# Patient Record
Sex: MALE | Race: OTHER | URBAN - METROPOLITAN AREA
[De-identification: names, ages, dates, MRNs, and addresses within clinical notes are randomized per-mention and may not be internally consistent; named-entity substitution may affect disease eponyms.]

---

## 2019-08-10 ENCOUNTER — INPATIENT (INPATIENT)
Facility: HOSPITAL | Age: 59
LOS: 12 days | Discharge: LEFT BEFORE DISCHARGE | End: 2019-08-23
Attending: INTERNAL MEDICINE | Admitting: INTERNAL MEDICINE
Payer: MEDICAID

## 2019-08-10 VITALS — HEART RATE: 100 BPM | OXYGEN SATURATION: 99 %

## 2019-08-10 LAB
ALBUMIN SERPL ELPH-MCNC: 4.2 G/DL — SIGNIFICANT CHANGE UP (ref 3.5–5.2)
ALP SERPL-CCNC: 91 U/L — SIGNIFICANT CHANGE UP (ref 30–115)
ALT FLD-CCNC: 33 U/L — SIGNIFICANT CHANGE UP (ref 0–41)
ANION GAP SERPL CALC-SCNC: 18 MMOL/L — HIGH (ref 7–14)
AST SERPL-CCNC: 44 U/L — HIGH (ref 0–41)
BILIRUB SERPL-MCNC: 0.8 MG/DL — SIGNIFICANT CHANGE UP (ref 0.2–1.2)
BUN SERPL-MCNC: 21 MG/DL — HIGH (ref 10–20)
CALCIUM SERPL-MCNC: 9.1 MG/DL — SIGNIFICANT CHANGE UP (ref 8.5–10.1)
CHLORIDE SERPL-SCNC: 105 MMOL/L — SIGNIFICANT CHANGE UP (ref 98–110)
CO2 SERPL-SCNC: 21 MMOL/L — SIGNIFICANT CHANGE UP (ref 17–32)
CREAT SERPL-MCNC: 1.2 MG/DL — SIGNIFICANT CHANGE UP (ref 0.7–1.5)
GLUCOSE SERPL-MCNC: 229 MG/DL — HIGH (ref 70–99)
HCT VFR BLD CALC: 47.8 % — SIGNIFICANT CHANGE UP (ref 42–52)
HGB BLD-MCNC: 15 G/DL — SIGNIFICANT CHANGE UP (ref 14–18)
MCHC RBC-ENTMCNC: 28.8 PG — SIGNIFICANT CHANGE UP (ref 27–31)
MCHC RBC-ENTMCNC: 31.4 G/DL — LOW (ref 32–37)
MCV RBC AUTO: 91.9 FL — SIGNIFICANT CHANGE UP (ref 80–94)
NRBC # BLD: 0 /100 WBCS — SIGNIFICANT CHANGE UP (ref 0–0)
NT-PROBNP SERPL-SCNC: 1050 PG/ML — HIGH (ref 0–300)
PLATELET # BLD AUTO: 213 K/UL — SIGNIFICANT CHANGE UP (ref 130–400)
POTASSIUM SERPL-MCNC: 4.7 MMOL/L — SIGNIFICANT CHANGE UP (ref 3.5–5)
POTASSIUM SERPL-SCNC: 4.7 MMOL/L — SIGNIFICANT CHANGE UP (ref 3.5–5)
PROT SERPL-MCNC: 7.5 G/DL — SIGNIFICANT CHANGE UP (ref 6–8)
RBC # BLD: 5.2 M/UL — SIGNIFICANT CHANGE UP (ref 4.7–6.1)
RBC # FLD: 13.2 % — SIGNIFICANT CHANGE UP (ref 11.5–14.5)
SODIUM SERPL-SCNC: 144 MMOL/L — SIGNIFICANT CHANGE UP (ref 135–146)
TROPONIN T SERPL-MCNC: <0.01 NG/ML — SIGNIFICANT CHANGE UP
WBC # BLD: 10.7 K/UL — SIGNIFICANT CHANGE UP (ref 4.8–10.8)
WBC # FLD AUTO: 10.7 K/UL — SIGNIFICANT CHANGE UP (ref 4.8–10.8)

## 2019-08-10 PROCEDURE — 93010 ELECTROCARDIOGRAM REPORT: CPT

## 2019-08-10 PROCEDURE — 99291 CRITICAL CARE FIRST HOUR: CPT

## 2019-08-10 PROCEDURE — 70450 CT HEAD/BRAIN W/O DYE: CPT | Mod: 26

## 2019-08-10 PROCEDURE — 71045 X-RAY EXAM CHEST 1 VIEW: CPT | Mod: 26

## 2019-08-10 RX ORDER — FENTANYL CITRATE 50 UG/ML
0.5 INJECTION INTRAVENOUS
Qty: 2500 | Refills: 0 | Status: DISCONTINUED | OUTPATIENT
Start: 2019-08-10 | End: 2019-08-11

## 2019-08-10 RX ORDER — IPRATROPIUM/ALBUTEROL SULFATE 18-103MCG
3 AEROSOL WITH ADAPTER (GRAM) INHALATION ONCE
Refills: 0 | Status: DISCONTINUED | OUTPATIENT
Start: 2019-08-10 | End: 2019-08-10

## 2019-08-10 RX ORDER — CEFTRIAXONE 500 MG/1
1000 INJECTION, POWDER, FOR SOLUTION INTRAMUSCULAR; INTRAVENOUS ONCE
Refills: 0 | Status: COMPLETED | OUTPATIENT
Start: 2019-08-10 | End: 2019-08-10

## 2019-08-10 RX ORDER — AZITHROMYCIN 500 MG/1
500 TABLET, FILM COATED ORAL ONCE
Refills: 0 | Status: COMPLETED | OUTPATIENT
Start: 2019-08-10 | End: 2019-08-10

## 2019-08-10 RX ORDER — MIDAZOLAM HYDROCHLORIDE 1 MG/ML
0.02 INJECTION, SOLUTION INTRAMUSCULAR; INTRAVENOUS
Qty: 100 | Refills: 0 | Status: DISCONTINUED | OUTPATIENT
Start: 2019-08-10 | End: 2019-08-11

## 2019-08-10 RX ADMIN — Medication 125 MILLIGRAM(S): at 21:12

## 2019-08-10 RX ADMIN — CEFTRIAXONE 100 MILLIGRAM(S): 500 INJECTION, POWDER, FOR SOLUTION INTRAMUSCULAR; INTRAVENOUS at 21:57

## 2019-08-10 RX ADMIN — AZITHROMYCIN 255 MILLIGRAM(S): 500 TABLET, FILM COATED ORAL at 21:57

## 2019-08-10 RX ADMIN — FENTANYL CITRATE 5 MICROGRAM(S)/KG/HR: 50 INJECTION INTRAVENOUS at 20:25

## 2019-08-10 RX ADMIN — MIDAZOLAM HYDROCHLORIDE 2 MG/KG/HR: 1 INJECTION, SOLUTION INTRAMUSCULAR; INTRAVENOUS at 20:25

## 2019-08-10 NOTE — ED PROVIDER NOTE - OBJECTIVE STATEMENT
56y M w/ PMH of COPD and CAD s/p bypass presents after losing consciousness. Per EMS, pt was complaining of SOB before he went unconscious. Was intubated on field for airway protection.

## 2019-08-10 NOTE — ED PROVIDER NOTE - PHYSICAL EXAMINATION
CONSTITUTIONAL: Intubated. Unconscious  SKIN: warm, dry  HEAD: Normocephalic; atraumatic.  EYES: PERRL, EOMI, no conjunctival erythema  ENT: No nasal discharge; airway clear.  NECK: Supple; non tender.  CARD: S1, S2 normal; no murmurs, gallops, or rubs. Regular rate and rhythm. 2+ radial pulses B/L.   RESP: Coarse breath sounds B/l. No wheezes, rales or rhonchi.  ABD: midly distended. ntnd  EXT: Normal ROM.  No clubbing, cyanosis or edema.   LYMPH: No acute cervical adenopathy.  NEURO: Intubated. Unconscious  PSYCH: Cooperative, appropriate.

## 2019-08-10 NOTE — ED PROVIDER NOTE - PROGRESS NOTE DETAILS
ICU paged. Awaiting call back. Discussed with Dr. Felix, ICU fellow. Requests CT Head and CT Angio. Approved for ICU. Pt's daughter (Angelica). [phone]

## 2019-08-10 NOTE — ED PROVIDER NOTE - CRITICAL CARE PROVIDED
direct patient care (not related to procedure)/consultation with other physicians/consult w/ pt's family directly relating to pts condition/interpretation of diagnostic studies/documentation/additional history taking

## 2019-08-10 NOTE — ED PROVIDER NOTE - CLINICAL SUMMARY MEDICAL DECISION MAKING FREE TEXT BOX
57yo M history of COPD otherwise unknown past medical history BIBEMS with respiratory arrest- per EMS, pt was at restaurant, then c/o chest pain, shortness of breath, felt weak, went down. Upon EMS arrival, pt in respiratory arrest, intubated with etomidate and valium. Hx otherwise limited 2/2 severe illness and intubation. labs imaging reviewed.  icu, approved, will admit

## 2019-08-10 NOTE — ED PROVIDER NOTE - ATTENDING CONTRIBUTION TO CARE
55yo M history of COPD otherwise unknown past medical history BIBEMS with respiratory arrest- per EMS, pt was at restaurant, then c/o chest pain, shortness of breath, felt weak, went down. Upon EMS arrival, pt in respiratory arrest, intubated with etomidate and valium. Hx otherwise limited 2/2 severe illness and intubation  Constitutional: sedated  Eyes: PERRLA. Extraocular movements intact, no entrapment. Conjunctiva normal.   ENT: No nasal discharge. Moist mucus membranes. ETT in place, confirmed on VL  Neck: Supple  CV: RRR no murmurs, rubs, or gallops. +S1S2.   Pulm: Clear to auscultation bilaterally.    Abd: soft NT ND +BS.   Ext: Warm and well perfused x4, moving all extremities, no edema.   Psy: Cooperative, appropriate.   Skin: Warm, dry, no rash  resp arrest- labs imaging reassess

## 2019-08-11 DIAGNOSIS — Z95.1 PRESENCE OF AORTOCORONARY BYPASS GRAFT: Chronic | ICD-10-CM

## 2019-08-11 LAB
ABO RH CONFIRMATION: SIGNIFICANT CHANGE UP
ALBUMIN SERPL ELPH-MCNC: 3.9 G/DL — SIGNIFICANT CHANGE UP (ref 3.5–5.2)
ALBUMIN SERPL ELPH-MCNC: 3.9 G/DL — SIGNIFICANT CHANGE UP (ref 3.5–5.2)
ALP SERPL-CCNC: 72 U/L — SIGNIFICANT CHANGE UP (ref 30–115)
ALP SERPL-CCNC: 73 U/L — SIGNIFICANT CHANGE UP (ref 30–115)
ALT FLD-CCNC: 30 U/L — SIGNIFICANT CHANGE UP (ref 0–41)
ALT FLD-CCNC: 30 U/L — SIGNIFICANT CHANGE UP (ref 0–41)
ANION GAP SERPL CALC-SCNC: 12 MMOL/L — SIGNIFICANT CHANGE UP (ref 7–14)
ANION GAP SERPL CALC-SCNC: 12 MMOL/L — SIGNIFICANT CHANGE UP (ref 7–14)
APPEARANCE UR: CLEAR — SIGNIFICANT CHANGE UP
APTT BLD: 29.5 SEC — SIGNIFICANT CHANGE UP (ref 27–39.2)
AST SERPL-CCNC: 32 U/L — SIGNIFICANT CHANGE UP (ref 0–41)
AST SERPL-CCNC: 32 U/L — SIGNIFICANT CHANGE UP (ref 0–41)
BACTERIA # UR AUTO: NEGATIVE — SIGNIFICANT CHANGE UP
BASE EXCESS BLDA CALC-SCNC: 0.3 MMOL/L — SIGNIFICANT CHANGE UP (ref -2–2)
BASOPHILS # BLD AUTO: 0 K/UL — SIGNIFICANT CHANGE UP (ref 0–0.2)
BASOPHILS NFR BLD AUTO: 0 % — SIGNIFICANT CHANGE UP (ref 0–1)
BILIRUB SERPL-MCNC: 0.6 MG/DL — SIGNIFICANT CHANGE UP (ref 0.2–1.2)
BILIRUB SERPL-MCNC: 0.7 MG/DL — SIGNIFICANT CHANGE UP (ref 0.2–1.2)
BILIRUB UR-MCNC: NEGATIVE — SIGNIFICANT CHANGE UP
BLD GP AB SCN SERPL QL: SIGNIFICANT CHANGE UP
BUN SERPL-MCNC: 23 MG/DL — HIGH (ref 10–20)
BUN SERPL-MCNC: 23 MG/DL — HIGH (ref 10–20)
CALCIUM SERPL-MCNC: 8.7 MG/DL — SIGNIFICANT CHANGE UP (ref 8.5–10.1)
CALCIUM SERPL-MCNC: 9.1 MG/DL — SIGNIFICANT CHANGE UP (ref 8.5–10.1)
CHLORIDE SERPL-SCNC: 106 MMOL/L — SIGNIFICANT CHANGE UP (ref 98–110)
CHLORIDE SERPL-SCNC: 106 MMOL/L — SIGNIFICANT CHANGE UP (ref 98–110)
CO2 SERPL-SCNC: 23 MMOL/L — SIGNIFICANT CHANGE UP (ref 17–32)
CO2 SERPL-SCNC: 23 MMOL/L — SIGNIFICANT CHANGE UP (ref 17–32)
COLOR SPEC: YELLOW — SIGNIFICANT CHANGE UP
CREAT SERPL-MCNC: 1 MG/DL — SIGNIFICANT CHANGE UP (ref 0.7–1.5)
CREAT SERPL-MCNC: 1 MG/DL — SIGNIFICANT CHANGE UP (ref 0.7–1.5)
DIFF PNL FLD: ABNORMAL
EOSINOPHIL # BLD AUTO: 0 K/UL — SIGNIFICANT CHANGE UP (ref 0–0.7)
EOSINOPHIL NFR BLD AUTO: 0 % — SIGNIFICANT CHANGE UP (ref 0–8)
EPI CELLS # UR: 4 /HPF — SIGNIFICANT CHANGE UP (ref 0–5)
GAS PNL BLDA: SIGNIFICANT CHANGE UP
GLUCOSE BLDC GLUCOMTR-MCNC: 155 MG/DL — HIGH (ref 70–99)
GLUCOSE BLDC GLUCOMTR-MCNC: 159 MG/DL — HIGH (ref 70–99)
GLUCOSE BLDC GLUCOMTR-MCNC: 186 MG/DL — HIGH (ref 70–99)
GLUCOSE BLDC GLUCOMTR-MCNC: 197 MG/DL — HIGH (ref 70–99)
GLUCOSE SERPL-MCNC: 165 MG/DL — HIGH (ref 70–99)
GLUCOSE SERPL-MCNC: 201 MG/DL — HIGH (ref 70–99)
GLUCOSE UR QL: SIGNIFICANT CHANGE UP
HCO3 BLDA-SCNC: 27 MMOL/L — SIGNIFICANT CHANGE UP (ref 23–27)
HCT VFR BLD CALC: 40.6 % — LOW (ref 42–52)
HCT VFR BLD CALC: 40.8 % — LOW (ref 42–52)
HGB BLD-MCNC: 13.4 G/DL — LOW (ref 14–18)
HGB BLD-MCNC: 13.4 G/DL — LOW (ref 14–18)
HYALINE CASTS # UR AUTO: 8 /LPF — HIGH (ref 0–7)
IMM GRANULOCYTES NFR BLD AUTO: 0.3 % — SIGNIFICANT CHANGE UP (ref 0.1–0.3)
INR BLD: 1.17 RATIO — SIGNIFICANT CHANGE UP (ref 0.65–1.3)
KETONES UR-MCNC: NEGATIVE — SIGNIFICANT CHANGE UP
LACTATE SERPL-SCNC: 1.2 MMOL/L — SIGNIFICANT CHANGE UP (ref 0.5–2.2)
LEUKOCYTE ESTERASE UR-ACNC: NEGATIVE — SIGNIFICANT CHANGE UP
LYMPHOCYTES # BLD AUTO: 0.35 K/UL — LOW (ref 1.2–3.4)
LYMPHOCYTES # BLD AUTO: 5.2 % — LOW (ref 20.5–51.1)
MAGNESIUM SERPL-MCNC: 2 MG/DL — SIGNIFICANT CHANGE UP (ref 1.8–2.4)
MCHC RBC-ENTMCNC: 29.1 PG — SIGNIFICANT CHANGE UP (ref 27–31)
MCHC RBC-ENTMCNC: 29.3 PG — SIGNIFICANT CHANGE UP (ref 27–31)
MCHC RBC-ENTMCNC: 32.8 G/DL — SIGNIFICANT CHANGE UP (ref 32–37)
MCHC RBC-ENTMCNC: 33 G/DL — SIGNIFICANT CHANGE UP (ref 32–37)
MCV RBC AUTO: 88.7 FL — SIGNIFICANT CHANGE UP (ref 80–94)
MCV RBC AUTO: 88.8 FL — SIGNIFICANT CHANGE UP (ref 80–94)
MONOCYTES # BLD AUTO: 0.14 K/UL — SIGNIFICANT CHANGE UP (ref 0.1–0.6)
MONOCYTES NFR BLD AUTO: 2.1 % — SIGNIFICANT CHANGE UP (ref 1.7–9.3)
NEUTROPHILS # BLD AUTO: 6.26 K/UL — SIGNIFICANT CHANGE UP (ref 1.4–6.5)
NEUTROPHILS NFR BLD AUTO: 92.4 % — HIGH (ref 42.2–75.2)
NITRITE UR-MCNC: NEGATIVE — SIGNIFICANT CHANGE UP
NRBC # BLD: 0 /100 WBCS — SIGNIFICANT CHANGE UP (ref 0–0)
NRBC # BLD: 0 /100 WBCS — SIGNIFICANT CHANGE UP (ref 0–0)
PCO2 BLDA: 48 MMHG — HIGH (ref 38–42)
PH BLDA: 7.35 — LOW (ref 7.38–7.42)
PH UR: 6.5 — SIGNIFICANT CHANGE UP (ref 5–8)
PHOSPHATE SERPL-MCNC: 1.2 MG/DL — LOW (ref 2.1–4.9)
PLATELET # BLD AUTO: 157 K/UL — SIGNIFICANT CHANGE UP (ref 130–400)
PLATELET # BLD AUTO: 161 K/UL — SIGNIFICANT CHANGE UP (ref 130–400)
PO2 BLDA: 90 MMHG — SIGNIFICANT CHANGE UP (ref 78–95)
POTASSIUM SERPL-MCNC: 4 MMOL/L — SIGNIFICANT CHANGE UP (ref 3.5–5)
POTASSIUM SERPL-MCNC: 4 MMOL/L — SIGNIFICANT CHANGE UP (ref 3.5–5)
POTASSIUM SERPL-SCNC: 4 MMOL/L — SIGNIFICANT CHANGE UP (ref 3.5–5)
POTASSIUM SERPL-SCNC: 4 MMOL/L — SIGNIFICANT CHANGE UP (ref 3.5–5)
PROT SERPL-MCNC: 6.4 G/DL — SIGNIFICANT CHANGE UP (ref 6–8)
PROT SERPL-MCNC: 6.5 G/DL — SIGNIFICANT CHANGE UP (ref 6–8)
PROT UR-MCNC: ABNORMAL
PROTHROM AB SERPL-ACNC: 13.4 SEC — HIGH (ref 9.95–12.87)
RBC # BLD: 4.57 M/UL — LOW (ref 4.7–6.1)
RBC # BLD: 4.6 M/UL — LOW (ref 4.7–6.1)
RBC # FLD: 13.2 % — SIGNIFICANT CHANGE UP (ref 11.5–14.5)
RBC # FLD: 13.3 % — SIGNIFICANT CHANGE UP (ref 11.5–14.5)
RBC CASTS # UR COMP ASSIST: 21 /HPF — HIGH (ref 0–4)
SAO2 % BLDA: 97 % — SIGNIFICANT CHANGE UP (ref 94–98)
SODIUM SERPL-SCNC: 141 MMOL/L — SIGNIFICANT CHANGE UP (ref 135–146)
SODIUM SERPL-SCNC: 141 MMOL/L — SIGNIFICANT CHANGE UP (ref 135–146)
SP GR SPEC: >1.05 (ref 1.01–1.02)
TROPONIN T SERPL-MCNC: 0.13 NG/ML — CRITICAL HIGH
TROPONIN T SERPL-MCNC: 0.16 NG/ML — CRITICAL HIGH
UROBILINOGEN FLD QL: ABNORMAL
WBC # BLD: 6.77 K/UL — SIGNIFICANT CHANGE UP (ref 4.8–10.8)
WBC # BLD: 8.07 K/UL — SIGNIFICANT CHANGE UP (ref 4.8–10.8)
WBC # FLD AUTO: 6.77 K/UL — SIGNIFICANT CHANGE UP (ref 4.8–10.8)
WBC # FLD AUTO: 8.07 K/UL — SIGNIFICANT CHANGE UP (ref 4.8–10.8)
WBC UR QL: 4 /HPF — SIGNIFICANT CHANGE UP (ref 0–5)

## 2019-08-11 PROCEDURE — 95819 EEG AWAKE AND ASLEEP: CPT | Mod: 26

## 2019-08-11 PROCEDURE — 71045 X-RAY EXAM CHEST 1 VIEW: CPT | Mod: 26

## 2019-08-11 PROCEDURE — 71275 CT ANGIOGRAPHY CHEST: CPT | Mod: 26

## 2019-08-11 PROCEDURE — 99222 1ST HOSP IP/OBS MODERATE 55: CPT

## 2019-08-11 PROCEDURE — 93306 TTE W/DOPPLER COMPLETE: CPT | Mod: 26

## 2019-08-11 RX ORDER — ASPIRIN/CALCIUM CARB/MAGNESIUM 324 MG
325 TABLET ORAL ONCE
Refills: 0 | Status: COMPLETED | OUTPATIENT
Start: 2019-08-11 | End: 2019-08-11

## 2019-08-11 RX ORDER — CHLORHEXIDINE GLUCONATE 213 G/1000ML
1 SOLUTION TOPICAL DAILY
Refills: 0 | Status: DISCONTINUED | OUTPATIENT
Start: 2019-08-11 | End: 2019-08-23

## 2019-08-11 RX ORDER — ATORVASTATIN CALCIUM 80 MG/1
80 TABLET, FILM COATED ORAL AT BEDTIME
Refills: 0 | Status: DISCONTINUED | OUTPATIENT
Start: 2019-08-11 | End: 2019-08-23

## 2019-08-11 RX ORDER — ASPIRIN/CALCIUM CARB/MAGNESIUM 324 MG
1 TABLET ORAL
Qty: 0 | Refills: 0 | DISCHARGE

## 2019-08-11 RX ORDER — ATORVASTATIN CALCIUM 80 MG/1
1 TABLET, FILM COATED ORAL
Qty: 0 | Refills: 0 | DISCHARGE

## 2019-08-11 RX ORDER — IPRATROPIUM/ALBUTEROL SULFATE 18-103MCG
1 AEROSOL WITH ADAPTER (GRAM) INHALATION EVERY 4 HOURS
Refills: 0 | Status: DISCONTINUED | OUTPATIENT
Start: 2019-08-11 | End: 2019-08-23

## 2019-08-11 RX ORDER — PANTOPRAZOLE SODIUM 20 MG/1
40 TABLET, DELAYED RELEASE ORAL DAILY
Refills: 0 | Status: DISCONTINUED | OUTPATIENT
Start: 2019-08-11 | End: 2019-08-23

## 2019-08-11 RX ORDER — FENTANYL CITRATE 50 UG/ML
0.5 INJECTION INTRAVENOUS
Qty: 2500 | Refills: 0 | Status: DISCONTINUED | OUTPATIENT
Start: 2019-08-11 | End: 2019-08-17

## 2019-08-11 RX ORDER — IPRATROPIUM/ALBUTEROL SULFATE 18-103MCG
3 AEROSOL WITH ADAPTER (GRAM) INHALATION ONCE
Refills: 0 | Status: COMPLETED | OUTPATIENT
Start: 2019-08-11 | End: 2019-08-11

## 2019-08-11 RX ORDER — FUROSEMIDE 40 MG
20 TABLET ORAL ONCE
Refills: 0 | Status: COMPLETED | OUTPATIENT
Start: 2019-08-11 | End: 2019-08-11

## 2019-08-11 RX ORDER — HEPARIN SODIUM 5000 [USP'U]/ML
5000 INJECTION INTRAVENOUS; SUBCUTANEOUS EVERY 8 HOURS
Refills: 0 | Status: DISCONTINUED | OUTPATIENT
Start: 2019-08-11 | End: 2019-08-23

## 2019-08-11 RX ORDER — AMPICILLIN SODIUM AND SULBACTAM SODIUM 250; 125 MG/ML; MG/ML
1.5 INJECTION, POWDER, FOR SUSPENSION INTRAMUSCULAR; INTRAVENOUS EVERY 6 HOURS
Refills: 0 | Status: DISCONTINUED | OUTPATIENT
Start: 2019-08-11 | End: 2019-08-17

## 2019-08-11 RX ORDER — CHLORHEXIDINE GLUCONATE 213 G/1000ML
15 SOLUTION TOPICAL
Refills: 0 | Status: DISCONTINUED | OUTPATIENT
Start: 2019-08-11 | End: 2019-08-17

## 2019-08-11 RX ORDER — ASPIRIN/CALCIUM CARB/MAGNESIUM 324 MG
81 TABLET ORAL DAILY
Refills: 0 | Status: DISCONTINUED | OUTPATIENT
Start: 2019-08-12 | End: 2019-08-23

## 2019-08-11 RX ORDER — LISINOPRIL 2.5 MG/1
1 TABLET ORAL
Qty: 0 | Refills: 0 | DISCHARGE

## 2019-08-11 RX ORDER — PROPOFOL 10 MG/ML
10 INJECTION, EMULSION INTRAVENOUS
Qty: 1000 | Refills: 0 | Status: DISCONTINUED | OUTPATIENT
Start: 2019-08-11 | End: 2019-08-17

## 2019-08-11 RX ADMIN — Medication 101.92 MILLIGRAM(S): at 13:14

## 2019-08-11 RX ADMIN — FENTANYL CITRATE 4.67 MICROGRAM(S)/KG/HR: 50 INJECTION INTRAVENOUS at 13:49

## 2019-08-11 RX ADMIN — AMPICILLIN SODIUM AND SULBACTAM SODIUM 100 GRAM(S): 250; 125 INJECTION, POWDER, FOR SUSPENSION INTRAMUSCULAR; INTRAVENOUS at 03:45

## 2019-08-11 RX ADMIN — PANTOPRAZOLE SODIUM 40 MILLIGRAM(S): 20 TABLET, DELAYED RELEASE ORAL at 12:38

## 2019-08-11 RX ADMIN — Medication 85 MILLIMOLE(S): at 15:15

## 2019-08-11 RX ADMIN — HEPARIN SODIUM 5000 UNIT(S): 5000 INJECTION INTRAVENOUS; SUBCUTANEOUS at 13:13

## 2019-08-11 RX ADMIN — HEPARIN SODIUM 5000 UNIT(S): 5000 INJECTION INTRAVENOUS; SUBCUTANEOUS at 05:54

## 2019-08-11 RX ADMIN — CHLORHEXIDINE GLUCONATE 15 MILLILITER(S): 213 SOLUTION TOPICAL at 17:31

## 2019-08-11 RX ADMIN — HEPARIN SODIUM 5000 UNIT(S): 5000 INJECTION INTRAVENOUS; SUBCUTANEOUS at 21:41

## 2019-08-11 RX ADMIN — CHLORHEXIDINE GLUCONATE 15 MILLILITER(S): 213 SOLUTION TOPICAL at 07:13

## 2019-08-11 RX ADMIN — Medication 101.92 MILLIGRAM(S): at 03:44

## 2019-08-11 RX ADMIN — PROPOFOL 6.3 MICROGRAM(S)/KG/MIN: 10 INJECTION, EMULSION INTRAVENOUS at 20:01

## 2019-08-11 RX ADMIN — CHLORHEXIDINE GLUCONATE 1 APPLICATION(S): 213 SOLUTION TOPICAL at 12:36

## 2019-08-11 RX ADMIN — Medication 325 MILLIGRAM(S): at 12:38

## 2019-08-11 RX ADMIN — ATORVASTATIN CALCIUM 80 MILLIGRAM(S): 80 TABLET, FILM COATED ORAL at 21:41

## 2019-08-11 RX ADMIN — AMPICILLIN SODIUM AND SULBACTAM SODIUM 100 GRAM(S): 250; 125 INJECTION, POWDER, FOR SUSPENSION INTRAMUSCULAR; INTRAVENOUS at 12:41

## 2019-08-11 RX ADMIN — PROPOFOL 6.3 MICROGRAM(S)/KG/MIN: 10 INJECTION, EMULSION INTRAVENOUS at 02:27

## 2019-08-11 RX ADMIN — AMPICILLIN SODIUM AND SULBACTAM SODIUM 100 GRAM(S): 250; 125 INJECTION, POWDER, FOR SUSPENSION INTRAMUSCULAR; INTRAVENOUS at 17:59

## 2019-08-11 RX ADMIN — Medication 20 MILLIGRAM(S): at 03:43

## 2019-08-11 NOTE — H&P ADULT - NSHPLABSRESULTS_GEN_ALL_CORE
LABS:                        15.0   10.70 )-----------( 213      ( 10 Aug 2019 20:26 )             47.8     08-10    144  |  105  |  21<H>  ----------------------------<  229<H>  4.7   |  21  |  1.2    Ca    9.1      10 Aug 2019 20:26    TPro  7.5  /  Alb  4.2  /  TBili  0.8  /  DBili  x   /  AST  44<H>  /  ALT  33  /  AlkPhos  91  08-10        ABG - ( 10 Aug 2019 21:23 )  pH, Arterial: 7.24  pH, Blood: x     /  pCO2: 65    /  pO2: 102   / HCO3: 28    / Base Excess: -1.4  /  SaO2: 97        Lactate Trend    CARDIAC MARKERS ( 10 Aug 2019 20:26 )  x     / <0.01 ng/mL / x     / x     / x        CAPILLARY BLOOD GLUCOSE    Serum Pro-Brain Natriuretic Peptide (08.10.19 @ 20:26)    Serum Pro-Brain Natriuretic Peptide: 1050 pg/mL        RADIOLOGY:    < from: CT Angio Chest w/ IV Cont (08.11.19 @ 00:00) >  IMPRESSION:   No CTA evidence of acute pulmonary embolus.  Reflux of intravenous contrast into the hepatic veins. This can be seen   with right heart dysfunction.  Atelectasis of the right lower lobe. Debris within right mainstem   bronchus.  < end of copied text >    < from: CT Head No Cont (08.10.19 @ 23:59) >  IMPRESSION:  No CT evidence of acute intracranial pathology.  < end of copied text >      EKGS:

## 2019-08-11 NOTE — DIETITIAN INITIAL EVALUATION ADULT. - PERTINENT LABORATORY DATA
(8/11) Na-141, K-4, CL-106, BUN-23, Cr-1, Glucose-165mg/dL, POC-155, 159, 197mg/dL, Ca-9.1, H/H-13.4/40.6

## 2019-08-11 NOTE — ED ADULT NURSE NOTE - OBJECTIVE STATEMENT
pt BIBA. was at a restaurant, became diaphoretic, had difficulty breathing, became unresponsive. EMS intubated pt in field.

## 2019-08-11 NOTE — DIETITIAN INITIAL EVALUATION ADULT. - OTHER INFO
55y/o male with pmhx noted above presented to the ED with chest pain and found unresponsive. Found to have acute respiratory failure. The patient was intubated for airway protection and brought to the CCU. Currently sedated with fentanyl and propofol. CXR appears to show mild pulmonary edema. CTH non remarkable and CT neck and chest show no acute pathology. Hospital course is complicated by mild troponin elevation/ ECG changes,  d/t demand ischemia and RLL pneumonia. MAP-63. Skin is intact (Rosalio Score-15).       Subjective Data: I tried to contact the patient's daughter name Batsheva [phone] but could not get through.

## 2019-08-11 NOTE — DIETITIAN INITIAL EVALUATION ADULT. - ENTERAL
1.Once medically feasible, recommend restart TF with Vital HP at 20mL, increase 15mL Q4hrs to goal rate of 35mL/hr (840kcal, 73gm pro, 706mL free H2O) + 591kcal from Propofol

## 2019-08-11 NOTE — CONSULT NOTE ADULT - ASSESSMENT
58 yo M w/ PMH of COPD (smoke 4 packs/day for over 30 years), CAD s/p CABG/PCI , DM p/w ARF s/p intubation.     Impression:  Mild troponin elevation/ ECG changes: probably due to demand ischemia   ARF s/p intubation  ? PNA  possible CHF  h/o CAD s/p CABG/ PCI, COPD    Plan:  obtain ECHO  repeat ECG  cont medical therapy with DAPT, statin  restart BB and ACEi when BP permits  Monitor Is & Os and use Lasix as needed   medical management of PNA/ COPD and vent management per pulm/CC team  will follow 58 yo M w/ PMH of COPD (smoke 4 packs/day for over 30 years), CAD s/p CABG/PCI , DM p/w ARF s/p intubation.     Impression:  Mild troponin elevation/ ECG changes: probably due to demand ischemia   ARF s/p intubation  ? PNA  possible CHF  h/o CAD s/p CABG/ PCI, COPD    Plan:  obtain ECHO  repeat ECG, serial troponins  cont medical therapy with DAPT, statin  restart BB and ACEi when BP permits  Monitor Is & Os and use Lasix for diuresis  medical management of PNA/ COPD and vent management per pulm/CC team  will follow

## 2019-08-11 NOTE — DIETITIAN INITIAL EVALUATION ADULT. - PERTINENT MEDS FT
Heparin, Unasyn, Peridex, Combivent Respimat, Fentanyl, Solumedrol, Protonix, Sodium Phosphate, Propofol at 22.4mL/hr (591kcal/day)

## 2019-08-11 NOTE — H&P ADULT - ASSESSMENT
A 56? yo M (named Mr. Dimitry Frazier) w/ PMH of COPD (smoke 4 packs/day for over 30 years), CAD s/p CABG, DM and hx of illicit drug use presents to the ED unresponsive.     IMPRESSION:  Hypercapnic/hypoxic respiratory failure   Mild pulmonary edema   COPD   CAD s/p CABG     PLAN:    CNS:  - sedation with propofol   - order EEG     HEENT:  - keep HOB 30 to 45 degrees     PULMONARY:  - solumedrol 60 mg q8h  - albuterol/ipratropium q4h   - increase PEEP to 7.5     CARDIOVASCULAR:  - keep I = O   - lasix 20 mg IV x 1   - trend CE     GI:   - GI prophylaxis  - start Feeding as tolerated     RENAL:  - f/u lytes     INFECTIOUS DISEASE:  - f/u pancultures   - DTA culture  - start unasyn     HEMATOLOGICAL:    - DVT prophylaxis.    ENDOCRINE:    - Follow up FS.  Insulin protocol if needed.    MUSCULOSKELETAL:  - strict bedrest     ( X ) Discussion with patient and/or family regarding goals of care

## 2019-08-11 NOTE — CONSULT NOTE ADULT - ASSESSMENT
IMPRESSION:    Acute respiratory failure  RLL PNA  ?pHTN       PLAN:    CNS: Spontaneous awakening trial    HEENT: Oral care    PULMONARY:  HOB @ 45 degrees.  Vent changes as follows: Change PEEP to 10 lower FiO2 as tolerated maintain sat > 92% Keep I < O Solumedrol 60 daily.    CARDIOVASCULAR: Cardiology. Check 2D echo. Give aspirin.     GI: GI prophylaxis.  Feeding     RENAL:  Follow up lytes.  Correct as needed.    INFECTIOUS DISEASE: Follow up cultures. Continue Unasyn.    HEMATOLOGICAL:  DVT prophylaxis.    ENDOCRINE:  Follow up FS.    MUSCULOSKELETAL: Bedrest. IMPRESSION:    Acute respiratory failure  RLL PNA  RO aspiration       PLAN:    CNS: Keep sedated today     HEENT: Oral care    PULMONARY:  HOB @ 45 degrees.  Vent changes as follows: Change PEEP to 10 lower FiO2 as tolerated maintain sat > 92% Keep I < O Solumedrol 60 daily.    CARDIOVASCULAR: Cardiology. Check 2D echo. Give aspirin.     GI: GI prophylaxis.  OG Feeding     RENAL:  Follow up lytes.  Correct as needed.    INFECTIOUS DISEASE: Follow up cultures. Continue Unasyn.    HEMATOLOGICAL:  DVT prophylaxis.    ENDOCRINE:  Follow up FS.    MUSCULOSKELETAL: Bedrest.

## 2019-08-11 NOTE — DIETITIAN INITIAL EVALUATION ADULT. - RD TO REMAIN AVAILABLE
Intervention:1.Enteral Nutrition  Monitor/Evaluate:Diet order, energy intake, nutrition focused physical findings, glucose profile/yes

## 2019-08-11 NOTE — DIETITIAN INITIAL EVALUATION ADULT. - ENERGY NEEDS
Estimated Calorie Needs: 1400kcal/day obtained by comparing PSE 2010 (MSJ-1700, MV-9, TMax-36.7)=1823 x 60%-70%=1094-1276kcal/day vs 1027-1308kcal/day (11-14kcal/kg of admit weight) vs 1430-1625kcal/day (22-25kcal/kg of IBW-65kg)  Estimated Protein Needs: 78-130grams/day (1.2-2grams/kg of IBW-65kg) -Due to intubation  Estimated Fluid Needs: Fluids per CCU team

## 2019-08-11 NOTE — CONSULT NOTE ADULT - SUBJECTIVE AND OBJECTIVE BOX
Patient is a 59y old  Male who presents with a chief complaint of Chest pain (11 Aug 2019 08:58)    HPI:  Pt was intubated on admission, hx was taken from Mr. Carl Phipps (brother) over the phone.     A 56? yo M (named Mr. Dimitry Frazier) w/ PMH of COPD (smoke 4 packs/day for over 30 years), CAD s/p CABG, DM and hx of illicit drug use presents to the ED unresponsive. 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 Shortly after, pt asked for ambulance and was intubated on site. Pt was endorsing lethargy through out the day. As per brother, pt denies fever, chill, sick contacts, diarrhea, constipation. Denies alcohol use.     In the ED, pt was started on low dose of versed. Given azithromycin and ceftriaxone, multiple doses of duoneb and solumedrol 125 mg x 1. CXR appears to show mild pulmonary edema. CTH NC non remarkable and CT neck and chest show no acute pathology. ICU consulted for intubated patient. (11 Aug 2019 01:20)      ROS:  unable to obtain. pt is intubated.     PAST MEDICAL & SURGICAL HISTORY  COPD (chronic obstructive pulmonary disease)  DM (diabetes mellitus)  CAD (coronary artery disease) of bypass graft  S/P CABG (coronary artery bypass graft)      FAMILY HISTORY:  FAMILY HISTORY:  unable to obtain.    SOCIAL HISTORY:  unable to obtain   []smoker  []Alcohol  []Drug    ALLERGIES:  No Known Allergies      MEDICATIONS:  MEDICATIONS  (STANDING):  ALBUTerol/ipratropium (CFC free) Inhaler. 1 Puff(s) Inhalation every 4 hours  ampicillin/sulbactam  IVPB 1.5 Gram(s) IV Intermittent every 6 hours  atorvastatin 80 milliGRAM(s) Oral at bedtime  chlorhexidine 0.12% Liquid 15 milliLiter(s) Oral Mucosa two times a day  chlorhexidine 4% Liquid 1 Application(s) Topical daily  fentaNYL   Infusion. 0.5 MICROgram(s)/kG/Hr (4.67 mL/Hr) IV Continuous <Continuous>  heparin  Injectable 5000 Unit(s) SubCutaneous every 8 hours  methylPREDNISolone sodium succinate IVPB 60 milliGRAM(s) IV Intermittent daily  pantoprazole   Suspension 40 milliGRAM(s) Enteral Tube daily  propofol Infusion 10 MICROgram(s)/kG/Min (6.3 mL/Hr) IV Continuous <Continuous>    MEDICATIONS  (PRN):  ALBUTerol/ipratropium (CFC free) Inhaler. 1 Puff(s) Inhalation every 4 hours PRN Wheezing      HOME MEDICATIONS:  Home Medications:  atorvastatin 80 mg oral tablet: 1 tab(s) orally once a day (11 Aug 2019 15:55)  carvedilol 25 mg oral tablet: 1 tab(s) orally 2 times a day (11 Aug 2019 15:54)  furosemide 40 mg oral tablet: 1 tab(s) orally once a day (11 Aug 2019 15:53)  isosorbide mononitrate 120 mg oral tablet, extended release: 1 tab(s) orally once a day (in the morning) (11 Aug 2019 15:54)  lisinopril 40 mg oral tablet: 1 tab(s) orally once a day (11 Aug 2019 15:54)  Low Dose ASA 81 mg oral tablet: 1 tab(s) orally once a day (11 Aug 2019 15:54)      VITALS:   T(F): 98.2 (08-11 @ 12:00), Max: 98.7 (08-11 @ 08:00)  HR: 62 (08-11 @ 16:00) (54 - 124)  BP: 82/50 (08-11 @ 16:00) (65/55 - 157/89)  BP(mean): 63 (08-11 @ 16:00) (59 - 130)  RR: 22 (08-11 @ 16:00) (16 - 23)  SpO2: 100% (08-11 @ 16:00) (97% - 100%)    I&O's Summary    10 Aug 2019 07:01  -  11 Aug 2019 07:00  --------------------------------------------------------  IN: 110.9 mL / OUT: 1225 mL / NET: -1114.1 mL    11 Aug 2019 07:01  -  11 Aug 2019 16:35  --------------------------------------------------------  IN: 813.6 mL / OUT: 410 mL / NET: 403.6 mL        PHYSICAL EXAM:  GEN: intubated   HEENT: no pallor  NECK: Supple, no JVD  LUNGS: +b/l air entry   CARDIOVASCULAR: S1/S2 regular, no murmurs or rubs  ABD: Soft, BS+  EXT: mild LE edema, no cyanosis   NEURO: intubated, sedated     LABS:                        13.4   6.77  )-----------( 161      ( 11 Aug 2019 04:29 )             40.6     08-11    141  |  106  |  23<H>  ----------------------------<  165<H>  4.0   |  23  |  1.0    Ca    9.1      11 Aug 2019 04:29  Phos  1.2     08-11  Mg     2.0     08-11    TPro  6.5  /  Alb  3.9  /  TBili  0.7  /  DBili  x   /  AST  32  /  ALT  30  /  AlkPhos  73  08-11    PT/INR - ( 11 Aug 2019 01:59 )   PT: 13.40 sec;   INR: 1.17 ratio         PTT - ( 11 Aug 2019 01:59 )  PTT:29.5 sec  Troponin T, Serum: 0.13 ng/mL <HH> (08-11-19 @ 04:29)  Lactate, Blood: 1.2 mmol/L (08-11-19 @ 04:29)  Troponin T, Serum: 0.16 ng/mL <HH> (08-11-19 @ 01:52)  Troponin T, Serum: <0.01 ng/mL (08-10-19 @ 20:26)    CARDIAC MARKERS ( 11 Aug 2019 04:29 )  x     / 0.13 ng/mL / x     / x     / x      CARDIAC MARKERS ( 11 Aug 2019 01:52 )  x     / 0.16 ng/mL / x     / x     / x      CARDIAC MARKERS ( 10 Aug 2019 20:26 )  x     / <0.01 ng/mL / x     / x     / x            Troponin trend:    Serum Pro-Brain Natriuretic Peptide: 1050 pg/mL (08-10-19 @ 20:26)      Hemoglobin A1C   Thyroid      RADIOLOGY:  -CXR:  < from: Xray Chest 1 View- PORTABLE-Urgent (08.11.19 @ 05:13) >    Impression:      Pulmonary vascular congestion, decreasing on the most recent radiograph   8/11/2019 at 4:41 AM. Right basilar opacity. Trace left effusion. No   pneumothorax.    Support lines and tubes as described.    < end of copied text >    -CT:  < from: CT Angio Chest w/ IV Cont (08.11.19 @ 00:00) >  IMPRESSION:     No CTA evidence of acute pulmonary embolus.    Reflux of intravenous contrast into the hepatic veins. This can be seen   with right heart dysfunction.    Atelectasis of the right lower lobe. Debris within right mainstem   bronchus.    < end of copied text >  < from: CT Head No Cont (08.10.19 @ 23:59) >  IMPRESSION:    No CT evidence of acute intracranial pathology.    < end of copied text >        ECG:  < from: 12 Lead ECG (08.10.19 @ 20:54) >  Diagnosis Line Normal sinus rhythmwith sinus arrhythmia  Possible Inferior infarct , age undetermined  Cannot rule out Anterior infarct , age undetermined  ST & T wave abnormality, consider lateral ischemia  Abnormal ECG    < end of copied text >    TELEMETRY EVENTS:  few PVCs Patient is a 59y old  Male who presents with a chief complaint of Chest pain (11 Aug 2019 08:58)    HPI:  Initial HPI: Pt was intubated on admission, hx was taken from . Carl Phipps (brother) over the phone.     A 56? yo M (named Mr. Dimitry Frazier) w/ PMH of COPD (smoke 4 packs/day for over 30 years), CAD s/p CABG, DM and hx of illicit drug use presents to the ED unresponsive. 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 Shortly after, pt asked for ambulance and was intubated on site. Pt was endorsing lethargy through out the day. As per brother, pt denies fever, chill, sick contacts, diarrhea, constipation. Denies alcohol use.     In the ED, pt was started on low dose of versed. Given azithromycin and ceftriaxone, multiple doses of duoneb and solumedrol 125 mg x 1. CXR appears to show mild pulmonary edema. CTH NC non remarkable and CT neck and chest show no acute pathology. ICU consulted for intubated patient. (11 Aug 2019 01:20)    cardiology fellow addendum: pt is still intubated and currently in CCU. Pt is on ABX for PNA. cardiology was consulted foe evaluation of elevated troponin and ECG changes. As per pt's brother at bedside, pt follows up with a cardiologist in [place].   ROS:  unable to obtain. pt is intubated.     PAST MEDICAL & SURGICAL HISTORY  COPD (chronic obstructive pulmonary disease)  DM (diabetes mellitus)  CAD (coronary artery disease) of bypass graft  S/P CABG (coronary artery bypass graft)      FAMILY HISTORY:  FAMILY HISTORY:  unable to obtain.    SOCIAL HISTORY:  unable to obtain   []smoker  []Alcohol  []Drug    ALLERGIES:  No Known Allergies      MEDICATIONS:  MEDICATIONS  (STANDING):  ALBUTerol/ipratropium (CFC free) Inhaler. 1 Puff(s) Inhalation every 4 hours  ampicillin/sulbactam  IVPB 1.5 Gram(s) IV Intermittent every 6 hours  atorvastatin 80 milliGRAM(s) Oral at bedtime  chlorhexidine 0.12% Liquid 15 milliLiter(s) Oral Mucosa two times a day  chlorhexidine 4% Liquid 1 Application(s) Topical daily  fentaNYL   Infusion. 0.5 MICROgram(s)/kG/Hr (4.67 mL/Hr) IV Continuous <Continuous>  heparin  Injectable 5000 Unit(s) SubCutaneous every 8 hours  methylPREDNISolone sodium succinate IVPB 60 milliGRAM(s) IV Intermittent daily  pantoprazole   Suspension 40 milliGRAM(s) Enteral Tube daily  propofol Infusion 10 MICROgram(s)/kG/Min (6.3 mL/Hr) IV Continuous <Continuous>    MEDICATIONS  (PRN):  ALBUTerol/ipratropium (CFC free) Inhaler. 1 Puff(s) Inhalation every 4 hours PRN Wheezing      HOME MEDICATIONS:  Home Medications:  atorvastatin 80 mg oral tablet: 1 tab(s) orally once a day (11 Aug 2019 15:55)  carvedilol 25 mg oral tablet: 1 tab(s) orally 2 times a day (11 Aug 2019 15:54)  furosemide 40 mg oral tablet: 1 tab(s) orally once a day (11 Aug 2019 15:53)  isosorbide mononitrate 120 mg oral tablet, extended release: 1 tab(s) orally once a day (in the morning) (11 Aug 2019 15:54)  lisinopril 40 mg oral tablet: 1 tab(s) orally once a day (11 Aug 2019 15:54)  Low Dose ASA 81 mg oral tablet: 1 tab(s) orally once a day (11 Aug 2019 15:54)      VITALS:   T(F): 98.2 (08-11 @ 12:00), Max: 98.7 (08-11 @ 08:00)  HR: 62 (08-11 @ 16:00) (54 - 124)  BP: 82/50 (08-11 @ 16:00) (65/55 - 157/89)  BP(mean): 63 (08-11 @ 16:00) (59 - 130)  RR: 22 (08-11 @ 16:00) (16 - 23)  SpO2: 100% (08-11 @ 16:00) (97% - 100%)    I&O's Summary    10 Aug 2019 07:01  -  11 Aug 2019 07:00  --------------------------------------------------------  IN: 110.9 mL / OUT: 1225 mL / NET: -1114.1 mL    11 Aug 2019 07:01  -  11 Aug 2019 16:35  --------------------------------------------------------  IN: 813.6 mL / OUT: 410 mL / NET: 403.6 mL        PHYSICAL EXAM:  GEN: intubated   HEENT: no pallor  NECK: Supple, no JVD  LUNGS: +b/l air entry   CARDIOVASCULAR: S1/S2 regular, no murmurs or rubs  ABD: Soft, BS+  EXT: mild LE edema, no cyanosis   NEURO: intubated, sedated     LABS:                        13.4   6.77  )-----------( 161      ( 11 Aug 2019 04:29 )             40.6     08-11    141  |  106  |  23<H>  ----------------------------<  165<H>  4.0   |  23  |  1.0    Ca    9.1      11 Aug 2019 04:29  Phos  1.2     08-11  Mg     2.0     08-11    TPro  6.5  /  Alb  3.9  /  TBili  0.7  /  DBili  x   /  AST  32  /  ALT  30  /  AlkPhos  73  08-11    PT/INR - ( 11 Aug 2019 01:59 )   PT: 13.40 sec;   INR: 1.17 ratio         PTT - ( 11 Aug 2019 01:59 )  PTT:29.5 sec  Troponin T, Serum: 0.13 ng/mL <HH> (08-11-19 @ 04:29)  Lactate, Blood: 1.2 mmol/L (08-11-19 @ 04:29)  Troponin T, Serum: 0.16 ng/mL <HH> (08-11-19 @ 01:52)  Troponin T, Serum: <0.01 ng/mL (08-10-19 @ 20:26)    CARDIAC MARKERS ( 11 Aug 2019 04:29 )  x     / 0.13 ng/mL / x     / x     / x      CARDIAC MARKERS ( 11 Aug 2019 01:52 )  x     / 0.16 ng/mL / x     / x     / x      CARDIAC MARKERS ( 10 Aug 2019 20:26 )  x     / <0.01 ng/mL / x     / x     / x            Troponin trend:    Serum Pro-Brain Natriuretic Peptide: 1050 pg/mL (08-10-19 @ 20:26)      Hemoglobin A1C   Thyroid      RADIOLOGY:  -CXR:  < from: Xray Chest 1 View- PORTABLE-Urgent (08.11.19 @ 05:13) >    Impression:      Pulmonary vascular congestion, decreasing on the most recent radiograph   8/11/2019 at 4:41 AM. Right basilar opacity. Trace left effusion. No   pneumothorax.    Support lines and tubes as described.    < end of copied text >    -CT:  < from: CT Angio Chest w/ IV Cont (08.11.19 @ 00:00) >  IMPRESSION:     No CTA evidence of acute pulmonary embolus.    Reflux of intravenous contrast into the hepatic veins. This can be seen   with right heart dysfunction.    Atelectasis of the right lower lobe. Debris within right mainstem   bronchus.    < end of copied text >  < from: CT Head No Cont (08.10.19 @ 23:59) >  IMPRESSION:    No CT evidence of acute intracranial pathology.    < end of copied text >        ECG:  < from: 12 Lead ECG (08.10.19 @ 20:54) >  Diagnosis Line Normal sinus rhythmwith sinus arrhythmia  Possible Inferior infarct , age undetermined  Cannot rule out Anterior infarct , age undetermined  ST & T wave abnormality, consider lateral ischemia  Abnormal ECG    < end of copied text >    TELEMETRY EVENTS:  few PVCs

## 2019-08-11 NOTE — CONSULT NOTE ADULT - SUBJECTIVE AND OBJECTIVE BOX
Patient is a 56y old  Male who presents with a chief complaint of Chest pain (11 Aug 2019 01:20)      HPI:  Pt was intubated on admission, hx was taken from . Carl Phipps (brother) over the phone.     A 56? yo M (named Mr. Dimitry Frazier) w/ PMH of COPD (smoke 4 packs/day for over 30 years), CAD s/p CABG, DM and hx of illicit drug use presents to the ED unresponsive. 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 Shortly after, pt asked for ambulance and was intubated on site. Pt was endorsing lethargy through out the day. As per brother, pt denies fever, chill, sick contacts, diarrhea, constipation. Denies alcohol use.     In the ED, pt was started on low dose of versed. Given azithromycin and ceftriaxone, multiple doses of duoneb and solumedrol 125 mg x 1. CXR appears to show mild pulmonary edema. CTH NC non remarkable and CT neck and chest show no acute pathology. ICU consulted for intubated patient.     Vital Signs Last 24 Hrs  T(C): 36.7 (10 Aug 2019 23:12), Max: 36.9 (10 Aug 2019 21:20)  T(F): 98.1 (10 Aug 2019 23:12), Max: 98.4 (10 Aug 2019 21:20)  HR: 66 (11 Aug 2019 01:00) (66 - 105)  BP: 134/81 (11 Aug 2019 01:00) (65/55 - 134/81)  BP(mean): 98 (11 Aug 2019 01:00) (85 - 103)  RR: 22 (11 Aug 2019 01:00) (16 - 22)  SpO2: 100% (11 Aug 2019 01:00) (97% - 100%) (11 Aug 2019 01:20)      PAST MEDICAL & SURGICAL HISTORY:  COPD (chronic obstructive pulmonary disease)  DM (diabetes mellitus)  CAD (coronary artery disease) of bypass graft  S/P CABG (coronary artery bypass graft)      SOCIAL HX:   Smoking active                        ETOH                            Other Drug use    FAMILY HISTORY:  :  No known cardiovascular family history     ROS:  See HPI     Allergies    No Known Allergies    Intolerances          PHYSICAL EXAM    ICU Vital Signs Last 24 Hrs  T(C): 36.6 (11 Aug 2019 04:00), Max: 36.9 (10 Aug 2019 21:20)  T(F): 97.9 (11 Aug 2019 04:00), Max: 98.4 (10 Aug 2019 21:20)  HR: 69 (11 Aug 2019 08:43) (54 - 105)  BP: 135/77 (11 Aug 2019 08:00) (65/55 - 155/101)  BP(mean): 103 (11 Aug 2019 08:00) (85 - 130)  RR: 22 (11 Aug 2019 08:00) (16 - 22)  SpO2: 100% (11 Aug 2019 08:43) (97% - 100%)      General: In NAD   HEENT: + ETT, +OGT        Lymphatic system: No cervical LN   Lungs: Bilateral BS  Cardiovascular: Regular  Gastrointestinal: Soft, Positive BS  Musculoskeletal: No clubbing.  Moves all extremities.  Full range of motion   Skin: Warm.  Intact  Neurological: No motor or sensory deficit       08-10-19 @ 07:01  -  08-11-19 @ 07:00  --------------------------------------------------------  IN:    IV PiggyBack: 50 mL    midazolam Infusion: 10.5 mL    propofol Infusion: 50.4 mL  Total IN: 110.9 mL    OUT:    Indwelling Catheter - Urethral: 1225 mL  Total OUT: 1225 mL    Total NET: -1114.1 mL      08-11-19 @ 07:01  -  08-11-19 @ 08:59  --------------------------------------------------------  IN:    propofol Infusion: 16.8 mL  Total IN: 16.8 mL    OUT:    Indwelling Catheter - Urethral: 85 mL  Total OUT: 85 mL    Total NET: -68.2 mL          LABS:                          13.4   6.77  )-----------( 161      ( 11 Aug 2019 04:29 )             40.6                                               08-11    141  |  106  |  23<H>  ----------------------------<  165<H>  4.0   |  23  |  1.0    Ca    9.1      11 Aug 2019 04:29  Phos  1.2     08-11  Mg     2.0     08-11    TPro  6.5  /  Alb  3.9  /  TBili  0.7  /  DBili  x   /  AST  32  /  ALT  30  /  AlkPhos  73  08-11      PT/INR - ( 11 Aug 2019 01:59 )   PT: 13.40 sec;   INR: 1.17 ratio         PTT - ( 11 Aug 2019 01:59 )  PTT:29.5 sec                                       Urinalysis Basic - ( 11 Aug 2019 02:06 )    Color: Yellow / Appearance: Clear / SG: >1.050 / pH: x  Gluc: x / Ketone: Negative  / Bili: Negative / Urobili: 12 mg/dL   Blood: x / Protein: 30 mg/dL / Nitrite: Negative   Leuk Esterase: Negative / RBC: 21 /HPF / WBC 4 /HPF   Sq Epi: x / Non Sq Epi: 4 /HPF / Bacteria: Negative        CARDIAC MARKERS ( 11 Aug 2019 04:29 )  x     / 0.13 ng/mL / x     / x     / x      CARDIAC MARKERS ( 11 Aug 2019 01:52 )  x     / 0.16 ng/mL / x     / x     / x      CARDIAC MARKERS ( 10 Aug 2019 20:26 )  x     / <0.01 ng/mL / x     / x     / x        LIVER FUNCTIONS - ( 11 Aug 2019 04:29 )  Alb: 3.9 g/dL / Pro: 6.5 g/dL / ALK PHOS: 73 U/L / ALT: 30 U/L / AST: 32 U/L / GGT: x                                                                                               Mode: AC/ CMV (Assist Control/ Continuous Mandatory Ventilation)  RR (machine): 22  TV (machine): 450  FiO2: 100  PEEP: 8  ITime: 1  MAP: 16  PIP: 30   ABG - ( 11 Aug 2019 05:16 )  pH, Arterial: 7.38  pH, Blood: x     /  pCO2: 47    /  pO2: 62    / HCO3: 28    / Base Excess: 2.2   /  SaO2: 92                  X-Rays   RLL opacity                                                                                  ECHO    MEDICATIONS  (STANDING):  ALBUTerol/ipratropium (CFC free) Inhaler. 1 Puff(s) Inhalation every 4 hours  ampicillin/sulbactam  IVPB 1.5 Gram(s) IV Intermittent every 6 hours  chlorhexidine 0.12% Liquid 15 milliLiter(s) Oral Mucosa two times a day  chlorhexidine 4% Liquid 1 Application(s) Topical daily  heparin  Injectable 5000 Unit(s) SubCutaneous every 8 hours  methylPREDNISolone sodium succinate IVPB 60 milliGRAM(s) IV Intermittent every 8 hours  propofol Infusion 10 MICROgram(s)/kG/Min (6.3 mL/Hr) IV Continuous <Continuous>    MEDICATIONS  (PRN):  ALBUTerol/ipratropium (CFC free) Inhaler. 1 Puff(s) Inhalation every 4 hours PRN Wheezing

## 2019-08-11 NOTE — H&P ADULT - HISTORY OF PRESENT ILLNESS
Pt was intubated on admission, hx was taken from Mr. Carl Phipps (brother) over the phone.     A 56? yo M (named MrWild Frazier) w/ PMH of COPD (smoke 4 packs/day for over 30 years), CAD s/p CABG, DM and hx of illicit drug use presents to the ED unresponsive. 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 Shortly after, pt asked for ambulance and was intubated on site. Pt was endorsing lethargy through out the day. As per brother, pt denies fever, chill, sick contacts, diarrhea, constipation. Denies alcohol use.     In the ED, pt was started on low dose of versed. Given azithromycin and ceftriaxone, multiple doses of duoneb and solumedrol 125 mg x 1. CXR appears to show mild pulmonary edema. CTH NC non remarkable and CT neck and chest show no acute pathology. ICU consulted for intubated patient.     Vital Signs Last 24 Hrs  T(C): 36.7 (10 Aug 2019 23:12), Max: 36.9 (10 Aug 2019 21:20)  T(F): 98.1 (10 Aug 2019 23:12), Max: 98.4 (10 Aug 2019 21:20)  HR: 66 (11 Aug 2019 01:00) (66 - 105)  BP: 134/81 (11 Aug 2019 01:00) (65/55 - 134/81)  BP(mean): 98 (11 Aug 2019 01:00) (85 - 103)  RR: 22 (11 Aug 2019 01:00) (16 - 22)  SpO2: 100% (11 Aug 2019 01:00) (97% - 100%)

## 2019-08-11 NOTE — H&P ADULT - NSICDXPASTMEDICALHX_GEN_ALL_CORE_FT
PAST MEDICAL HISTORY:  CAD (coronary artery disease) of bypass graft     COPD (chronic obstructive pulmonary disease)     DM (diabetes mellitus)

## 2019-08-11 NOTE — H&P ADULT - NSHPPHYSICALEXAM_GEN_ALL_CORE
GENERAL: Intubated, unresponsive, 56y M  HEAD:  Atraumatic, Normocephalic  EYES: Pupil constricted, conjunctiva clear and sclera white  NECK: Supple, No JVD  CHEST/LUNG: ET tube noted, Crackles bilaterally; Mild wheezing; No accessory muscles used  HEART: Regular rate and rhythm; No murmurs;   ABDOMEN: Soft, distended; Bowel sounds present; No guarding  EXTREMITIES:  2+ Peripheral Pulses, No cyanosis or edema  NEUROLOGY: intubated, on sedation

## 2019-08-11 NOTE — H&P ADULT - NSHPSOCIALHISTORY_GEN_ALL_CORE
Works as an    Smokes 4 packs/day for more than 30 years  Denies use of alcohol  Hx of illicit drug use (including meth)

## 2019-08-12 LAB
ALBUMIN SERPL ELPH-MCNC: 3.7 G/DL — SIGNIFICANT CHANGE UP (ref 3.5–5.2)
ALP SERPL-CCNC: 62 U/L — SIGNIFICANT CHANGE UP (ref 30–115)
ALT FLD-CCNC: 28 U/L — SIGNIFICANT CHANGE UP (ref 0–41)
AMPHET UR-MCNC: NEGATIVE — SIGNIFICANT CHANGE UP
ANION GAP SERPL CALC-SCNC: 15 MMOL/L — HIGH (ref 7–14)
AST SERPL-CCNC: 70 U/L — HIGH (ref 0–41)
BARBITURATES UR SCN-MCNC: NEGATIVE — SIGNIFICANT CHANGE UP
BENZODIAZ UR-MCNC: POSITIVE
BILIRUB SERPL-MCNC: 0.4 MG/DL — SIGNIFICANT CHANGE UP (ref 0.2–1.2)
BUN SERPL-MCNC: 29 MG/DL — HIGH (ref 10–20)
CALCIUM SERPL-MCNC: 8.9 MG/DL — SIGNIFICANT CHANGE UP (ref 8.5–10.1)
CHLORIDE SERPL-SCNC: 102 MMOL/L — SIGNIFICANT CHANGE UP (ref 98–110)
CO2 SERPL-SCNC: 25 MMOL/L — SIGNIFICANT CHANGE UP (ref 17–32)
COCAINE METAB.OTHER UR-MCNC: NEGATIVE — SIGNIFICANT CHANGE UP
CREAT SERPL-MCNC: 1 MG/DL — SIGNIFICANT CHANGE UP (ref 0.7–1.5)
CULTURE RESULTS: NO GROWTH — SIGNIFICANT CHANGE UP
GAS PNL BLDA: SIGNIFICANT CHANGE UP
GLUCOSE BLDC GLUCOMTR-MCNC: 158 MG/DL — HIGH (ref 70–99)
GLUCOSE BLDC GLUCOMTR-MCNC: 205 MG/DL — HIGH (ref 70–99)
GLUCOSE SERPL-MCNC: 146 MG/DL — HIGH (ref 70–99)
GRAM STN FLD: SIGNIFICANT CHANGE UP
HCT VFR BLD CALC: 42.5 % — SIGNIFICANT CHANGE UP (ref 42–52)
HCV AB S/CO SERPL IA: 0.07 S/CO — SIGNIFICANT CHANGE UP (ref 0–0.99)
HCV AB SERPL-IMP: SIGNIFICANT CHANGE UP
HGB BLD-MCNC: 13.6 G/DL — LOW (ref 14–18)
HIV 1+2 AB+HIV1 P24 AG SERPL QL IA: SIGNIFICANT CHANGE UP
MAGNESIUM SERPL-MCNC: 2.1 MG/DL — SIGNIFICANT CHANGE UP (ref 1.8–2.4)
MCHC RBC-ENTMCNC: 29 PG — SIGNIFICANT CHANGE UP (ref 27–31)
MCHC RBC-ENTMCNC: 32 G/DL — SIGNIFICANT CHANGE UP (ref 32–37)
MCV RBC AUTO: 90.6 FL — SIGNIFICANT CHANGE UP (ref 80–94)
METHADONE UR-MCNC: NEGATIVE — SIGNIFICANT CHANGE UP
NRBC # BLD: 0 /100 WBCS — SIGNIFICANT CHANGE UP (ref 0–0)
OPIATES UR-MCNC: NEGATIVE — SIGNIFICANT CHANGE UP
PCP SPEC-MCNC: SIGNIFICANT CHANGE UP
PHOSPHATE SERPL-MCNC: 4.3 MG/DL — SIGNIFICANT CHANGE UP (ref 2.1–4.9)
PLATELET # BLD AUTO: 149 K/UL — SIGNIFICANT CHANGE UP (ref 130–400)
POTASSIUM SERPL-MCNC: 4.4 MMOL/L — SIGNIFICANT CHANGE UP (ref 3.5–5)
POTASSIUM SERPL-SCNC: 4.4 MMOL/L — SIGNIFICANT CHANGE UP (ref 3.5–5)
PROPOXYPHENE QUALITATIVE URINE RESULT: NEGATIVE — SIGNIFICANT CHANGE UP
PROT SERPL-MCNC: 6.3 G/DL — SIGNIFICANT CHANGE UP (ref 6–8)
RBC # BLD: 4.69 M/UL — LOW (ref 4.7–6.1)
RBC # FLD: 13.8 % — SIGNIFICANT CHANGE UP (ref 11.5–14.5)
SODIUM SERPL-SCNC: 142 MMOL/L — SIGNIFICANT CHANGE UP (ref 135–146)
SPECIMEN SOURCE: SIGNIFICANT CHANGE UP
SPECIMEN SOURCE: SIGNIFICANT CHANGE UP
WBC # BLD: 11.08 K/UL — HIGH (ref 4.8–10.8)
WBC # FLD AUTO: 11.08 K/UL — HIGH (ref 4.8–10.8)

## 2019-08-12 PROCEDURE — 74018 RADEX ABDOMEN 1 VIEW: CPT | Mod: 26

## 2019-08-12 PROCEDURE — 99222 1ST HOSP IP/OBS MODERATE 55: CPT

## 2019-08-12 PROCEDURE — 71045 X-RAY EXAM CHEST 1 VIEW: CPT | Mod: 26

## 2019-08-12 RX ORDER — ACETAMINOPHEN 500 MG
650 TABLET ORAL EVERY 6 HOURS
Refills: 0 | Status: DISCONTINUED | OUTPATIENT
Start: 2019-08-12 | End: 2019-08-23

## 2019-08-12 RX ORDER — SODIUM CHLORIDE 9 MG/ML
500 INJECTION, SOLUTION INTRAVENOUS ONCE
Refills: 0 | Status: COMPLETED | OUTPATIENT
Start: 2019-08-12 | End: 2019-08-12

## 2019-08-12 RX ORDER — ACETAMINOPHEN 500 MG
650 TABLET ORAL EVERY 6 HOURS
Refills: 0 | Status: DISCONTINUED | OUTPATIENT
Start: 2019-08-12 | End: 2019-08-12

## 2019-08-12 RX ORDER — SENNA PLUS 8.6 MG/1
1 TABLET ORAL AT BEDTIME
Refills: 0 | Status: DISCONTINUED | OUTPATIENT
Start: 2019-08-12 | End: 2019-08-23

## 2019-08-12 RX ORDER — FENTANYL CITRATE 50 UG/ML
2.5 INJECTION INTRAVENOUS ONCE
Refills: 0 | Status: DISCONTINUED | OUTPATIENT
Start: 2019-08-12 | End: 2019-08-12

## 2019-08-12 RX ORDER — NOREPINEPHRINE BITARTRATE/D5W 8 MG/250ML
0.05 PLASTIC BAG, INJECTION (ML) INTRAVENOUS
Qty: 8 | Refills: 0 | Status: DISCONTINUED | OUTPATIENT
Start: 2019-08-12 | End: 2019-08-15

## 2019-08-12 RX ORDER — PANTOPRAZOLE SODIUM 20 MG/1
40 TABLET, DELAYED RELEASE ORAL DAILY
Refills: 0 | Status: DISCONTINUED | OUTPATIENT
Start: 2019-08-12 | End: 2019-08-12

## 2019-08-12 RX ORDER — FUROSEMIDE 40 MG
40 TABLET ORAL
Refills: 0 | Status: DISCONTINUED | OUTPATIENT
Start: 2019-08-12 | End: 2019-08-14

## 2019-08-12 RX ORDER — DOCUSATE SODIUM 100 MG
100 CAPSULE ORAL DAILY
Refills: 0 | Status: DISCONTINUED | OUTPATIENT
Start: 2019-08-12 | End: 2019-08-23

## 2019-08-12 RX ADMIN — Medication 40 MILLIGRAM(S): at 18:29

## 2019-08-12 RX ADMIN — HEPARIN SODIUM 5000 UNIT(S): 5000 INJECTION INTRAVENOUS; SUBCUTANEOUS at 14:07

## 2019-08-12 RX ADMIN — CHLORHEXIDINE GLUCONATE 15 MILLILITER(S): 213 SOLUTION TOPICAL at 05:02

## 2019-08-12 RX ADMIN — Medication 100 MILLIGRAM(S): at 11:04

## 2019-08-12 RX ADMIN — CHLORHEXIDINE GLUCONATE 1 APPLICATION(S): 213 SOLUTION TOPICAL at 05:02

## 2019-08-12 RX ADMIN — CHLORHEXIDINE GLUCONATE 15 MILLILITER(S): 213 SOLUTION TOPICAL at 18:29

## 2019-08-12 RX ADMIN — Medication 101.92 MILLIGRAM(S): at 05:02

## 2019-08-12 RX ADMIN — AMPICILLIN SODIUM AND SULBACTAM SODIUM 100 GRAM(S): 250; 125 INJECTION, POWDER, FOR SUSPENSION INTRAMUSCULAR; INTRAVENOUS at 11:01

## 2019-08-12 RX ADMIN — ATORVASTATIN CALCIUM 80 MILLIGRAM(S): 80 TABLET, FILM COATED ORAL at 21:07

## 2019-08-12 RX ADMIN — SODIUM CHLORIDE 500 MILLILITER(S): 9 INJECTION, SOLUTION INTRAVENOUS at 02:39

## 2019-08-12 RX ADMIN — Medication 8.76 MICROGRAM(S)/KG/MIN: at 20:04

## 2019-08-12 RX ADMIN — Medication 650 MILLIGRAM(S): at 21:30

## 2019-08-12 RX ADMIN — HEPARIN SODIUM 5000 UNIT(S): 5000 INJECTION INTRAVENOUS; SUBCUTANEOUS at 05:01

## 2019-08-12 RX ADMIN — Medication 650 MILLIGRAM(S): at 21:00

## 2019-08-12 RX ADMIN — PANTOPRAZOLE SODIUM 40 MILLIGRAM(S): 20 TABLET, DELAYED RELEASE ORAL at 11:03

## 2019-08-12 RX ADMIN — AMPICILLIN SODIUM AND SULBACTAM SODIUM 100 GRAM(S): 250; 125 INJECTION, POWDER, FOR SUSPENSION INTRAMUSCULAR; INTRAVENOUS at 05:00

## 2019-08-12 RX ADMIN — AMPICILLIN SODIUM AND SULBACTAM SODIUM 100 GRAM(S): 250; 125 INJECTION, POWDER, FOR SUSPENSION INTRAMUSCULAR; INTRAVENOUS at 18:29

## 2019-08-12 RX ADMIN — Medication 1 PUFF(S): at 08:32

## 2019-08-12 RX ADMIN — SENNA PLUS 1 TABLET(S): 8.6 TABLET ORAL at 21:07

## 2019-08-12 RX ADMIN — Medication 8.76 MICROGRAM(S)/KG/MIN: at 04:47

## 2019-08-12 RX ADMIN — HEPARIN SODIUM 5000 UNIT(S): 5000 INJECTION INTRAVENOUS; SUBCUTANEOUS at 21:07

## 2019-08-12 RX ADMIN — PROPOFOL 6.3 MICROGRAM(S)/KG/MIN: 10 INJECTION, EMULSION INTRAVENOUS at 23:00

## 2019-08-12 RX ADMIN — Medication 81 MILLIGRAM(S): at 11:04

## 2019-08-12 RX ADMIN — AMPICILLIN SODIUM AND SULBACTAM SODIUM 100 GRAM(S): 250; 125 INJECTION, POWDER, FOR SUSPENSION INTRAMUSCULAR; INTRAVENOUS at 00:47

## 2019-08-12 RX ADMIN — Medication 1 PUFF(S): at 12:25

## 2019-08-12 NOTE — PROGRESS NOTE ADULT - ASSESSMENT
IMPRESSION:    Acute respiratory failure/ CHF/ 20 TO 25%  RLL PNA  RO aspiration   ? COPD      PLAN:    CNS: SAT    HEENT: Oral care    PULMONARY:  HOB @ 45 degrees.  repeat ABG adjust setting accordingly    CARDIOVASCULAR: Cardiology. Check 2D echo. Give aspirin. EKG, LASIX 40 IV Q 12H    GI: GI prophylaxis.  OG Feeding , FLAT PLATE    RENAL:  Follow up lytes.  Correct as needed.    INFECTIOUS DISEASE: Follow up cultures. Continue Unasyn.    HEMATOLOGICAL:  DVT prophylaxis.    ENDOCRINE:  Follow up FS.    MUSCULOSKELETAL: Bedrest.  POOR PROGNOSIS

## 2019-08-12 NOTE — PROGRESS NOTE ADULT - SUBJECTIVE AND OBJECTIVE BOX
OVERNIGHT EVENTS: still  intubated, ventilated, on propofol, fentanyl, off levophed, events overnight noted, echo 20 to 25%    Vital Signs Last 24 Hrs  T(C): 36.6 (12 Aug 2019 08:00), Max: 36.9 (12 Aug 2019 00:00)  T(F): 97.8 (12 Aug 2019 08:00), Max: 98.4 (12 Aug 2019 00:00)  HR: 52 (12 Aug 2019 08:00) (49 - 124)  BP: 100/47 (12 Aug 2019 08:00) (78/45 - 157/89)  BP(mean): 67 (12 Aug 2019 08:00) (57 - 124)  RR: 21 (12 Aug 2019 08:00) (21 - 23)  SpO2: 100% (12 Aug 2019 08:00) (99% - 100%)    PHYSICAL EXAMINATION:    GENERAL: awake, do not follow commands    HEENT: Head is normocephalic and atraumatic. Extraocular muscles are intact. Mucous membranes are moist.    NECK: Supple.    LUNGS: dec bs r side    HEART: Regular rate and rhythm without murmur.    ABDOMEN: Soft, distended    EXTREMITIES: Without any cyanosis, clubbing, rash, lesions or edema.    NEUROLOGIC: Grossly intact.    SKIN: No ulceration or induration present.      LABS:                        13.6   11.08 )-----------( 149      ( 12 Aug 2019 05:20 )             42.5     08-12    142  |  102  |  29<H>  ----------------------------<  146<H>  4.4   |  25  |  1.0    Ca    8.9      12 Aug 2019 05:20  Phos  4.3     08-12  Mg     2.1     08-12    TPro  6.3  /  Alb  3.7  /  TBili  0.4  /  DBili  x   /  AST  70<H>  /  ALT  28  /  AlkPhos  62  08-12    PT/INR - ( 11 Aug 2019 01:59 )   PT: 13.40 sec;   INR: 1.17 ratio         PTT - ( 11 Aug 2019 01:59 )  PTT:29.5 sec  Urinalysis Basic - ( 11 Aug 2019 02:06 )    Color: Yellow / Appearance: Clear / SG: >1.050 / pH: x  Gluc: x / Ketone: Negative  / Bili: Negative / Urobili: 12 mg/dL   Blood: x / Protein: 30 mg/dL / Nitrite: Negative   Leuk Esterase: Negative / RBC: 21 /HPF / WBC 4 /HPF   Sq Epi: x / Non Sq Epi: 4 /HPF / Bacteria: Negative      ABG - ( 11 Aug 2019 15:24 )  pH, Arterial: 7.35  pH, Blood: x     /  pCO2: 48    /  pO2: 90    / HCO3: 27    / Base Excess: 0.3   /  SaO2: 97        450/22/8/10  29/25      CARDIAC MARKERS ( 11 Aug 2019 04:29 )  x     / 0.13 ng/mL / x     / x     / x      CARDIAC MARKERS ( 11 Aug 2019 01:52 )  x     / 0.16 ng/mL / x     / x     / x      CARDIAC MARKERS ( 10 Aug 2019 20:26 )  x     / <0.01 ng/mL / x     / x     / x            Serum Pro-Brain Natriuretic Peptide: 1050 pg/mL (08-10-19 @ 20:26)            08-11-19 @ 07:01  -  08-12-19 @ 07:00  --------------------------------------------------------  IN: 1866.1 mL / OUT: 925 mL / NET: 941.1 mL    08-12-19 @ 07:01  -  08-12-19 @ 08:41  --------------------------------------------------------  IN: 176 mL / OUT: 60 mL / NET: 116 mL        MICROBIOLOGY:  Culture Results:   No growth (08-11 @ 02:06)      MEDICATIONS  (STANDING):  ALBUTerol/ipratropium (CFC free) Inhaler. 1 Puff(s) Inhalation every 4 hours  ampicillin/sulbactam  IVPB 1.5 Gram(s) IV Intermittent every 6 hours  aspirin  chewable 81 milliGRAM(s) Enteral Tube daily  atorvastatin 80 milliGRAM(s) Oral at bedtime  chlorhexidine 0.12% Liquid 15 milliLiter(s) Oral Mucosa two times a day  chlorhexidine 4% Liquid 1 Application(s) Topical daily  fentaNYL   Infusion. 0.5 MICROgram(s)/kG/Hr (4.67 mL/Hr) IV Continuous <Continuous>  heparin  Injectable 5000 Unit(s) SubCutaneous every 8 hours  methylPREDNISolone sodium succinate IVPB 60 milliGRAM(s) IV Intermittent daily  norepinephrine Infusion 0.05 MICROgram(s)/kG/Min (8.756 mL/Hr) IV Continuous <Continuous>  pantoprazole   Suspension 40 milliGRAM(s) Enteral Tube daily  propofol Infusion 10 MICROgram(s)/kG/Min (6.3 mL/Hr) IV Continuous <Continuous>    MEDICATIONS  (PRN):  ALBUTerol/ipratropium (CFC free) Inhaler. 1 Puff(s) Inhalation every 4 hours PRN Wheezing      RADIOLOGY & ADDITIONAL STUDIES:

## 2019-08-12 NOTE — PROGRESS NOTE ADULT - ASSESSMENT
A 60 yo M (named Mr. Dimitry Frazier) w/ PMH of COPD (smoke 4 packs/day for over 30 years), CAD s/p CABG, DM and hx of illicit drug use presents to the ED unresponsive.      on vent, sedated with midazolam and propofol     #RS failure: Acute decomp HF VS COPD VS pneumonia   -LASIX 40 IV Q 12H  -solumedrol 60 mg q8h  -albuterol/ipratropium q4h  -c/w unasyn, f/u cultures  -Monitor Is & Os      #CHF AND ACS, EF 20-25%  -c/w ASA and statin  --restart BB and ACEi when BP permits  -repeat troponin and BNP    #DVT ppx  -UFH    #DM: monitor FS    #GI prophylaxis. protonix,  OG Feeding A 60 yo M (named Mr. Dimitry Frazier) w/ PMH of COPD (smoke 4 packs/day for over 30 years), CAD s/p CABG, DM and hx of illicit drug use presents to the ED unresponsive.      on vent, sedated with midazolam and propofol, no pressors     #RS failure: Acute decomp HF VS COPD VS pneumonia   -LASIX 40 IV Q 12H  -solumedrol 60 mg q8h  -albuterol/ipratropium q4h  -c/w unasyn, f/u cultures  -Monitor Is & Os      #CHF AND ACS, EF 20-25%  -c/w ASA and statin  --restart BB and ACEi when BP permits, currently off pressors   -repeat troponin and BNP    #DVT ppx  -UFH    #DM: monitor FS    #GI prophylaxis. protonix,  OG Feeding

## 2019-08-12 NOTE — CHART NOTE - NSCHARTNOTEFT_GEN_A_CORE
I received a call from the nurse around 11:00pm that patient was alert and restless. Propofol was running at 20 gc/hr, Fentanyl 1. However, BP 91/47 and drops when propofol increased. Asked nurse to increase Fentanyl to 2.5. Patient calmed down and went to sleep. However, at around 2:30am that patient was awake once again and restless. Asked to give nurse 2.5 IV push fentanyl and 500 cc bolus of LR. Patient initially responded to the fentanyl push but an hour later woke up and was restless. BP at that time was 81/45. At that point we requested nurse increase fentanyl to __ and that levophed be started at .5mcg. BP responded to 99/73. I received a call from the nurse around 11:00pm that patient was alert and restless. Propofol was running at 20 ml/hr, Fentanyl 1. However, BP 91/47 and drops when propofol increased. Asked nurse to increase Fentanyl to 2.5. Patient calmed down and went to sleep. However, at around 2:30am that patient was awake once again and restless. Asked to give nurse 2.5 IV push fentanyl and 500 cc bolus of LR. Patient initially responded to the fentanyl push but an hour later woke up. BP at that time was 81/45. At that point we requested nurse to start levophed at .05mcg/kg/hr and titrate up with goal MAP of 65, propofol of 18mcg/hr, fentanyl of 30mcg/hr. BP increased to 99/73.    Case was discussed with senior resident. I received a call from the nurse around 11:00pm that patient was alert and restless. Propofol was running at 20 ml/hr, Fentanyl 1. However, BP 91/47 and drops when propofol increased. Asked nurse to increase Fentanyl to 2.5. Patient calmed down and went to sleep. However, at around 2:30am that patient was awake once again and restless. Asked to give nurse 2.5 IV push fentanyl and 500 cc bolus of LR. Patient initially responded to the fentanyl push but an hour later woke up. BP at that time was 81/45. At that point we requested nurse to start levophed at .05mcg/kg/hr and titrate up with goal MAP of 65, propofol of 32.12mcg/hr, fentanyl of 3.2mcg/hr. BP increased to 99/73 and patient remained calm for the rest of the night.    Case was discussed with senior resident.

## 2019-08-13 LAB
ANION GAP SERPL CALC-SCNC: 12 MMOL/L — SIGNIFICANT CHANGE UP (ref 7–14)
BASE EXCESS BLDA CALC-SCNC: 10 MMOL/L — HIGH (ref -2–2)
BASE EXCESS BLDA CALC-SCNC: 5.5 MMOL/L — HIGH (ref -2–2)
BASE EXCESS BLDA CALC-SCNC: 6.7 MMOL/L — HIGH (ref -2–2)
BASOPHILS # BLD AUTO: 0 K/UL — SIGNIFICANT CHANGE UP (ref 0–0.2)
BASOPHILS # BLD AUTO: 0.01 K/UL — SIGNIFICANT CHANGE UP (ref 0–0.2)
BASOPHILS NFR BLD AUTO: 0 % — SIGNIFICANT CHANGE UP (ref 0–1)
BASOPHILS NFR BLD AUTO: 0.1 % — SIGNIFICANT CHANGE UP (ref 0–1)
BUN SERPL-MCNC: 32 MG/DL — HIGH (ref 10–20)
CALCIUM SERPL-MCNC: 8.6 MG/DL — SIGNIFICANT CHANGE UP (ref 8.5–10.1)
CHLORIDE SERPL-SCNC: 108 MMOL/L — SIGNIFICANT CHANGE UP (ref 98–110)
CO2 SERPL-SCNC: 27 MMOL/L — SIGNIFICANT CHANGE UP (ref 17–32)
CREAT SERPL-MCNC: 1 MG/DL — SIGNIFICANT CHANGE UP (ref 0.7–1.5)
EOSINOPHIL # BLD AUTO: 0 K/UL — SIGNIFICANT CHANGE UP (ref 0–0.7)
EOSINOPHIL # BLD AUTO: 0 K/UL — SIGNIFICANT CHANGE UP (ref 0–0.7)
EOSINOPHIL NFR BLD AUTO: 0 % — SIGNIFICANT CHANGE UP (ref 0–8)
EOSINOPHIL NFR BLD AUTO: 0 % — SIGNIFICANT CHANGE UP (ref 0–8)
GLUCOSE BLDC GLUCOMTR-MCNC: 137 MG/DL — HIGH (ref 70–99)
GLUCOSE BLDC GLUCOMTR-MCNC: 167 MG/DL — HIGH (ref 70–99)
GLUCOSE BLDC GLUCOMTR-MCNC: 195 MG/DL — HIGH (ref 70–99)
GLUCOSE SERPL-MCNC: 183 MG/DL — HIGH (ref 70–99)
HCO3 BLDA-SCNC: 31 MMOL/L — HIGH (ref 23–27)
HCO3 BLDA-SCNC: 32 MMOL/L — HIGH (ref 23–27)
HCO3 BLDA-SCNC: 35 MMOL/L — HIGH (ref 23–27)
HCT VFR BLD CALC: 40.4 % — LOW (ref 42–52)
HCT VFR BLD CALC: 41.3 % — LOW (ref 42–52)
HGB BLD-MCNC: 12.9 G/DL — LOW (ref 14–18)
HGB BLD-MCNC: 13.2 G/DL — LOW (ref 14–18)
HOROWITZ INDEX BLDA+IHG-RTO: 70 — SIGNIFICANT CHANGE UP
IMM GRANULOCYTES NFR BLD AUTO: 0.3 % — SIGNIFICANT CHANGE UP (ref 0.1–0.3)
IMM GRANULOCYTES NFR BLD AUTO: 0.5 % — HIGH (ref 0.1–0.3)
LYMPHOCYTES # BLD AUTO: 0.43 K/UL — LOW (ref 1.2–3.4)
LYMPHOCYTES # BLD AUTO: 1.27 K/UL — SIGNIFICANT CHANGE UP (ref 1.2–3.4)
LYMPHOCYTES # BLD AUTO: 10.5 % — LOW (ref 20.5–51.1)
LYMPHOCYTES # BLD AUTO: 5.2 % — LOW (ref 20.5–51.1)
MCHC RBC-ENTMCNC: 29.3 PG — SIGNIFICANT CHANGE UP (ref 27–31)
MCHC RBC-ENTMCNC: 29.7 PG — SIGNIFICANT CHANGE UP (ref 27–31)
MCHC RBC-ENTMCNC: 31.9 G/DL — LOW (ref 32–37)
MCHC RBC-ENTMCNC: 32 G/DL — SIGNIFICANT CHANGE UP (ref 32–37)
MCV RBC AUTO: 91.8 FL — SIGNIFICANT CHANGE UP (ref 80–94)
MCV RBC AUTO: 92.8 FL — SIGNIFICANT CHANGE UP (ref 80–94)
MONOCYTES # BLD AUTO: 0.33 K/UL — SIGNIFICANT CHANGE UP (ref 0.1–0.6)
MONOCYTES # BLD AUTO: 1.48 K/UL — HIGH (ref 0.1–0.6)
MONOCYTES NFR BLD AUTO: 12.2 % — HIGH (ref 1.7–9.3)
MONOCYTES NFR BLD AUTO: 4 % — SIGNIFICANT CHANGE UP (ref 1.7–9.3)
NEUTROPHILS # BLD AUTO: 7.45 K/UL — HIGH (ref 1.4–6.5)
NEUTROPHILS # BLD AUTO: 9.3 K/UL — HIGH (ref 1.4–6.5)
NEUTROPHILS NFR BLD AUTO: 77 % — HIGH (ref 42.2–75.2)
NEUTROPHILS NFR BLD AUTO: 90.2 % — HIGH (ref 42.2–75.2)
NRBC # BLD: 0 /100 WBCS — SIGNIFICANT CHANGE UP (ref 0–0)
NRBC # BLD: 0 /100 WBCS — SIGNIFICANT CHANGE UP (ref 0–0)
NT-PROBNP SERPL-SCNC: 640 PG/ML — HIGH (ref 0–300)
PCO2 BLDA: 48 MMHG — HIGH (ref 38–42)
PCO2 BLDA: 48 MMHG — HIGH (ref 38–42)
PCO2 BLDA: 49 MMHG — HIGH (ref 38–42)
PH BLDA: 7.42 — SIGNIFICANT CHANGE UP (ref 7.38–7.42)
PH BLDA: 7.44 — HIGH (ref 7.38–7.42)
PH BLDA: 7.47 — HIGH (ref 7.38–7.42)
PLATELET # BLD AUTO: 127 K/UL — LOW (ref 130–400)
PLATELET # BLD AUTO: 144 K/UL — SIGNIFICANT CHANGE UP (ref 130–400)
PO2 BLDA: 101 MMHG — HIGH (ref 78–95)
PO2 BLDA: 82 MMHG — SIGNIFICANT CHANGE UP (ref 78–95)
PO2 BLDA: 89 MMHG — SIGNIFICANT CHANGE UP (ref 78–95)
POTASSIUM SERPL-MCNC: 3.8 MMOL/L — SIGNIFICANT CHANGE UP (ref 3.5–5)
POTASSIUM SERPL-SCNC: 3.8 MMOL/L — SIGNIFICANT CHANGE UP (ref 3.5–5)
RBC # BLD: 4.4 M/UL — LOW (ref 4.7–6.1)
RBC # BLD: 4.45 M/UL — LOW (ref 4.7–6.1)
RBC # FLD: 14.3 % — SIGNIFICANT CHANGE UP (ref 11.5–14.5)
RBC # FLD: 14.4 % — SIGNIFICANT CHANGE UP (ref 11.5–14.5)
SAO2 % BLDA: 97 % — SIGNIFICANT CHANGE UP (ref 94–98)
SAO2 % BLDA: 97 % — SIGNIFICANT CHANGE UP (ref 94–98)
SAO2 % BLDA: 98 % — SIGNIFICANT CHANGE UP (ref 94–98)
SODIUM SERPL-SCNC: 147 MMOL/L — HIGH (ref 135–146)
WBC # BLD: 12.09 K/UL — HIGH (ref 4.8–10.8)
WBC # BLD: 8.26 K/UL — SIGNIFICANT CHANGE UP (ref 4.8–10.8)
WBC # FLD AUTO: 12.09 K/UL — HIGH (ref 4.8–10.8)
WBC # FLD AUTO: 8.26 K/UL — SIGNIFICANT CHANGE UP (ref 4.8–10.8)

## 2019-08-13 PROCEDURE — 99232 SBSQ HOSP IP/OBS MODERATE 35: CPT

## 2019-08-13 PROCEDURE — 71045 X-RAY EXAM CHEST 1 VIEW: CPT | Mod: 26

## 2019-08-13 RX ADMIN — CHLORHEXIDINE GLUCONATE 1 APPLICATION(S): 213 SOLUTION TOPICAL at 05:16

## 2019-08-13 RX ADMIN — AMPICILLIN SODIUM AND SULBACTAM SODIUM 100 GRAM(S): 250; 125 INJECTION, POWDER, FOR SUSPENSION INTRAMUSCULAR; INTRAVENOUS at 00:24

## 2019-08-13 RX ADMIN — FENTANYL CITRATE 4.67 MICROGRAM(S)/KG/HR: 50 INJECTION INTRAVENOUS at 00:23

## 2019-08-13 RX ADMIN — Medication 650 MILLIGRAM(S): at 05:13

## 2019-08-13 RX ADMIN — Medication 650 MILLIGRAM(S): at 09:19

## 2019-08-13 RX ADMIN — AMPICILLIN SODIUM AND SULBACTAM SODIUM 100 GRAM(S): 250; 125 INJECTION, POWDER, FOR SUSPENSION INTRAMUSCULAR; INTRAVENOUS at 18:14

## 2019-08-13 RX ADMIN — Medication 81 MILLIGRAM(S): at 11:25

## 2019-08-13 RX ADMIN — Medication 650 MILLIGRAM(S): at 11:33

## 2019-08-13 RX ADMIN — SENNA PLUS 1 TABLET(S): 8.6 TABLET ORAL at 21:24

## 2019-08-13 RX ADMIN — HEPARIN SODIUM 5000 UNIT(S): 5000 INJECTION INTRAVENOUS; SUBCUTANEOUS at 14:02

## 2019-08-13 RX ADMIN — CHLORHEXIDINE GLUCONATE 15 MILLILITER(S): 213 SOLUTION TOPICAL at 18:14

## 2019-08-13 RX ADMIN — Medication 40 MILLIGRAM(S): at 05:13

## 2019-08-13 RX ADMIN — Medication 40 MILLIGRAM(S): at 18:14

## 2019-08-13 RX ADMIN — Medication 60 MILLIGRAM(S): at 05:14

## 2019-08-13 RX ADMIN — Medication 1 PUFF(S): at 17:24

## 2019-08-13 RX ADMIN — Medication 650 MILLIGRAM(S): at 14:01

## 2019-08-13 RX ADMIN — AMPICILLIN SODIUM AND SULBACTAM SODIUM 100 GRAM(S): 250; 125 INJECTION, POWDER, FOR SUSPENSION INTRAMUSCULAR; INTRAVENOUS at 12:06

## 2019-08-13 RX ADMIN — AMPICILLIN SODIUM AND SULBACTAM SODIUM 100 GRAM(S): 250; 125 INJECTION, POWDER, FOR SUSPENSION INTRAMUSCULAR; INTRAVENOUS at 05:15

## 2019-08-13 RX ADMIN — PANTOPRAZOLE SODIUM 40 MILLIGRAM(S): 20 TABLET, DELAYED RELEASE ORAL at 11:25

## 2019-08-13 RX ADMIN — HEPARIN SODIUM 5000 UNIT(S): 5000 INJECTION INTRAVENOUS; SUBCUTANEOUS at 05:13

## 2019-08-13 RX ADMIN — CHLORHEXIDINE GLUCONATE 15 MILLILITER(S): 213 SOLUTION TOPICAL at 05:13

## 2019-08-13 RX ADMIN — ATORVASTATIN CALCIUM 80 MILLIGRAM(S): 80 TABLET, FILM COATED ORAL at 21:24

## 2019-08-13 RX ADMIN — Medication 1 PUFF(S): at 13:12

## 2019-08-13 RX ADMIN — HEPARIN SODIUM 5000 UNIT(S): 5000 INJECTION INTRAVENOUS; SUBCUTANEOUS at 21:24

## 2019-08-13 RX ADMIN — Medication 100 MILLIGRAM(S): at 11:25

## 2019-08-13 NOTE — PROGRESS NOTE ADULT - SUBJECTIVE AND OBJECTIVE BOX
Patient is a 59y old  Male who presents with a chief complaint of Chest pain (12 Aug 2019 17:50)        SUBJ:  Patient seen and examined. Remains intubated. FiO2 70% with high PEEP. Off pressor support.      MEDICATIONS  (STANDING):  ALBUTerol/ipratropium (CFC free) Inhaler. 1 Puff(s) Inhalation every 4 hours  ampicillin/sulbactam  IVPB 1.5 Gram(s) IV Intermittent every 6 hours  aspirin  chewable 81 milliGRAM(s) Enteral Tube daily  atorvastatin 80 milliGRAM(s) Oral at bedtime  chlorhexidine 0.12% Liquid 15 milliLiter(s) Oral Mucosa two times a day  chlorhexidine 4% Liquid 1 Application(s) Topical daily  docusate sodium 100 milliGRAM(s) Oral daily  fentaNYL   Infusion. 0.5 MICROgram(s)/kG/Hr (4.67 mL/Hr) IV Continuous <Continuous>  furosemide   Injectable 40 milliGRAM(s) IV Push two times a day  heparin  Injectable 5000 Unit(s) SubCutaneous every 8 hours  methylPREDNISolone sodium succinate Injectable 60 milliGRAM(s) IV Push daily  norepinephrine Infusion 0.05 MICROgram(s)/kG/Min (8.756 mL/Hr) IV Continuous <Continuous>  pantoprazole   Suspension 40 milliGRAM(s) Enteral Tube daily  propofol Infusion 10 MICROgram(s)/kG/Min (6.3 mL/Hr) IV Continuous <Continuous>  senna 1 Tablet(s) Oral at bedtime    MEDICATIONS  (PRN):  acetaminophen   Tablet .. 650 milliGRAM(s) Oral every 6 hours PRN Temp greater or equal to 38C (100.4F)  ALBUTerol/ipratropium (CFC free) Inhaler. 1 Puff(s) Inhalation every 4 hours PRN Wheezing            Vital Signs Last 24 Hrs  T(C): 37.8 (13 Aug 2019 07:00), Max: 38.3 (12 Aug 2019 20:00)  T(F): 100.1 (13 Aug 2019 07:00), Max: 100.9 (12 Aug 2019 20:00)  HR: 50 (13 Aug 2019 07:33) (46 - 68)  BP: 93/51 (13 Aug 2019 07:00) (81/44 - 119/67)  BP(mean): 69 (13 Aug 2019 07:00) (54 - 100)  RR: 21 (13 Aug 2019 07:00) (21 - 22)  SpO2: 98% (13 Aug 2019 07:33) (98% - 100%)      PHYSICAL EXAM:    GEN: intubated, sedated, responsive, follows verbal commands  HEENT: NC/AT, PERRL  Neck: No JVD, no bruits  CV: Reg, S1-S2, no murmur  Lungs: b/l vent sounds  Abd: Soft, non-tender  Ext: No edema      I&O's Summary    12 Aug 2019 07:01  -  13 Aug 2019 07:00  --------------------------------------------------------  IN: 2664 mL / OUT: 2075 mL / NET: 589 mL    13 Aug 2019 07:01  -  13 Aug 2019 08:21  --------------------------------------------------------  IN: 91 mL / OUT: 0 mL / NET: 91 mL    	        ECG:  < from: 12 Lead ECG (08.10.19 @ 20:54) >   Normal sinus rhythmwith sinus arrhythmia  Possible Inferior infarct , age undetermined  Cannot rule out Anterior infarct , age undetermined  ST & T wave abnormality, consider lateral ischemia  Abnormal ECG    < end of copied text >    TTE:  < from: Transthoracic Echocardiogram (08.11.19 @ 07:00) >  Summary:   1. Left ventricular ejection fraction, by visual estimation, is 20 to   25%.   2. Spectral Doppler shows pseudonormal pattern of left ventricular   myocardial filling (Grade II diastolic dysfunction).   3. Mitral annular calcification.   4. Sclerotic aortic valve with normal opening.   5. Estimated pulmonary artery systolic pressure is 41.7 mmHg assuming a   right atrial pressureof 8 mmHg, which is consistent with mild pulmonary   hypertension.    PHYSICIAN INTERPRETATION:  Left Ventricle: The left ventricular internal cavity size is mildly   increased. Left ventricular wall thickness is normal. Left ventricular   ejection fraction, by visual estimation, is 20 to 25%. Spectral Doppler   shows pseudonormal pattern of left ventricular myocardial filling (Grade   II diastolic dysfunction).  Right Ventricle: Normal right ventricular size and function.  Left Atrium: Mildly enlarged left atrium.  Right Atrium: Normal right atrial size.  Mitral Valve: Structurally normal mitral valve, with normal leaflet   excursion. There is mitral annular calcification.  Tricuspid Valve: Structurally normal tricuspid valve, with normal leaflet   excursion. Estimated pulmonary artery systolic pressure is 41.7 mmHg   assuming a right atrial pressure of 8 mmHg, which is consistent with mild   pulmonary hypertension.  Aortic Valve: The aortic valve was not well visualized. The aortic valve   is trileaflet. No evidence of aortic stenosis. Sclerotic aortic valve     < end of copied text >      LABS:                        12.9   12.09 )-----------( 144      ( 13 Aug 2019 04:55 )             40.4     08-13    147<H>  |  108  |  32<H>  ----------------------------<  183<H>  3.8   |  27  |  1.0    Ca    8.6      13 Aug 2019 04:55  Phos  4.3     08-12  Mg     2.1     08-12    TPro  6.3  /  Alb  3.7  /  TBili  0.4  /  DBili  x   /  AST  70<H>  /  ALT  28  /  AlkPhos  62  08-12              BNPSerum Pro-Brain Natriuretic Peptide: 640 pg/mL (08-13 @ 04:55)    RADIOLOGY & ADDITIONAL STUDIES:      IMPRESSION AND PLAN:

## 2019-08-13 NOTE — PROGRESS NOTE ADULT - SUBJECTIVE AND OBJECTIVE BOX
Patient is a 59y old  Male who presents with a chief complaint of Chest pain (13 Aug 2019 09:29)      OVERNIGHT EVENTS:  spiked fever 100.9F at midnight, still intubated on Vent, on fentanyl, no pressors     SUBJECTIVE / INTERVAL HPI: Patient seen and examined at bedside.     VITAL SIGNS:  Vital Signs Last 24 Hrs  T(C): 38.1 (13 Aug 2019 11:00), Max: 38.3 (12 Aug 2019 20:00)  T(F): 100.5 (13 Aug 2019 11:00), Max: 100.9 (12 Aug 2019 20:00)  HR: 62 (13 Aug 2019 15:00) (44 - 68)  BP: 103/58 (13 Aug 2019 14:00) (81/44 - 119/67)  BP(mean): 80 (13 Aug 2019 14:00) (54 - 100)  RR: 21 (13 Aug 2019 15:00) (21 - 22)  SpO2: 98% (13 Aug 2019 15:00) (97% - 100%)    PHYSICAL EXAM:    General: WDWN  HEENT: NC/AT; PERRL, clear conjunctiva  Neck: supple  Cardiovascular: +S1/S2; RRR  Respiratory: CTA b/l; no W/R/R  Gastrointestinal: soft, NT/ND; +BSx4  Extremities: WWP; 2+ peripheral pulses; no edema   Neurological: no focal deficits    MEDICATIONS:  MEDICATIONS  (STANDING):  ALBUTerol/ipratropium (CFC free) Inhaler. 1 Puff(s) Inhalation every 4 hours  ampicillin/sulbactam  IVPB 1.5 Gram(s) IV Intermittent every 6 hours  aspirin  chewable 81 milliGRAM(s) Enteral Tube daily  atorvastatin 80 milliGRAM(s) Oral at bedtime  chlorhexidine 0.12% Liquid 15 milliLiter(s) Oral Mucosa two times a day  chlorhexidine 4% Liquid 1 Application(s) Topical daily  docusate sodium 100 milliGRAM(s) Oral daily  fentaNYL   Infusion. 0.5 MICROgram(s)/kG/Hr (4.67 mL/Hr) IV Continuous <Continuous>  furosemide   Injectable 40 milliGRAM(s) IV Push two times a day  heparin  Injectable 5000 Unit(s) SubCutaneous every 8 hours  methylPREDNISolone sodium succinate Injectable 60 milliGRAM(s) IV Push daily  norepinephrine Infusion 0.05 MICROgram(s)/kG/Min (8.756 mL/Hr) IV Continuous <Continuous>  pantoprazole   Suspension 40 milliGRAM(s) Enteral Tube daily  propofol Infusion 10 MICROgram(s)/kG/Min (6.3 mL/Hr) IV Continuous <Continuous>  senna 1 Tablet(s) Oral at bedtime    MEDICATIONS  (PRN):  acetaminophen   Tablet .. 650 milliGRAM(s) Oral every 6 hours PRN Temp greater or equal to 38C (100.4F)  ALBUTerol/ipratropium (CFC free) Inhaler. 1 Puff(s) Inhalation every 4 hours PRN Wheezing      ALLERGIES:  Allergies    No Known Allergies    Intolerances        LABS:                        13.2   8.26  )-----------( 127      ( 13 Aug 2019 11:58 )             41.3     08-13    147<H>  |  108  |  32<H>  ----------------------------<  183<H>  3.8   |  27  |  1.0    Ca    8.6      13 Aug 2019 04:55  Phos  4.3     08-12  Mg     2.1     08-12    TPro  6.3  /  Alb  3.7  /  TBili  0.4  /  DBili  x   /  AST  70<H>  /  ALT  28  /  AlkPhos  62  08-12        CAPILLARY BLOOD GLUCOSE      POCT Blood Glucose.: 167 mg/dL (13 Aug 2019 11:38)  Troponin T, Serum: 0.16: Critical value:  Troponin T, Serum in AM (08.11.19 @ 04:29)    Troponin T, Serum: 0.13: Critical value: ng/mL  Culture - Blood in AM (08.11.19 @ 04:29)    Specimen Source: .Blood None    Culture Results:   No growth to date.  Culture - Sputum (08.11.19 @ 22:40)    Gram Stain:   Moderate polymorphonuclear leukocytes per low power field  Rare Squamous epithelial cells per low power field  No organisms seen per oil power field    Specimen Source: .Sputum Sputum    Culture Results:   No growth  < from: Transthoracic Echocardiogram (08.11.19 @ 07:00) >  Summary:   1. Left ventricular ejection fraction, by visual estimation, is 20 to   25%.   2. Spectral Doppler shows pseudonormal pattern of left ventricular   myocardial filling (Grade II diastolic dysfunction    < end of copied text >  < from: Xray Chest 1 View- PORTABLE-Routine (08.13.19 @ 05:30) >  Impression:      No consolidation effusion or pneumothorax    < end of copied text > Patient is a 59y old  Male who presents with a chief complaint of Chest pain (13 Aug 2019 09:29)      OVERNIGHT EVENTS:  spiked fever 100.9F at midnight, still intubated on Vent, on fentanyl, no pressors     SUBJECTIVE / INTERVAL HPI: Patient seen and examined at bedside.     VITAL SIGNS:  Vital Signs Last 24 Hrs  T(C): 38.1 (13 Aug 2019 11:00), Max: 38.3 (12 Aug 2019 20:00)  T(F): 100.5 (13 Aug 2019 11:00), Max: 100.9 (12 Aug 2019 20:00)  HR: 62 (13 Aug 2019 15:00) (44 - 68)  BP: 103/58 (13 Aug 2019 14:00) (81/44 - 119/67)  BP(mean): 80 (13 Aug 2019 14:00) (54 - 100)  RR: 21 (13 Aug 2019 15:00) (21 - 22)  SpO2: 98% (13 Aug 2019 15:00) (97% - 100%)    PHYSICAL EXAM:    General: WDWN  HEENT: NC/AT; PERRL, clear conjunctiva  Neck: supple  Cardiovascular: +S1/S2; RRR  Respiratory: CTA b/l; no W/R/R  Gastrointestinal: soft, NT/ND; +BSx4  Extremities: WWP; 2+ peripheral pulses; no edema   Neurological: no focal deficits    MEDICATIONS:  MEDICATIONS  (STANDING):  ALBUTerol/ipratropium (CFC free) Inhaler. 1 Puff(s) Inhalation every 4 hours  ampicillin/sulbactam  IVPB 1.5 Gram(s) IV Intermittent every 6 hours  aspirin  chewable 81 milliGRAM(s) Enteral Tube daily  atorvastatin 80 milliGRAM(s) Oral at bedtime  chlorhexidine 0.12% Liquid 15 milliLiter(s) Oral Mucosa two times a day  chlorhexidine 4% Liquid 1 Application(s) Topical daily  docusate sodium 100 milliGRAM(s) Oral daily  fentaNYL   Infusion. 0.5 MICROgram(s)/kG/Hr (4.67 mL/Hr) IV Continuous <Continuous>  furosemide   Injectable 40 milliGRAM(s) IV Push two times a day  heparin  Injectable 5000 Unit(s) SubCutaneous every 8 hours  methylPREDNISolone sodium succinate Injectable 60 milliGRAM(s) IV Push daily  norepinephrine Infusion 0.05 MICROgram(s)/kG/Min (8.756 mL/Hr) IV Continuous <Continuous>  pantoprazole   Suspension 40 milliGRAM(s) Enteral Tube daily  propofol Infusion 10 MICROgram(s)/kG/Min (6.3 mL/Hr) IV Continuous <Continuous>  senna 1 Tablet(s) Oral at bedtime    MEDICATIONS  (PRN):  acetaminophen   Tablet .. 650 milliGRAM(s) Oral every 6 hours PRN Temp greater or equal to 38C (100.4F)  ALBUTerol/ipratropium (CFC free) Inhaler. 1 Puff(s) Inhalation every 4 hours PRN Wheezing      ALLERGIES:  Allergies    No Known Allergies    Intolerances        LABS:                        13.2   8.26  )-----------( 127      ( 13 Aug 2019 11:58 )             41.3     08-13    147<H>  |  108  |  32<H>  ----------------------------<  183<H>  3.8   |  27  |  1.0    Ca    8.6      13 Aug 2019 04:55  Phos  4.3     08-12  Mg     2.1     08-12    TPro  6.3  /  Alb  3.7  /  TBili  0.4  /  DBili  x   /  AST  70<H>  /  ALT  28  /  AlkPhos  62  08-12        CAPILLARY BLOOD GLUCOSE      POCT Blood Glucose.: 167 mg/dL (13 Aug 2019 11:38)  Troponin T, Serum: 0.16: Critical value:  Troponin T, Serum in AM (08.11.19 @ 04:29)    Troponin T, Serum: 0.13: Critical value: ng/mL  Culture - Blood in AM (08.11.19 @ 04:29)    Specimen Source: .Blood None    Culture Results:   No growth to date.  Culture - Sputum (08.11.19 @ 22:40)    Gram Stain:   Moderate polymorphonuclear leukocytes per low power field  Rare Squamous epithelial cells per low power field  No organisms seen per oil power field    Specimen Source: .Sputum Sputum    Culture Results:   No growth  < from: Transthoracic Echocardiogram (08.11.19 @ 07:00) >  Summary:   1. Left ventricular ejection fraction, by visual estimation, is 20 to   25%.   2. Spectral Doppler shows pseudonormal pattern of left ventricular   myocardial filling (Grade II diastolic dysfunction    < end of copied text >  < from: Xray Chest 1 View- PORTABLE-Routine (08.13.19 @ 05:30) >  Impression:      No consolidation effusion or pneumothorax    < end of copied text >  < from: CT Angio Chest w/ IV Cont (08.11.19 @ 00:00) >  IMPRESSION:     No CTA evidence of acute pulmonary embolus.    < end of copied text >

## 2019-08-13 NOTE — PROGRESS NOTE ADULT - ASSESSMENT
IMPRESSION:    Acute respiratory failure/ CHF/ 20 TO 25%  RLL PNA  RO aspiration   ? COPD  still on high FIO2      PLAN:    CNS: SAT    HEENT: Oral care    PULMONARY:  HOB @ 45 degrees.  decrease FIO2 60%, solumedrol    CARDIOVASCULAR: Cardiology. Give aspirin. EKG, LASIX 40 IV Q 12H    GI: GI prophylaxis.  OG Feeding ,     RENAL:  Follow up lytes.  Correct as needed.    INFECTIOUS DISEASE: Follow up cultures. Continue Unasyn.    HEMATOLOGICAL:  DVT prophylaxis.    ENDOCRINE:  Follow up FS.    MUSCULOSKELETAL: Bedrest.  POOR PROGNOSIS

## 2019-08-13 NOTE — PROGRESS NOTE ADULT - ASSESSMENT
A 58 yo M (named Mr. Dimitry Frazier) w/ PMH of COPD (smoke 4 packs/day for over 30 years), CAD s/p CABG, DM and hx of illicit drug use presents to the ED unresponsive.      on vent, sedated with midazolam and propofol, no pressors     #RS failure: Acute decomp HF VS COPD VS pneumonia   -LASIX 40 IV Q 12H  -solumedrol 60 mg q8h  -albuterol/ipratropium q4h  -c/w unasyn, f/u cultures  -Monitor Is & Os      #CHF AND ACS, EF 20-25%  -c/w ASA and statin  --restart BB and ACEi when BP permits, currently off pressors   -repeat troponin and BNP    #DVT ppx  -UFH    #DM: monitor FS    #GI prophylaxis. protonix,  OG Feeding A 58 yo M (named Mr. Dimitry Frazier) w/ PMH of COPD (smoke 4 packs/day for over 30 years), CAD s/p CABG, DM and hx of illicit drug use presents to the ED unresponsive.      on vent, sedated with midazolam and propofol, no pressors  spiked fever 100.9F midnight, no thick secretion from ETT    #RS failure: Acute decomp HF VS COPD VS pneumonia, PE ruled out by CTA  -wean FiO2 to 60%  -LASIX 40 IV Q 12H  -solumedrol 60 mg q8h  -albuterol/ipratropium q4h    #pneumonia:  -c/w Unasyn   -if spike another fever send blood culture  -Monitor Is & Os      #CHF AND ACS, EF 20-25%  -Chest X-ray only mild vascualr congestion, pBNP 640, EF 20%  -c/w ASA and statin  -restart BB and ACEi when BP permits, currently off pressors   -repeat troponin stable,   -BNP AM  -cardio Will consider cath once more stable.    #DVT ppx  -UFH    #DM: monitor FS    #GI prophylaxis. protonix,  OG Feeding

## 2019-08-13 NOTE — PROGRESS NOTE ADULT - ASSESSMENT
CAD  CHF  COPD  Acute respiratory failure    C/w vent support. Wean off FiO2 and PEEP as tolerated.  O2 requirements are out of proportion to CHF - Chest X-ray only mild vascualr congestion, pBNP 640. Pulmonary will follow to assess for alternative causes of hypoxia. Consider w/u for COPD, PE, PNA.  C/w diuretic. Not on b-blocker due to hypotension.  Will need to obtain records from his cardiologist in [place] to determine prior degree of LV dysfunction, coronary anatomy. Will consider cath once more stable.  D/w primary team. CAD  CHF  COPD  Acute respiratory failure    C/w vent support. Wean off FiO2 and PEEP as tolerated.  O2 requirements are out of proportion to CHF - Chest X-ray only mild vascualr congestion, pBNP 640. Pulmonary will follow to assess for alternative causes of hypoxia. PE was ruled out by CTA. Consider w/u for COPD, aspiration, PNA.  C/w diuretic. Not on b-blocker due to hypotension.  Will need to obtain records from his cardiologist in [place] to determine prior degree of LV dysfunction, coronary anatomy. Will consider cath once more stable.  D/w primary team.

## 2019-08-13 NOTE — PROGRESS NOTE ADULT - SUBJECTIVE AND OBJECTIVE BOX
OVERNIGHT EVENTS: still intubated, ventilated, on propofol, fentanyl, low grade T    Vital Signs Last 24 Hrs  T(C): 37.8 (13 Aug 2019 07:00), Max: 38.3 (12 Aug 2019 20:00)  T(F): 100.1 (13 Aug 2019 07:00), Max: 100.9 (12 Aug 2019 20:00)  HR: 50 (13 Aug 2019 07:33) (46 - 68)  BP: 93/51 (13 Aug 2019 07:00) (81/44 - 119/67)  BP(mean): 69 (13 Aug 2019 07:00) (54 - 100)  RR: 21 (13 Aug 2019 07:00) (21 - 22)  SpO2: 98% (13 Aug 2019 07:33) (98% - 100%)    PHYSICAL EXAMINATION:    GENERAL: AWAKE, FOLLOWS SIMPLE COMMANDS    HEENT: Head is normocephalic and atraumatic. Extraocular muscles are intact. Mucous membranes are moist.    NECK: Supple.    LUNGS: DEC BS R SIDE    HEART: Regular rate and rhythm without murmur.    ABDOMEN: Soft, nontender, and nondistended.      EXTREMITIES: swelling +    NEUROLOGIC: Grossly intact.    SKIN: No ulceration or induration present.      LABS:                        12.9   12.09 )-----------( 144      ( 13 Aug 2019 04:55 )             40.4     08-13    147<H>  |  108  |  32<H>  ----------------------------<  183<H>  3.8   |  27  |  1.0    Ca    8.6      13 Aug 2019 04:55  Phos  4.3     08-12  Mg     2.1     08-12    TPro  6.3  /  Alb  3.7  /  TBili  0.4  /  DBili  x   /  AST  70<H>  /  ALT  28  /  AlkPhos  62  08-12        ABG - ( 13 Aug 2019 04:23 )  pH, Arterial: 7.42  pH, Blood: x     /  pCO2: 49    /  pO2: 101   / HCO3: 31    / Base Excess: 5.5   /  SaO2: 98        450/22/70/10              Serum Pro-Brain Natriuretic Peptide: 640 pg/mL (08-13-19 @ 04:55)  Serum Pro-Brain Natriuretic Peptide: 1050 pg/mL (08-10-19 @ 20:26)            08-12-19 @ 07:01  -  08-13-19 @ 07:00  --------------------------------------------------------  IN: 2664 mL / OUT: 2075 mL / NET: 589 mL    08-13-19 @ 07:01  -  08-13-19 @ 09:31  --------------------------------------------------------  IN: 91 mL / OUT: 0 mL / NET: 91 mL        MICROBIOLOGY:  Culture Results:   No growth to date. (08-11 @ 04:29)  Culture Results:   No growth (08-11 @ 02:06)  Culture Results:   No growth to date. (08-11 @ 01:57)      MEDICATIONS  (STANDING):  ALBUTerol/ipratropium (CFC free) Inhaler. 1 Puff(s) Inhalation every 4 hours  ampicillin/sulbactam  IVPB 1.5 Gram(s) IV Intermittent every 6 hours  aspirin  chewable 81 milliGRAM(s) Enteral Tube daily  atorvastatin 80 milliGRAM(s) Oral at bedtime  chlorhexidine 0.12% Liquid 15 milliLiter(s) Oral Mucosa two times a day  chlorhexidine 4% Liquid 1 Application(s) Topical daily  docusate sodium 100 milliGRAM(s) Oral daily  fentaNYL   Infusion. 0.5 MICROgram(s)/kG/Hr (4.67 mL/Hr) IV Continuous <Continuous>  furosemide   Injectable 40 milliGRAM(s) IV Push two times a day  heparin  Injectable 5000 Unit(s) SubCutaneous every 8 hours  methylPREDNISolone sodium succinate Injectable 60 milliGRAM(s) IV Push daily  norepinephrine Infusion 0.05 MICROgram(s)/kG/Min (8.756 mL/Hr) IV Continuous <Continuous>  pantoprazole   Suspension 40 milliGRAM(s) Enteral Tube daily  propofol Infusion 10 MICROgram(s)/kG/Min (6.3 mL/Hr) IV Continuous <Continuous>  senna 1 Tablet(s) Oral at bedtime    MEDICATIONS  (PRN):  acetaminophen   Tablet .. 650 milliGRAM(s) Oral every 6 hours PRN Temp greater or equal to 38C (100.4F)  ALBUTerol/ipratropium (CFC free) Inhaler. 1 Puff(s) Inhalation every 4 hours PRN Wheezing      RADIOLOGY & ADDITIONAL STUDIES:

## 2019-08-14 LAB
ANION GAP SERPL CALC-SCNC: 13 MMOL/L — SIGNIFICANT CHANGE UP (ref 7–14)
BASE EXCESS BLDA CALC-SCNC: 9 MMOL/L — HIGH (ref -2–2)
BASOPHILS # BLD AUTO: 0.01 K/UL — SIGNIFICANT CHANGE UP (ref 0–0.2)
BASOPHILS NFR BLD AUTO: 0.1 % — SIGNIFICANT CHANGE UP (ref 0–1)
BUN SERPL-MCNC: 36 MG/DL — HIGH (ref 10–20)
CALCIUM SERPL-MCNC: 8.5 MG/DL — SIGNIFICANT CHANGE UP (ref 8.5–10.1)
CHLORIDE SERPL-SCNC: 108 MMOL/L — SIGNIFICANT CHANGE UP (ref 98–110)
CO2 SERPL-SCNC: 27 MMOL/L — SIGNIFICANT CHANGE UP (ref 17–32)
CREAT SERPL-MCNC: 1 MG/DL — SIGNIFICANT CHANGE UP (ref 0.7–1.5)
CULTURE RESULTS: SIGNIFICANT CHANGE UP
EOSINOPHIL # BLD AUTO: 0.01 K/UL — SIGNIFICANT CHANGE UP (ref 0–0.7)
EOSINOPHIL NFR BLD AUTO: 0.1 % — SIGNIFICANT CHANGE UP (ref 0–8)
GLUCOSE BLDC GLUCOMTR-MCNC: 125 MG/DL — HIGH (ref 70–99)
GLUCOSE SERPL-MCNC: 182 MG/DL — HIGH (ref 70–99)
HCO3 BLDA-SCNC: 34 MMOL/L — HIGH (ref 23–27)
HCT VFR BLD CALC: 39.2 % — LOW (ref 42–52)
HGB BLD-MCNC: 12.2 G/DL — LOW (ref 14–18)
IMM GRANULOCYTES NFR BLD AUTO: 0.2 % — SIGNIFICANT CHANGE UP (ref 0.1–0.3)
LYMPHOCYTES # BLD AUTO: 1.16 K/UL — LOW (ref 1.2–3.4)
LYMPHOCYTES # BLD AUTO: 14.1 % — LOW (ref 20.5–51.1)
MCHC RBC-ENTMCNC: 28.8 PG — SIGNIFICANT CHANGE UP (ref 27–31)
MCHC RBC-ENTMCNC: 31.1 G/DL — LOW (ref 32–37)
MCV RBC AUTO: 92.7 FL — SIGNIFICANT CHANGE UP (ref 80–94)
MONOCYTES # BLD AUTO: 1.11 K/UL — HIGH (ref 0.1–0.6)
MONOCYTES NFR BLD AUTO: 13.5 % — HIGH (ref 1.7–9.3)
NEUTROPHILS # BLD AUTO: 5.93 K/UL — SIGNIFICANT CHANGE UP (ref 1.4–6.5)
NEUTROPHILS NFR BLD AUTO: 72 % — SIGNIFICANT CHANGE UP (ref 42.2–75.2)
NRBC # BLD: 0 /100 WBCS — SIGNIFICANT CHANGE UP (ref 0–0)
NT-PROBNP SERPL-SCNC: 485 PG/ML — HIGH (ref 0–300)
PCO2 BLDA: 46 MMHG — HIGH (ref 38–42)
PH BLDA: 7.48 — HIGH (ref 7.38–7.42)
PLATELET # BLD AUTO: 125 K/UL — LOW (ref 130–400)
PO2 BLDA: 65 MMHG — LOW (ref 78–95)
POTASSIUM SERPL-MCNC: 3.6 MMOL/L — SIGNIFICANT CHANGE UP (ref 3.5–5)
POTASSIUM SERPL-SCNC: 3.6 MMOL/L — SIGNIFICANT CHANGE UP (ref 3.5–5)
RBC # BLD: 4.23 M/UL — LOW (ref 4.7–6.1)
RBC # FLD: 14.2 % — SIGNIFICANT CHANGE UP (ref 11.5–14.5)
SAO2 % BLDA: 94 % — SIGNIFICANT CHANGE UP (ref 94–98)
SODIUM SERPL-SCNC: 148 MMOL/L — HIGH (ref 135–146)
SPECIMEN SOURCE: SIGNIFICANT CHANGE UP
TROPONIN T SERPL-MCNC: 1.01 NG/ML — CRITICAL HIGH
WBC # BLD: 8.24 K/UL — SIGNIFICANT CHANGE UP (ref 4.8–10.8)
WBC # FLD AUTO: 8.24 K/UL — SIGNIFICANT CHANGE UP (ref 4.8–10.8)

## 2019-08-14 PROCEDURE — 93010 ELECTROCARDIOGRAM REPORT: CPT

## 2019-08-14 PROCEDURE — 71045 X-RAY EXAM CHEST 1 VIEW: CPT | Mod: 26

## 2019-08-14 PROCEDURE — 99232 SBSQ HOSP IP/OBS MODERATE 35: CPT

## 2019-08-14 PROCEDURE — 93306 TTE W/DOPPLER COMPLETE: CPT | Mod: 26

## 2019-08-14 RX ORDER — FUROSEMIDE 40 MG
40 TABLET ORAL EVERY 8 HOURS
Refills: 0 | Status: DISCONTINUED | OUTPATIENT
Start: 2019-08-14 | End: 2019-08-15

## 2019-08-14 RX ORDER — CLOPIDOGREL BISULFATE 75 MG/1
75 TABLET, FILM COATED ORAL DAILY
Refills: 0 | Status: DISCONTINUED | OUTPATIENT
Start: 2019-08-14 | End: 2019-08-23

## 2019-08-14 RX ADMIN — CHLORHEXIDINE GLUCONATE 15 MILLILITER(S): 213 SOLUTION TOPICAL at 17:54

## 2019-08-14 RX ADMIN — Medication 650 MILLIGRAM(S): at 17:54

## 2019-08-14 RX ADMIN — AMPICILLIN SODIUM AND SULBACTAM SODIUM 100 GRAM(S): 250; 125 INJECTION, POWDER, FOR SUSPENSION INTRAMUSCULAR; INTRAVENOUS at 00:15

## 2019-08-14 RX ADMIN — Medication 40 MILLIGRAM(S): at 13:32

## 2019-08-14 RX ADMIN — HEPARIN SODIUM 5000 UNIT(S): 5000 INJECTION INTRAVENOUS; SUBCUTANEOUS at 21:52

## 2019-08-14 RX ADMIN — ATORVASTATIN CALCIUM 80 MILLIGRAM(S): 80 TABLET, FILM COATED ORAL at 21:52

## 2019-08-14 RX ADMIN — AMPICILLIN SODIUM AND SULBACTAM SODIUM 100 GRAM(S): 250; 125 INJECTION, POWDER, FOR SUSPENSION INTRAMUSCULAR; INTRAVENOUS at 17:57

## 2019-08-14 RX ADMIN — AMPICILLIN SODIUM AND SULBACTAM SODIUM 100 GRAM(S): 250; 125 INJECTION, POWDER, FOR SUSPENSION INTRAMUSCULAR; INTRAVENOUS at 05:24

## 2019-08-14 RX ADMIN — Medication 1 PUFF(S): at 08:19

## 2019-08-14 RX ADMIN — AMPICILLIN SODIUM AND SULBACTAM SODIUM 100 GRAM(S): 250; 125 INJECTION, POWDER, FOR SUSPENSION INTRAMUSCULAR; INTRAVENOUS at 12:19

## 2019-08-14 RX ADMIN — FENTANYL CITRATE 4.67 MICROGRAM(S)/KG/HR: 50 INJECTION INTRAVENOUS at 05:25

## 2019-08-14 RX ADMIN — Medication 650 MILLIGRAM(S): at 19:00

## 2019-08-14 RX ADMIN — CHLORHEXIDINE GLUCONATE 1 APPLICATION(S): 213 SOLUTION TOPICAL at 05:24

## 2019-08-14 RX ADMIN — Medication 81 MILLIGRAM(S): at 12:15

## 2019-08-14 RX ADMIN — AMPICILLIN SODIUM AND SULBACTAM SODIUM 100 GRAM(S): 250; 125 INJECTION, POWDER, FOR SUSPENSION INTRAMUSCULAR; INTRAVENOUS at 23:16

## 2019-08-14 RX ADMIN — CLOPIDOGREL BISULFATE 75 MILLIGRAM(S): 75 TABLET, FILM COATED ORAL at 18:01

## 2019-08-14 RX ADMIN — SENNA PLUS 1 TABLET(S): 8.6 TABLET ORAL at 21:52

## 2019-08-14 RX ADMIN — CHLORHEXIDINE GLUCONATE 15 MILLILITER(S): 213 SOLUTION TOPICAL at 05:22

## 2019-08-14 RX ADMIN — PROPOFOL 6.3 MICROGRAM(S)/KG/MIN: 10 INJECTION, EMULSION INTRAVENOUS at 05:25

## 2019-08-14 RX ADMIN — HEPARIN SODIUM 5000 UNIT(S): 5000 INJECTION INTRAVENOUS; SUBCUTANEOUS at 05:22

## 2019-08-14 RX ADMIN — Medication 60 MILLIGRAM(S): at 05:22

## 2019-08-14 RX ADMIN — Medication 40 MILLIGRAM(S): at 05:22

## 2019-08-14 RX ADMIN — HEPARIN SODIUM 5000 UNIT(S): 5000 INJECTION INTRAVENOUS; SUBCUTANEOUS at 13:31

## 2019-08-14 RX ADMIN — PANTOPRAZOLE SODIUM 40 MILLIGRAM(S): 20 TABLET, DELAYED RELEASE ORAL at 12:15

## 2019-08-14 RX ADMIN — Medication 100 MILLIGRAM(S): at 11:54

## 2019-08-14 RX ADMIN — Medication 40 MILLIGRAM(S): at 21:52

## 2019-08-14 RX ADMIN — PROPOFOL 6.3 MICROGRAM(S)/KG/MIN: 10 INJECTION, EMULSION INTRAVENOUS at 21:51

## 2019-08-14 RX ADMIN — Medication 1 PUFF(S): at 13:48

## 2019-08-14 NOTE — CDI QUERY NOTE - NSCDIOTHERTXTBX2_GEN_ALL_CORE_FT
8/10 56y Male complaining of SOB became unconscious, , pt in respiratory arrest, intubated    PMH of COPD and CAD s/p bypass , DM    Admitted for Acute Respiratory Failure, NSTEMI, Systolic CHF no sign of Exacerbation, Pneumonia    8/11 cardio > CXR appears to show mild pulmonary edema,     RX: IV Lasix    I order to capture the severity of illness, Please clarify acuity of Pulmonary Edema    >Acute Pulmonary Edema    > Chronic Pulmonary Edema    > Other, Please Specify    > Unknown/Unable to Determine

## 2019-08-14 NOTE — PROGRESS NOTE ADULT - SUBJECTIVE AND OBJECTIVE BOX
Patient is a 59y old  Male who presents with a chief complaint of Chest pain (14 Aug 2019 10:29)      SUBJ:  Patient seen and examined. Remains intubated, on high PEEP, FiO2 of 50%. Troponin elevated to 1.      MEDICATIONS  (STANDING):  ALBUTerol/ipratropium (CFC free) Inhaler. 1 Puff(s) Inhalation every 4 hours  ampicillin/sulbactam  IVPB 1.5 Gram(s) IV Intermittent every 6 hours  aspirin  chewable 81 milliGRAM(s) Enteral Tube daily  atorvastatin 80 milliGRAM(s) Oral at bedtime  chlorhexidine 0.12% Liquid 15 milliLiter(s) Oral Mucosa two times a day  chlorhexidine 4% Liquid 1 Application(s) Topical daily  docusate sodium 100 milliGRAM(s) Oral daily  fentaNYL   Infusion. 0.5 MICROgram(s)/kG/Hr (4.67 mL/Hr) IV Continuous <Continuous>  furosemide   Injectable 40 milliGRAM(s) IV Push every 8 hours  heparin  Injectable 5000 Unit(s) SubCutaneous every 8 hours  norepinephrine Infusion 0.05 MICROgram(s)/kG/Min (8.756 mL/Hr) IV Continuous <Continuous>  pantoprazole   Suspension 40 milliGRAM(s) Enteral Tube daily  propofol Infusion 10 MICROgram(s)/kG/Min (6.3 mL/Hr) IV Continuous <Continuous>  senna 1 Tablet(s) Oral at bedtime    MEDICATIONS  (PRN):  acetaminophen   Tablet .. 650 milliGRAM(s) Oral every 6 hours PRN Temp greater or equal to 38C (100.4F)  ALBUTerol/ipratropium (CFC free) Inhaler. 1 Puff(s) Inhalation every 4 hours PRN Wheezing            Vital Signs Last 24 Hrs  T(C): 37.1 (14 Aug 2019 12:00), Max: 37.3 (13 Aug 2019 15:54)  T(F): 98.8 (14 Aug 2019 12:00), Max: 99.2 (13 Aug 2019 15:54)  HR: 50 (14 Aug 2019 14:00) (46 - 64)  BP: 134/63 (14 Aug 2019 14:00) (88/54 - 148/59)  BP(mean): 91 (14 Aug 2019 14:00) (65 - 92)  RR: 22 (14 Aug 2019 14:00) (21 - 22)  SpO2: 97% (14 Aug 2019 14:00) (90% - 99%)      PHYSICAL EXAM:    GEN: intubated, responsive  HEENT: NC/AT  Neck: No JVD  CV: Reg, S1-S2, no murmur  Lungs: b/l breath sounds  Abd: Soft, non-tender  Ext: No edema      I&O's Summary    13 Aug 2019 07:01  -  14 Aug 2019 07:00  --------------------------------------------------------  IN: 2421 mL / OUT: 2365 mL / NET: 56 mL    14 Aug 2019 07:01  -  14 Aug 2019 14:08  --------------------------------------------------------  IN: 480 mL / OUT: 650 mL / NET: -170 mL    	        LABS:                        12.2   8.24  )-----------( 125      ( 14 Aug 2019 04:30 )             39.2     08-14    148<H>  |  108  |  36<H>  ----------------------------<  182<H>  3.6   |  27  |  1.0    Ca    8.5      14 Aug 2019 04:30      CARDIAC MARKERS ( 14 Aug 2019 04:30 )  x     / 1.01 ng/mL / x     / x     / x                BNPSerum Pro-Brain Natriuretic Peptide: 485 pg/mL (08-14 @ 04:30)    RADIOLOGY & ADDITIONAL STUDIES:      IMPRESSION AND PLAN:

## 2019-08-14 NOTE — PROGRESS NOTE ADULT - ASSESSMENT
A 60 yo M (named Mr. Dimitry Frazier) w/ PMH of COPD (smoke 4 packs/day for over 30 years), CAD s/p CABG, DM and hx of illicit drug use presents to the ED unresponsive.      no fever, on vent, no pressors     #RS failure: pneumonia VS aspiration pneumonitis VS COPD VS Acute decomp HF   -LASIX 40 IV Q8H  -solumedrol 60 mg q8h  -albuterol/ipratropium q4h  -c/w unasyn, f/u cultures  -trend troponin  -Monitor Is & Os      #CHF AND ACS, EF 20-25%  -c/w ASA and statin  -restart BB and ACEi when BP permits, currently off pressors   -repeat troponin  -cardio Will consider cath once more stable.    #DVT ppx  -UFH    #DM: monitor FS    #GI prophylaxis. protonix,  OG Feeding

## 2019-08-14 NOTE — PROGRESS NOTE ADULT - ASSESSMENT
Respiratory failure.  CAD, s/p CABG  Systolic CHF  NSTEMI      No evidence of clinical CHF exacerbation.  Unclear etiology of high FiO2 requirements  Consider bronchoscopy, given CT chest findings  Would decrease Lasix to 40 QD.  Add Plavix, c/w ASA, statin  Antibiotics for aspiration PNA  Obtain records from outpatient cardiology in [place] for prior EF, coronary anatomy.  Repeat CE (troponin and CKMB)

## 2019-08-14 NOTE — CHART NOTE - NSCHARTNOTEFT_GEN_A_CORE
Registered Dietitian Follow-Up     Patient Profile Reviewed                           Yes [x]   No []     Nutrition History Previously Obtained        Yes [x]  No []       Pertinent Subjective Information:      Pertinent Medical Interventions:  Remains intubated. Not on pressors.      Diet order: Osmolite 1.5 @60ml/h via OGT     Anthropometrics:  - Ht. 167.6cm  - Wt. 92.2kg on 8/14 vs.   - %wt change  - BMI 33.2  - IBW     Pertinent Lab Data: (8/14) RBC 4.23, Hg 12.2, Hct 39.2, Na 148, BUN 36, glu 182     Pertinent Meds: Heparin, Lasix, Colace, Senna, Atorvastatin, Propofol @     Physical Findings:  - Appearance: intubated, off sedation  - GI function: no symptoms noted, no BM doc  - Tubes: OGT  - Oral/Mouth cavity: NPO  - Skin: intact (BS 16)      Nutrition Requirements (from RD note on 8/11)  Weight Used: 65     Estimated Energy Needs    Continue []  Adjust [] 1027-1308kcal/day (11-14kcal/kg of admit weight) vs 1430-1625kcal/day (22-25kcal/kg of IBW-65kg)  Adjusted Energy Recommendations:   kcal/day        Estimated Protein Needs    Continue [x]  Adjust [] 78-130grams/day (1.2-2grams/kg of IBW-65kg) -Due to intubation  Adjusted Protein Recommendations:   gm/day        Estimated Fluid Needs        Continue []  Adjust []  Adjusted Fluid Recommendations:   mL/day     Nutrient Intake: 2160kcal, 89g protein, 1094ml free H2O        [] Previous Nutrition Diagnosis: Inadequate Enteral Nutrition Infusion.            [x] Ongoing          [] Resolved       Nutrition Intervention: enteral and parenteral nutrition    Rec: Change TF to Vital HP @     Goal/Expected Outcome:     Indicator/Monitoring: energy intake, body composition, NFPF, electrolyte profile, glucose profile, micronutrient profile Registered Dietitian Follow-Up     Patient Profile Reviewed                           Yes [x]   No []     Nutrition History Previously Obtained        Yes [x]  No []       Pertinent Subjective Information: Pt. remains intubated, on Propofol, spoke to RN at bedside.      Pertinent Medical Interventions:  Remains intubated. Not on pressors.      Diet order: Osmolite 1.5 @60ml/h via OGT     Anthropometrics:  - Ht. 167.6cm  - Wt. 92.2kg on 8/14 vs. 93.4kg on admission, will continue to monitor wt trends   - %wt change  - BMI 33.2  - IBW 65kg     Pertinent Lab Data: (8/14) RBC 4.23, Hg 12.2, Hct 39.2, Na 148, BUN 36, glu 182     Pertinent Meds: Heparin, Lasix, Colace, Senna, Atorvastatin, Propofol @20ml/h (528kcal)      Physical Findings:  - Appearance: intubated, off sedation  - GI function: no symptoms noted, no BM doc since admit  - Tubes: OGT  - Oral/Mouth cavity: NPO  - Skin: intact (BS 16)      Nutrition Requirements (from RD note on 8/11)  Weight Used: 92.2kg lowest doc weight, ideal 65kg     Estimated Energy Needs    Continue []  Adjust [] ~1224-1449kcal   1228-1433kcal (60-70% PSE 2003b) Tmax 38.1, Vmax 9 1014-1291kcal/day (11-14kcal/kg of admit weight) vs 1430-1625kcal/day (22-25kcal/kg of IBW-65kg)  Adjusted Energy Recommendations:   kcal/day        Estimated Protein Needs    Continue [x]  Adjust [] 78-130grams/day (1.2-2grams/kg of IBW-65kg) -Due to intubation  Adjusted Protein Recommendations:   gm/day        Estimated Fluid Needs        Continue [x]  Adjust [] per CCU team   Adjusted Fluid Recommendations:   mL/day     Nutrient Intake: 2160kcal, 89g protein, 1094ml free H2O (175% est calorie needs, 100% est protein needs)         [] Previous Nutrition Diagnosis: Inadequate Enteral Nutrition Infusion.            [x] Ongoing          [] Resolved       Nutrition Intervention: enteral and parenteral nutrition    Rec: Change TF to Vital HP @35ml/h with Prosource TF BID for 920kcal, 94g protein, 706ml free H2O. TF + current Propofol rate provide 1448kcal. Consider more aggressive bowel regimen.      Goal/Expected Outcome: In 3 days TF to provide >85% est energy needs, but not exceed 105%, at least 1Bm in 3 days.      Indicator/Monitoring: energy intake, body composition, NFPF, electrolyte profile, glucose profile, micronutrient profile

## 2019-08-14 NOTE — PROGRESS NOTE ADULT - SUBJECTIVE AND OBJECTIVE BOX
Patient is a 59y old  Male who presents with a chief complaint of Chest pain (13 Aug 2019 15:56)        Over Night Events: No events overnight. Remains intubated. Not on pressors.        ROS:  See HPI    PHYSICAL EXAM    ICU Vital Signs Last 24 Hrs  T(C): 37.1 (14 Aug 2019 08:00), Max: 38.1 (13 Aug 2019 11:00)  T(F): 98.8 (14 Aug 2019 08:00), Max: 100.5 (13 Aug 2019 11:00)  HR: 54 (14 Aug 2019 08:00) (46 - 62)  BP: 99/66 (14 Aug 2019 08:00) (88/54 - 114/60)  BP(mean): 78 (14 Aug 2019 08:00) (65 - 85)  RR: 21 (14 Aug 2019 08:00) (21 - 22)  SpO2: 96% (14 Aug 2019 08:00) (90% - 99%)      General: NAD  HEENT: + ETT, + OGT             Lymph Nodes: No cervical LN   Lungs: Bilateral BS anteriorly  Cardiovascular: Regular   Abdomen: Soft, Positive BS  Extremities: No clubbing, No LE edema  Skin: Warm  Neurological: Sedated, follows commands off sedation      08-13-19 @ 07:01  -  08-14-19 @ 07:00  --------------------------------------------------------  IN:    Enteral Tube Flush: 60 mL    fentaNYL Infusion.: 594 mL    IV PiggyBack: 150 mL    Osmolite: 1320 mL    propofol Infusion: 297 mL  Total IN: 2421 mL    OUT:    Indwelling Catheter - Urethral: 2365 mL  Total OUT: 2365 mL    Total NET: 56 mL          LABS:                            12.2   8.24  )-----------( 125      ( 14 Aug 2019 04:30 )             39.2                                               08-14    148<H>  |  108  |  36<H>  ----------------------------<  182<H>  3.6   |  27  |  1.0    Ca    8.5      14 Aug 2019 04:30                                                   CARDIAC MARKERS ( 14 Aug 2019 04:30 )  x     / 1.01 ng/mL / x     / x     / x                                                                                         Culture - Sputum (collected 11 Aug 2019 22:40)  Source: .Sputum Sputum  Gram Stain (12 Aug 2019 07:41):    Moderate polymorphonuclear leukocytes per low power field    Rare Squamous epithelial cells per low power field    No organisms seen per oil power field  Final Report (14 Aug 2019 08:01):    No growth at 48 hours                                                   Mode: AC/ CMV (Assist Control/ Continuous Mandatory Ventilation)  RR (machine): 22  TV (machine): 450  FiO2: 50  PEEP: 10  ITime: 1  MAP: 17  PIP: 33  ABG - ( 14 Aug 2019 02:13 )  pH, Arterial: 7.48  pH, Blood: x     /  pCO2: 46    /  pO2: 65    / HCO3: 34    / Base Excess: 9.0   /  SaO2: 94                  MEDICATIONS  (STANDING):  ALBUTerol/ipratropium (CFC free) Inhaler. 1 Puff(s) Inhalation every 4 hours  ampicillin/sulbactam  IVPB 1.5 Gram(s) IV Intermittent every 6 hours  aspirin  chewable 81 milliGRAM(s) Enteral Tube daily  atorvastatin 80 milliGRAM(s) Oral at bedtime  chlorhexidine 0.12% Liquid 15 milliLiter(s) Oral Mucosa two times a day  chlorhexidine 4% Liquid 1 Application(s) Topical daily  docusate sodium 100 milliGRAM(s) Oral daily  fentaNYL   Infusion. 0.5 MICROgram(s)/kG/Hr (4.67 mL/Hr) IV Continuous <Continuous>  furosemide   Injectable 40 milliGRAM(s) IV Push every 8 hours  heparin  Injectable 5000 Unit(s) SubCutaneous every 8 hours  methylPREDNISolone sodium succinate Injectable 60 milliGRAM(s) IV Push daily  norepinephrine Infusion 0.05 MICROgram(s)/kG/Min (8.756 mL/Hr) IV Continuous <Continuous>  pantoprazole   Suspension 40 milliGRAM(s) Enteral Tube daily  propofol Infusion 10 MICROgram(s)/kG/Min (6.3 mL/Hr) IV Continuous <Continuous>  senna 1 Tablet(s) Oral at bedtime    MEDICATIONS  (PRN):  acetaminophen   Tablet .. 650 milliGRAM(s) Oral every 6 hours PRN Temp greater or equal to 38C (100.4F)  ALBUTerol/ipratropium (CFC free) Inhaler. 1 Puff(s) Inhalation every 4 hours PRN Wheezing      Xrays:     Bilateral interstitial infiltrates,  ETT OK, OGT OK                                                                                ECHO

## 2019-08-14 NOTE — CDI QUERY NOTE - NSCDIOTHERTXTBX_GEN_ALL_CORE_HH
8/10 56y Male complaining of SOB became unconscious, pt in respiratory arrest, intubated    PMH of COPD and CAD s/p bypass , DM    Admitted for  Acute Respiratory Failure, RLL Pneumonia, NSTEMI     RX : Unasyn     In order to capture the severity of illness and risk of mortality, Please specify type of Pneumonia reflective of above antibiotic treatment.    > Possible Aspiration Pneumonia  > Possible GM Neg Pneumonia  > Possible Gm Positive  Pneumonia  > Other Type of Bacterial Pneumonia  > Unknown/Unable to Determine

## 2019-08-14 NOTE — PROGRESS NOTE ADULT - SUBJECTIVE AND OBJECTIVE BOX
Patient is a 59y old  Male who presents with a chief complaint of Chest pain (14 Aug 2019 14:08)      OVERNIGHT EVENTS: intubated. Not on pressors    SUBJECTIVE / INTERVAL HPI: Patient seen and examined at bedside.     VITAL SIGNS:  Vital Signs Last 24 Hrs  T(C): 37.8 (14 Aug 2019 16:00), Max: 37.8 (14 Aug 2019 16:00)  T(F): 100.1 (14 Aug 2019 16:00), Max: 100.1 (14 Aug 2019 16:00)  HR: 46 (14 Aug 2019 16:00) (46 - 64)  BP: 117/65 (14 Aug 2019 16:00) (88/54 - 148/59)  BP(mean): 88 (14 Aug 2019 16:00) (65 - 92)  RR: 21 (14 Aug 2019 16:00) (21 - 22)  SpO2: 97% (14 Aug 2019 16:00) (90% - 99%)    PHYSICAL EXAM:    General: WDWN  HEENT: NC/AT; PERRL, clear conjunctiva  Neck: supple  Cardiovascular: +S1/S2; RRR  Respiratory: CTA b/l; no W/R/R  Gastrointestinal: soft, NT/ND; +BSx4  Extremities: WWP; 2+ peripheral pulses; no edema   Neurological: no focal deficits    MEDICATIONS:  MEDICATIONS  (STANDING):  ALBUTerol/ipratropium (CFC free) Inhaler. 1 Puff(s) Inhalation every 4 hours  ampicillin/sulbactam  IVPB 1.5 Gram(s) IV Intermittent every 6 hours  aspirin  chewable 81 milliGRAM(s) Enteral Tube daily  atorvastatin 80 milliGRAM(s) Oral at bedtime  chlorhexidine 0.12% Liquid 15 milliLiter(s) Oral Mucosa two times a day  chlorhexidine 4% Liquid 1 Application(s) Topical daily  clopidogrel Tablet 75 milliGRAM(s) Oral daily  docusate sodium 100 milliGRAM(s) Oral daily  fentaNYL   Infusion. 0.5 MICROgram(s)/kG/Hr (4.67 mL/Hr) IV Continuous <Continuous>  furosemide   Injectable 40 milliGRAM(s) IV Push every 8 hours  heparin  Injectable 5000 Unit(s) SubCutaneous every 8 hours  norepinephrine Infusion 0.05 MICROgram(s)/kG/Min (8.756 mL/Hr) IV Continuous <Continuous>  pantoprazole   Suspension 40 milliGRAM(s) Enteral Tube daily  propofol Infusion 10 MICROgram(s)/kG/Min (6.3 mL/Hr) IV Continuous <Continuous>  senna 1 Tablet(s) Oral at bedtime    MEDICATIONS  (PRN):  acetaminophen   Tablet .. 650 milliGRAM(s) Oral every 6 hours PRN Temp greater or equal to 38C (100.4F)  ALBUTerol/ipratropium (CFC free) Inhaler. 1 Puff(s) Inhalation every 4 hours PRN Wheezing      ALLERGIES:  Allergies    No Known Allergies    Intolerances        LABS:                        12.2   8.24  )-----------( 125      ( 14 Aug 2019 04:30 )             39.2     08-14    148<H>  |  108  |  36<H>  ----------------------------<  182<H>  3.6   |  27  |  1.0    Ca    8.5      14 Aug 2019 04:30    Troponin T, Serum: 1.01: Critical value: ng/mL (08.14.19 @ 04:30)  Troponin T, Serum: 0.13: Critical value: ng/mL (08.11.19 @ 04:29)  < from: Transthoracic Echocardiogram (08.14.19 @ 14:08) >  Summary:   1. Left ventricular ejection fraction, by visual estimation,is 20 to   25%.   2. Spectral Doppler shows pseudonormal pattern of left ventricular   myocardial filling (Grade II diastolic dysfunction).   3. Mitral annular calcification.    < end of copied text >  Gram Stain:   Moderate polymorphonuclear leukocytes per low power field  Rare Squamous epithelial cells per low power field  No organisms seen per oil power field (08.11.19 @ 22:40)  Culture - Blood in AM (08.11.19 @ 04:29)    Specimen Source: .Blood None    Culture Results:   No growth to date.    < from: Xray Chest 1 View- PORTABLE-Routine (08.14.19 @ 05:33) >    IMPRESSION:     Apparent increased right lung opacification.    < end of copied text >      < from: CT Angio Chest w/ IV Cont (08.11.19 @ 00:00) >  IMPRESSION:     No CTA evidence of acute pulmonary embolus.    Reflux of intravenous contrast into the hepatic veins. This can be seen   with right heart dysfunction.    Atelectasis of the right lower lobe. Debris within right mainstem   bronchus.    < end of copied text >

## 2019-08-14 NOTE — PROGRESS NOTE ADULT - ASSESSMENT
IMPRESSION:    Acute respiratory failure/ CHF/ 20 TO 25%  RLL PNA  RO aspiration   ? COPD  still on high FIO2      PLAN:    CNS: SAT daily    HEENT: Oral care    PULMONARY:  HOB @ 45 degrees.    CARDIOVASCULAR: Cardiology. Continue ASA, LASIX 40 IV Q 8H    GI: GI prophylaxis.  OG Feeding ,     RENAL:  Follow up lytes.  Correct as needed.    INFECTIOUS DISEASE: Follow up cultures. Continue Unasyn.    HEMATOLOGICAL:  DVT prophylaxis.    ENDOCRINE:  Follow up FS.    MUSCULOSKELETAL: Bedrest.  POOR PROGNOSIS IMPRESSION:    Acute respiratory failure/ CHF/ 20 TO 25%  RLL PNA  RO aspiration   ? COPD  still on high FIO2      PLAN:    CNS: SAT daily    HEENT: Oral care    PULMONARY:  HOB @ 45 degrees.  repeat ABG, Neb as needed, steroids    CARDIOVASCULAR: Cardiology f/up. Continue ASA, neg balance    GI: GI prophylaxis.  OG Feeding ,     RENAL:  Follow up lytes.  Correct as needed.    INFECTIOUS DISEASE: Follow up cultures. Continue Unasyn.    HEMATOLOGICAL:  DVT prophylaxis.    ENDOCRINE:  Follow up FS.    MUSCULOSKELETAL: Bedrest.  POOR PROGNOSIS

## 2019-08-15 LAB
ANION GAP SERPL CALC-SCNC: 16 MMOL/L — HIGH (ref 7–14)
BASE EXCESS BLDA CALC-SCNC: 10 MMOL/L — HIGH (ref -2–2)
BASE EXCESS BLDA CALC-SCNC: 9.7 MMOL/L — HIGH (ref -2–2)
BASOPHILS # BLD AUTO: 0.01 K/UL — SIGNIFICANT CHANGE UP (ref 0–0.2)
BASOPHILS NFR BLD AUTO: 0.1 % — SIGNIFICANT CHANGE UP (ref 0–1)
BUN SERPL-MCNC: 44 MG/DL — HIGH (ref 10–20)
CALCIUM SERPL-MCNC: 9 MG/DL — SIGNIFICANT CHANGE UP (ref 8.5–10.1)
CHLORIDE SERPL-SCNC: 104 MMOL/L — SIGNIFICANT CHANGE UP (ref 98–110)
CK MB CFR SERPL CALC: 2 NG/ML — SIGNIFICANT CHANGE UP (ref 0.6–6.3)
CO2 SERPL-SCNC: 27 MMOL/L — SIGNIFICANT CHANGE UP (ref 17–32)
CREAT SERPL-MCNC: 1 MG/DL — SIGNIFICANT CHANGE UP (ref 0.7–1.5)
EOSINOPHIL # BLD AUTO: 0.04 K/UL — SIGNIFICANT CHANGE UP (ref 0–0.7)
EOSINOPHIL NFR BLD AUTO: 0.4 % — SIGNIFICANT CHANGE UP (ref 0–8)
GAS PNL BLDA: SIGNIFICANT CHANGE UP
GLUCOSE BLDC GLUCOMTR-MCNC: 105 MG/DL — HIGH (ref 70–99)
GLUCOSE BLDC GLUCOMTR-MCNC: 108 MG/DL — HIGH (ref 70–99)
GLUCOSE SERPL-MCNC: 110 MG/DL — HIGH (ref 70–99)
HCO3 BLDA-SCNC: 34 MMOL/L — HIGH (ref 23–27)
HCO3 BLDA-SCNC: 36 MMOL/L — HIGH (ref 23–27)
HCT VFR BLD CALC: 42 % — SIGNIFICANT CHANGE UP (ref 42–52)
HGB BLD-MCNC: 13.6 G/DL — LOW (ref 14–18)
HOROWITZ INDEX BLDA+IHG-RTO: 50 — SIGNIFICANT CHANGE UP
IMM GRANULOCYTES NFR BLD AUTO: 0.4 % — HIGH (ref 0.1–0.3)
LYMPHOCYTES # BLD AUTO: 1.36 K/UL — SIGNIFICANT CHANGE UP (ref 1.2–3.4)
LYMPHOCYTES # BLD AUTO: 13.8 % — LOW (ref 20.5–51.1)
MCHC RBC-ENTMCNC: 29.7 PG — SIGNIFICANT CHANGE UP (ref 27–31)
MCHC RBC-ENTMCNC: 32.4 G/DL — SIGNIFICANT CHANGE UP (ref 32–37)
MCV RBC AUTO: 91.7 FL — SIGNIFICANT CHANGE UP (ref 80–94)
MONOCYTES # BLD AUTO: 1.06 K/UL — HIGH (ref 0.1–0.6)
MONOCYTES NFR BLD AUTO: 10.7 % — HIGH (ref 1.7–9.3)
NEUTROPHILS # BLD AUTO: 7.37 K/UL — HIGH (ref 1.4–6.5)
NEUTROPHILS NFR BLD AUTO: 74.6 % — SIGNIFICANT CHANGE UP (ref 42.2–75.2)
NRBC # BLD: 0 /100 WBCS — SIGNIFICANT CHANGE UP (ref 0–0)
PCO2 BLDA: 44 MMHG — HIGH (ref 38–42)
PCO2 BLDA: 52 MMHG — HIGH (ref 38–42)
PH BLDA: 7.45 — HIGH (ref 7.38–7.42)
PH BLDA: 7.5 — HIGH (ref 7.38–7.42)
PLATELET # BLD AUTO: 114 K/UL — LOW (ref 130–400)
PO2 BLDA: 84 MMHG — SIGNIFICANT CHANGE UP (ref 78–95)
PO2 BLDA: 93 MMHG — SIGNIFICANT CHANGE UP (ref 78–95)
POTASSIUM SERPL-MCNC: 4 MMOL/L — SIGNIFICANT CHANGE UP (ref 3.5–5)
POTASSIUM SERPL-SCNC: 4 MMOL/L — SIGNIFICANT CHANGE UP (ref 3.5–5)
RBC # BLD: 4.58 M/UL — LOW (ref 4.7–6.1)
RBC # FLD: 14.2 % — SIGNIFICANT CHANGE UP (ref 11.5–14.5)
SAO2 % BLDA: 97 % — SIGNIFICANT CHANGE UP (ref 94–98)
SAO2 % BLDA: 97 % — SIGNIFICANT CHANGE UP (ref 94–98)
SODIUM SERPL-SCNC: 147 MMOL/L — HIGH (ref 135–146)
TROPONIN T SERPL-MCNC: 1.33 NG/ML — CRITICAL HIGH
WBC # BLD: 9.88 K/UL — SIGNIFICANT CHANGE UP (ref 4.8–10.8)
WBC # FLD AUTO: 9.88 K/UL — SIGNIFICANT CHANGE UP (ref 4.8–10.8)

## 2019-08-15 PROCEDURE — 71045 X-RAY EXAM CHEST 1 VIEW: CPT | Mod: 26

## 2019-08-15 PROCEDURE — 99232 SBSQ HOSP IP/OBS MODERATE 35: CPT

## 2019-08-15 PROCEDURE — 93010 ELECTROCARDIOGRAM REPORT: CPT

## 2019-08-15 RX ORDER — FUROSEMIDE 40 MG
60 TABLET ORAL EVERY 12 HOURS
Refills: 0 | Status: DISCONTINUED | OUTPATIENT
Start: 2019-08-15 | End: 2019-08-16

## 2019-08-15 RX ADMIN — CLOPIDOGREL BISULFATE 75 MILLIGRAM(S): 75 TABLET, FILM COATED ORAL at 12:03

## 2019-08-15 RX ADMIN — HEPARIN SODIUM 5000 UNIT(S): 5000 INJECTION INTRAVENOUS; SUBCUTANEOUS at 05:37

## 2019-08-15 RX ADMIN — Medication 60 MILLIGRAM(S): at 17:54

## 2019-08-15 RX ADMIN — Medication 81 MILLIGRAM(S): at 12:03

## 2019-08-15 RX ADMIN — CHLORHEXIDINE GLUCONATE 15 MILLILITER(S): 213 SOLUTION TOPICAL at 17:54

## 2019-08-15 RX ADMIN — AMPICILLIN SODIUM AND SULBACTAM SODIUM 100 GRAM(S): 250; 125 INJECTION, POWDER, FOR SUSPENSION INTRAMUSCULAR; INTRAVENOUS at 05:36

## 2019-08-15 RX ADMIN — Medication 100 MILLIGRAM(S): at 12:03

## 2019-08-15 RX ADMIN — ATORVASTATIN CALCIUM 80 MILLIGRAM(S): 80 TABLET, FILM COATED ORAL at 21:19

## 2019-08-15 RX ADMIN — SENNA PLUS 1 TABLET(S): 8.6 TABLET ORAL at 21:19

## 2019-08-15 RX ADMIN — AMPICILLIN SODIUM AND SULBACTAM SODIUM 100 GRAM(S): 250; 125 INJECTION, POWDER, FOR SUSPENSION INTRAMUSCULAR; INTRAVENOUS at 17:53

## 2019-08-15 RX ADMIN — Medication 40 MILLIGRAM(S): at 05:36

## 2019-08-15 RX ADMIN — AMPICILLIN SODIUM AND SULBACTAM SODIUM 100 GRAM(S): 250; 125 INJECTION, POWDER, FOR SUSPENSION INTRAMUSCULAR; INTRAVENOUS at 12:05

## 2019-08-15 RX ADMIN — HEPARIN SODIUM 5000 UNIT(S): 5000 INJECTION INTRAVENOUS; SUBCUTANEOUS at 14:38

## 2019-08-15 RX ADMIN — AMPICILLIN SODIUM AND SULBACTAM SODIUM 100 GRAM(S): 250; 125 INJECTION, POWDER, FOR SUSPENSION INTRAMUSCULAR; INTRAVENOUS at 23:17

## 2019-08-15 RX ADMIN — CHLORHEXIDINE GLUCONATE 1 APPLICATION(S): 213 SOLUTION TOPICAL at 05:36

## 2019-08-15 RX ADMIN — HEPARIN SODIUM 5000 UNIT(S): 5000 INJECTION INTRAVENOUS; SUBCUTANEOUS at 21:19

## 2019-08-15 RX ADMIN — PROPOFOL 6.3 MICROGRAM(S)/KG/MIN: 10 INJECTION, EMULSION INTRAVENOUS at 00:32

## 2019-08-15 RX ADMIN — FENTANYL CITRATE 4.67 MICROGRAM(S)/KG/HR: 50 INJECTION INTRAVENOUS at 00:31

## 2019-08-15 RX ADMIN — Medication 1 PUFF(S): at 11:55

## 2019-08-15 RX ADMIN — PANTOPRAZOLE SODIUM 40 MILLIGRAM(S): 20 TABLET, DELAYED RELEASE ORAL at 12:03

## 2019-08-15 RX ADMIN — CHLORHEXIDINE GLUCONATE 15 MILLILITER(S): 213 SOLUTION TOPICAL at 05:36

## 2019-08-15 RX ADMIN — PROPOFOL 6.3 MICROGRAM(S)/KG/MIN: 10 INJECTION, EMULSION INTRAVENOUS at 05:36

## 2019-08-15 RX ADMIN — Medication 1 PUFF(S): at 09:32

## 2019-08-15 NOTE — PROGRESS NOTE ADULT - SUBJECTIVE AND OBJECTIVE BOX
Patient is a 59y old  Male who presents with a chief complaint of Chest pain (14 Aug 2019 16:47)        Over Night Events: No events. Remains intubated, sedated.        ROS:  See HPI    PHYSICAL EXAM    ICU Vital Signs Last 24 Hrs  T(C): 37.7 (15 Aug 2019 00:00), Max: 37.8 (14 Aug 2019 16:00)  T(F): 99.9 (15 Aug 2019 00:00), Max: 100.1 (14 Aug 2019 16:00)  HR: 50 (15 Aug 2019 07:36) (44 - 64)  BP: 95/55 (15 Aug 2019 07:36) (87/55 - 149/62)  BP(mean): 67 (15 Aug 2019 07:36) (67 - 93)  RR: 21 (15 Aug 2019 07:36) (21 - 22)  SpO2: 96% (15 Aug 2019 07:36) (95% - 98%)      General:  HEENT: JOHANA             Lymph Nodes: No cervical LN   Lungs: Bilateral BS  Cardiovascular: Regular   Abdomen: Soft, Positive BS  Extremities: No clubbing   Skin: Warm  Neurological: Non focal       08-14-19 @ 07:01  -  08-15-19 @ 07:00  --------------------------------------------------------  IN:    Enteral Tube Flush: 310 mL    fentaNYL Infusion.: 535 mL    IV PiggyBack: 100 mL    ns in tub fed vital1: 665 mL    Osmolite: 300 mL    propofol Infusion: 394 mL  Total IN: 2304 mL    OUT:    Indwelling Catheter - Urethral: 1920 mL  Total OUT: 1920 mL    Total NET: 384 mL          LABS:                            13.6   9.88  )-----------( 114      ( 15 Aug 2019 04:34 )             42.0                                               08-15    147<H>  |  104  |  44<H>  ----------------------------<  110<H>  4.0   |  27  |  1.0    Ca    9.0      15 Aug 2019 04:34                                                   CARDIAC MARKERS ( 15 Aug 2019 04:34 )  x     / 1.33 ng/mL / x     / x     / 2.0 ng/mL  CARDIAC MARKERS ( 14 Aug 2019 04:30 )  x     / 1.01 ng/mL / x     / x     / x                                                                                         Culture - Blood (collected 13 Aug 2019 11:58)  Source: .Blood None  Preliminary Report (14 Aug 2019 23:00):    No growth to date.                                                   Mode: AC/ CMV (Assist Control/ Continuous Mandatory Ventilation)  RR (machine): 22  TV (machine): 450  FiO2: 50  PEEP: 10  ITime: 1  MAP: 19  PIP: 37  ABG - ( 14 Aug 2019 12:53 )  pH, Arterial: 7.48  pH, Blood: x     /  pCO2: 44    /  pO2: 80    / HCO3: 32    / Base Excess: 8.0   /  SaO2: 96                  MEDICATIONS  (STANDING):  ALBUTerol/ipratropium (CFC free) Inhaler. 1 Puff(s) Inhalation every 4 hours  ampicillin/sulbactam  IVPB 1.5 Gram(s) IV Intermittent every 6 hours  aspirin  chewable 81 milliGRAM(s) Enteral Tube daily  atorvastatin 80 milliGRAM(s) Oral at bedtime  chlorhexidine 0.12% Liquid 15 milliLiter(s) Oral Mucosa two times a day  chlorhexidine 4% Liquid 1 Application(s) Topical daily  clopidogrel Tablet 75 milliGRAM(s) Oral daily  docusate sodium 100 milliGRAM(s) Oral daily  fentaNYL   Infusion. 0.5 MICROgram(s)/kG/Hr (4.67 mL/Hr) IV Continuous <Continuous>  furosemide   Injectable 40 milliGRAM(s) IV Push every 8 hours  heparin  Injectable 5000 Unit(s) SubCutaneous every 8 hours  pantoprazole   Suspension 40 milliGRAM(s) Enteral Tube daily  propofol Infusion 10 MICROgram(s)/kG/Min (6.3 mL/Hr) IV Continuous <Continuous>  senna 1 Tablet(s) Oral at bedtime    MEDICATIONS  (PRN):  acetaminophen   Tablet .. 650 milliGRAM(s) Oral every 6 hours PRN Temp greater or equal to 38C (100.4F)  ALBUTerol/ipratropium (CFC free) Inhaler. 1 Puff(s) Inhalation every 4 hours PRN Wheezing      Xrays:  Bilateral opacities, improving. ETT OK, OGT OK                                                                                   ECHO Patient is a 59y old  Male who presents with a chief complaint of Chest pain (14 Aug 2019 16:47)        Over Night Events: No events. Remains intubated, sedated.         ROS:  See HPI    PHYSICAL EXAM    ICU Vital Signs Last 24 Hrs  T(C): 37.7 (15 Aug 2019 00:00), Max: 37.8 (14 Aug 2019 16:00)  T(F): 99.9 (15 Aug 2019 00:00), Max: 100.1 (14 Aug 2019 16:00)  HR: 50 (15 Aug 2019 07:36) (44 - 64)  BP: 95/55 (15 Aug 2019 07:36) (87/55 - 149/62)  BP(mean): 67 (15 Aug 2019 07:36) (67 - 93)  RR: 21 (15 Aug 2019 07:36) (21 - 22)  SpO2: 96% (15 Aug 2019 07:36) (95% - 98%)      General: awake, follows commands  HEENT: JOHANA             Lymph Nodes: No cervical LN   Lungs: Bilateral BS, dec both bases  Cardiovascular: Regular   Abdomen: Soft, Positive BS  Extremities: No clubbing   Skin: Warm  Neurological: Non focal       08-14-19 @ 07:01  -  08-15-19 @ 07:00  --------------------------------------------------------  IN:    Enteral Tube Flush: 310 mL    fentaNYL Infusion.: 535 mL    IV PiggyBack: 100 mL    ns in tub fed vital1: 665 mL    Osmolite: 300 mL    propofol Infusion: 394 mL  Total IN: 2304 mL    OUT:    Indwelling Catheter - Urethral: 1920 mL  Total OUT: 1920 mL    Total NET: 384 mL          LABS:                            13.6   9.88  )-----------( 114      ( 15 Aug 2019 04:34 )             42.0                                               08-15    147<H>  |  104  |  44<H>  ----------------------------<  110<H>  4.0   |  27  |  1.0    Ca    9.0      15 Aug 2019 04:34                                                   CARDIAC MARKERS ( 15 Aug 2019 04:34 )  x     / 1.33 ng/mL / x     / x     / 2.0 ng/mL  CARDIAC MARKERS ( 14 Aug 2019 04:30 )  x     / 1.01 ng/mL / x     / x     / x                                                                                         Culture - Blood (collected 13 Aug 2019 11:58)  Source: .Blood None  Preliminary Report (14 Aug 2019 23:00):    No growth to date.                                                   Mode: AC/ CMV (Assist Control/ Continuous Mandatory Ventilation)  RR (machine): 22  TV (machine): 450  FiO2: 50  PEEP: 10  ITime: 1  MAP: 19  PIP: 37  ABG - ( 14 Aug 2019 12:53 )  pH, Arterial: 7.48  pH, Blood: x     /  pCO2: 44    /  pO2: 80    / HCO3: 32    / Base Excess: 8.0   /  SaO2: 96                  MEDICATIONS  (STANDING):  ALBUTerol/ipratropium (CFC free) Inhaler. 1 Puff(s) Inhalation every 4 hours  ampicillin/sulbactam  IVPB 1.5 Gram(s) IV Intermittent every 6 hours  aspirin  chewable 81 milliGRAM(s) Enteral Tube daily  atorvastatin 80 milliGRAM(s) Oral at bedtime  chlorhexidine 0.12% Liquid 15 milliLiter(s) Oral Mucosa two times a day  chlorhexidine 4% Liquid 1 Application(s) Topical daily  clopidogrel Tablet 75 milliGRAM(s) Oral daily  docusate sodium 100 milliGRAM(s) Oral daily  fentaNYL   Infusion. 0.5 MICROgram(s)/kG/Hr (4.67 mL/Hr) IV Continuous <Continuous>  furosemide   Injectable 40 milliGRAM(s) IV Push every 8 hours  heparin  Injectable 5000 Unit(s) SubCutaneous every 8 hours  pantoprazole   Suspension 40 milliGRAM(s) Enteral Tube daily  propofol Infusion 10 MICROgram(s)/kG/Min (6.3 mL/Hr) IV Continuous <Continuous>  senna 1 Tablet(s) Oral at bedtime    MEDICATIONS  (PRN):  acetaminophen   Tablet .. 650 milliGRAM(s) Oral every 6 hours PRN Temp greater or equal to 38C (100.4F)  ALBUTerol/ipratropium (CFC free) Inhaler. 1 Puff(s) Inhalation every 4 hours PRN Wheezing      Xrays:  Bilateral opacities, improving. ETT OK, OGT OK

## 2019-08-15 NOTE — PROGRESS NOTE ADULT - ASSESSMENT
IMPRESSION:    Acute respiratory failure/ CHF/ 20 TO 25%  RLL PNA  RO aspiration   ? COPD  still on high FIO2      PLAN:    CNS: SAT daily    HEENT: Oral care    PULMONARY:  HOB @ 45 degrees.  repeat ABG, Possible SBT     CARDIOVASCULAR: Cardiology f/up. Continue ASA, neg balance Increase to 60mg q12h    GI: GI prophylaxis.  OG Feeding.     RENAL:  Follow up lytes.  Correct as needed.    INFECTIOUS DISEASE: Follow up cultures. Continue Unasyn.    HEMATOLOGICAL:  DVT prophylaxis.    ENDOCRINE:  Follow up FS.    MUSCULOSKELETAL: Bedrest.    POOR PROGNOSIS IMPRESSION:    Acute respiratory failure/ CHF/ 20 TO 25%  RLL PNA  RO aspiration   ? COPD  still on high FIO2      PLAN:    CNS: SAT daily    HEENT: Oral care    PULMONARY:  HOB @ 45 degrees.  repeat ABG, SBT     CARDIOVASCULAR: Cardiology f/up. Continue ASA, neg balance Increase to 60mg q12h    GI: GI prophylaxis.  OG Feeding.     RENAL:  Follow up lytes.  Correct as needed.    INFECTIOUS DISEASE: Follow up cultures. Continue Unasyn.    HEMATOLOGICAL:  DVT prophylaxis.    ENDOCRINE:  Follow up FS.    MUSCULOSKELETAL: Bedrest.    POOR PROGNOSIS

## 2019-08-15 NOTE — PROGRESS NOTE ADULT - ASSESSMENT
Respiratory failure.  Patient does not appear fluid overloaded - hold Lasix  Keep in neutral balance.  C/w ASA/Plavix, statin  No BB due to bradycardia  Weaning as per pulmonary  Obtain records from [facility]  Ischemic work-up once stable from respiratory perspective.

## 2019-08-15 NOTE — PROGRESS NOTE ADULT - ASSESSMENT
A 58 yo M (named Mr. Dimitry Frazier) w/ PMH of COPD (smoke 4 packs/day for over 30 years), CAD s/p CABG, DM and hx of illicit drug use presents to the ED unresponsive.      no fever, on vent, no pressors     #RS failure: pneumonia VS aspiration pneumonitis VS COPD VS Acute decomp HF   -LASIX 40 IV Q8H  -solumedrol 60 mg q8h  -albuterol/ipratropium q4h  -c/w unasyn, f/u cultures  -trend troponin  -Monitor Is & Os      #CHF AND ACS, EF 20-25%  -c/w ASA and statin  -restart BB and ACEi when BP permits, currently off pressors   -repeat troponin  -cardio Will consider cath once more stable.    #DVT ppx  -UFH    #DM: monitor FS    #GI prophylaxis. protonix,  OG Feeding A 60 yo M (named Mr. Dimitry Frazier) w/ PMH of COPD (smoke 4 packs/day for over 30 years), CAD s/p CABG, DM and hx of illicit drug use presents to the ED unresponsive.      stays stable on Vent, off pressors, still requires high o2    #I tried calling his brother but didn't have response I spoke to his relative, Carl Phipps, reported pt f/u at [facility], with cardiology Dr. Darius Herrera,   I called the office and will send us fax of medical records.     #RS failure:  aspiration pneumonitis VS aspiration VS COPD VS  HF, PE r/u by CTA  -ABG monitoring, try SBT if tolerated   -net fluid +120  -LASIX 60 IV Q12H  -c/w unasyn, f/u cultures  -off steroid   -albuterol/ipratropium q4h  -trend troponin  -Monitor Is & Os      #CHF AND ACS, EF 20-25%  -c/w ASA and statin  -restart BB and ACEI when BP permits, currently off pressors   -monitor troponin  -cardio Will consider cath once more stable.    #DVT ppx  -UFH    #DM: monitor FS    #GI prophylaxis. protonix,  OG Feeding

## 2019-08-15 NOTE — PROGRESS NOTE ADULT - SUBJECTIVE AND OBJECTIVE BOX
Patient is a 59y old  Male who presents with a chief complaint of Chest pain (15 Aug 2019 09:13)          SUBJ:  Patient seen and examined. Remains intubated. FiO2 down to 40%, but still on high PEEP.  Elevated troponin, with low CPK. Sedated, but follows commands.      MEDICATIONS  (STANDING):  ALBUTerol/ipratropium (CFC free) Inhaler. 1 Puff(s) Inhalation every 4 hours  ampicillin/sulbactam  IVPB 1.5 Gram(s) IV Intermittent every 6 hours  aspirin  chewable 81 milliGRAM(s) Enteral Tube daily  atorvastatin 80 milliGRAM(s) Oral at bedtime  chlorhexidine 0.12% Liquid 15 milliLiter(s) Oral Mucosa two times a day  chlorhexidine 4% Liquid 1 Application(s) Topical daily  clopidogrel Tablet 75 milliGRAM(s) Oral daily  docusate sodium 100 milliGRAM(s) Oral daily  fentaNYL   Infusion. 0.5 MICROgram(s)/kG/Hr (4.67 mL/Hr) IV Continuous <Continuous>  furosemide   Injectable 60 milliGRAM(s) IV Push every 12 hours  heparin  Injectable 5000 Unit(s) SubCutaneous every 8 hours  pantoprazole   Suspension 40 milliGRAM(s) Enteral Tube daily  propofol Infusion 10 MICROgram(s)/kG/Min (6.3 mL/Hr) IV Continuous <Continuous>  senna 1 Tablet(s) Oral at bedtime    MEDICATIONS  (PRN):  acetaminophen   Tablet .. 650 milliGRAM(s) Oral every 6 hours PRN Temp greater or equal to 38C (100.4F)  ALBUTerol/ipratropium (CFC free) Inhaler. 1 Puff(s) Inhalation every 4 hours PRN Wheezing            Vital Signs Last 24 Hrs  T(C): 36.7 (15 Aug 2019 08:00), Max: 37.8 (14 Aug 2019 16:00)  T(F): 98 (15 Aug 2019 08:00), Max: 100.1 (14 Aug 2019 16:00)  HR: 54 (15 Aug 2019 10:00) (44 - 68)  BP: 92/54 (15 Aug 2019 10:00) (87/55 - 149/62)  BP(mean): 67 (15 Aug 2019 08:00) (67 - 93)  RR: 21 (15 Aug 2019 10:00) (21 - 22)  SpO2: 94% (15 Aug 2019 10:00) (94% - 98%)      PHYSICAL EXAM:    GEN: intubated, sedated  HEENT: NC/AT  Neck: No JVD  CV: Reg, S1-S2, no murmur  Lungs: CTAB  Abd: Soft, non-tender  Ext: No edema, + venostasis      I&O's Summary    14 Aug 2019 07:01  -  15 Aug 2019 07:00  --------------------------------------------------------  IN: 2304 mL / OUT: 1920 mL / NET: 384 mL    	    TELEMETRY:  SB    ECG:  < from: 12 Lead ECG (08.15.19 @ 07:23) >   Sinus bradycardia with sinus arrhythmia  Minimal voltage criteria for LVH, may be normal variant  Inferior infarct , age undetermined  Abnormal ECG    < end of copied text >    TTE:  < from: Transthoracic Echocardiogram (08.14.19 @ 14:08) >  Summary:   1. Left ventricular ejection fraction, by visual estimation,is 20 to   25%.   2. Spectral Doppler shows pseudonormal pattern of left ventricular   myocardial filling (Grade II diastolic dysfunction).   3. Mitral annular calcification.   4. Sclerotic aortic valve with normal opening.   5. Mild dilatation of the aortic root.   6. Estimated pulmonary artery systolic pressure is 47.1 mmHg assuming a   right atrial pressure of 15 mmHg, which is consistent with mild pulmonary   hypertension.   7. LAD distribution, entire septum, and entire apex are abnormal as   described above.    < end of copied text >      LABS:                        13.6   9.88  )-----------( 114      ( 15 Aug 2019 04:34 )             42.0     08-15    147<H>  |  104  |  44<H>  ----------------------------<  110<H>  4.0   |  27  |  1.0    Ca    9.0      15 Aug 2019 04:34      CARDIAC MARKERS ( 15 Aug 2019 04:34 )  x     / 1.33 ng/mL / x     / x     / 2.0 ng/mL  CARDIAC MARKERS ( 14 Aug 2019 04:30 )  x     / 1.01 ng/mL / x     / x     / x                BNP  RADIOLOGY & ADDITIONAL STUDIES:      IMPRESSION AND PLAN:

## 2019-08-15 NOTE — PROGRESS NOTE ADULT - SUBJECTIVE AND OBJECTIVE BOX
Patient is a 59y old  Male who presents with a chief complaint of Chest pain (15 Aug 2019 11:15)      OVERNIGHT EVENTS: stays stable on Vent, still on 50% Fio2    SUBJECTIVE / INTERVAL HPI: Patient seen and examined at bedside.     VITAL SIGNS:  Vital Signs Last 24 Hrs  T(C): 36.7 (15 Aug 2019 08:00), Max: 37.8 (14 Aug 2019 16:00)  T(F): 98 (15 Aug 2019 08:00), Max: 100.1 (14 Aug 2019 16:00)  HR: 52 (15 Aug 2019 14:00) (44 - 68)  BP: 112/62 (15 Aug 2019 14:00) (87/55 - 149/62)  BP(mean): 84 (15 Aug 2019 14:00) (67 - 93)  RR: 21 (15 Aug 2019 14:00) (21 - 22)  SpO2: 95% (15 Aug 2019 14:00) (94% - 98%)    PHYSICAL EXAM:    General: WDWN  HEENT: NC/AT; PERRL, clear conjunctiva  Neck: supple  Cardiovascular: +S1/S2; RRR  Respiratory: CTA b/l; no W/R/R  Gastrointestinal: soft, NT/ND; +BSx4  Extremities: WWP; 2+ peripheral pulses; no edema   Neurological: AAOx3; no focal deficits    MEDICATIONS:  MEDICATIONS  (STANDING):  ALBUTerol/ipratropium (CFC free) Inhaler. 1 Puff(s) Inhalation every 4 hours  ampicillin/sulbactam  IVPB 1.5 Gram(s) IV Intermittent every 6 hours  aspirin  chewable 81 milliGRAM(s) Enteral Tube daily  atorvastatin 80 milliGRAM(s) Oral at bedtime  chlorhexidine 0.12% Liquid 15 milliLiter(s) Oral Mucosa two times a day  chlorhexidine 4% Liquid 1 Application(s) Topical daily  clopidogrel Tablet 75 milliGRAM(s) Oral daily  docusate sodium 100 milliGRAM(s) Oral daily  fentaNYL   Infusion. 0.5 MICROgram(s)/kG/Hr (4.67 mL/Hr) IV Continuous <Continuous>  furosemide   Injectable 60 milliGRAM(s) IV Push every 12 hours  heparin  Injectable 5000 Unit(s) SubCutaneous every 8 hours  pantoprazole   Suspension 40 milliGRAM(s) Enteral Tube daily  propofol Infusion 10 MICROgram(s)/kG/Min (6.3 mL/Hr) IV Continuous <Continuous>  senna 1 Tablet(s) Oral at bedtime    MEDICATIONS  (PRN):  acetaminophen   Tablet .. 650 milliGRAM(s) Oral every 6 hours PRN Temp greater or equal to 38C (100.4F)  ALBUTerol/ipratropium (CFC free) Inhaler. 1 Puff(s) Inhalation every 4 hours PRN Wheezing      ALLERGIES:  Allergies    No Known Allergies    Intolerances        LABS:                        13.6   9.88  )-----------( 114      ( 15 Aug 2019 04:34 )             42.0     08-15    147<H>  |  104  |  44<H>  ----------------------------<  110<H>  4.0   |  27  |  1.0    Ca    9.0      15 Aug 2019 04:34          CAPILLARY BLOOD GLUCOSE      POCT Blood Glucose.: 105 mg/dL (15 Aug 2019 05:46)    Troponin T, Serum: 0.13: Critical value: ng/mL (08.11.19 @ 04:29)  Troponin T, Serum in AM (08.14.19 @ 04:30)    Troponin T, Serum: 1.01: Critical value: ng/mL  Troponin T, Serum in AM (08.15.19 @ 04:34)    Troponin T, Serum: 1.33: Hemolyzed. Interpret with caution  Critical value: ng/mL      RADIOLOGY & ADDITIONAL TESTS: Reviewed. Patient is a 59y old  Male who presents with a chief complaint of Chest pain (15 Aug 2019 11:15)      OVERNIGHT EVENTS: stays stable on Vent, off pressors, still requires high o2    SUBJECTIVE / INTERVAL HPI: Patient seen and examined at bedside.     VITAL SIGNS:  Vital Signs Last 24 Hrs  T(C): 36.7 (15 Aug 2019 08:00), Max: 37.8 (14 Aug 2019 16:00)  T(F): 98 (15 Aug 2019 08:00), Max: 100.1 (14 Aug 2019 16:00)  HR: 52 (15 Aug 2019 14:00) (44 - 68)  BP: 112/62 (15 Aug 2019 14:00) (87/55 - 149/62)  BP(mean): 84 (15 Aug 2019 14:00) (67 - 93)  RR: 21 (15 Aug 2019 14:00) (21 - 22)  SpO2: 95% (15 Aug 2019 14:00) (94% - 98%)    PHYSICAL EXAM:    General: WDWN  HEENT: NC/AT; PERRL, clear conjunctiva  Neck: supple  Cardiovascular: +S1/S2; RRR  Respiratory: CTA b/l; no W/R/R  Gastrointestinal: soft, NT/ND; +BSx4  Extremities: WWP; 2+ peripheral pulses; no edema   Neurological: no focal deficits    MEDICATIONS:  MEDICATIONS  (STANDING):  ALBUTerol/ipratropium (CFC free) Inhaler. 1 Puff(s) Inhalation every 4 hours  ampicillin/sulbactam  IVPB 1.5 Gram(s) IV Intermittent every 6 hours  aspirin  chewable 81 milliGRAM(s) Enteral Tube daily  atorvastatin 80 milliGRAM(s) Oral at bedtime  chlorhexidine 0.12% Liquid 15 milliLiter(s) Oral Mucosa two times a day  chlorhexidine 4% Liquid 1 Application(s) Topical daily  clopidogrel Tablet 75 milliGRAM(s) Oral daily  docusate sodium 100 milliGRAM(s) Oral daily  fentaNYL   Infusion. 0.5 MICROgram(s)/kG/Hr (4.67 mL/Hr) IV Continuous <Continuous>  furosemide   Injectable 60 milliGRAM(s) IV Push every 12 hours  heparin  Injectable 5000 Unit(s) SubCutaneous every 8 hours  pantoprazole   Suspension 40 milliGRAM(s) Enteral Tube daily  propofol Infusion 10 MICROgram(s)/kG/Min (6.3 mL/Hr) IV Continuous <Continuous>  senna 1 Tablet(s) Oral at bedtime    MEDICATIONS  (PRN):  acetaminophen   Tablet .. 650 milliGRAM(s) Oral every 6 hours PRN Temp greater or equal to 38C (100.4F)  ALBUTerol/ipratropium (CFC free) Inhaler. 1 Puff(s) Inhalation every 4 hours PRN Wheezing      ALLERGIES:  Allergies    No Known Allergies    Intolerances        LABS:                        13.6   9.88  )-----------( 114      ( 15 Aug 2019 04:34 )             42.0     08-15    147<H>  |  104  |  44<H>  ----------------------------<  110<H>  4.0   |  27  |  1.0    Ca    9.0      15 Aug 2019 04:34          CAPILLARY BLOOD GLUCOSE      POCT Blood Glucose.: 105 mg/dL (15 Aug 2019 05:46)    Troponin T, Serum: 0.13: Critical value: ng/mL (08.11.19 @ 04:29)  Troponin T, Serum in AM (08.14.19 @ 04:30)    Troponin T, Serum: 1.01: Critical value: ng/mL  Troponin T, Serum in AM (08.15.19 @ 04:34)    Troponin T, Serum: 1.33: Hemolyzed. Interpret with caution  Critical value: ng/mL      RADIOLOGY & ADDITIONAL TESTS: Reviewed.  < from: Transthoracic Echocardiogram (08.14.19 @ 14:08) >  Summary:   1. Left ventricular ejection fraction, by visual estimation,is 20 to   25%.   2. Spectral Doppler shows pseudonormal pattern of left ventricular   myocardial filling (Grade II diastolic dysfunction).    < end of copied text >  < from: Xray Chest 1 View- PORTABLE-Routine (08.15.19 @ 06:18) >  Impression:     Trace left pleural effusion.    < from: CT Angio Chest w/ IV Cont (08.11.19 @ 00:00) >  IMPRESSION:     No CTA evidence of acute pulmonary embolus.    < end of copied text >  < end of copied text >

## 2019-08-16 LAB
ANION GAP SERPL CALC-SCNC: 16 MMOL/L — HIGH (ref 7–14)
ANION GAP SERPL CALC-SCNC: 17 MMOL/L — HIGH (ref 7–14)
BASE EXCESS BLDA CALC-SCNC: 10.7 MMOL/L — HIGH (ref -2–2)
BASE EXCESS BLDA CALC-SCNC: 11.1 MMOL/L — HIGH (ref -2–2)
BASOPHILS # BLD AUTO: 0.02 K/UL — SIGNIFICANT CHANGE UP (ref 0–0.2)
BASOPHILS NFR BLD AUTO: 0.2 % — SIGNIFICANT CHANGE UP (ref 0–1)
BUN SERPL-MCNC: 41 MG/DL — HIGH (ref 10–20)
BUN SERPL-MCNC: 49 MG/DL — HIGH (ref 10–20)
CALCIUM SERPL-MCNC: 8.9 MG/DL — SIGNIFICANT CHANGE UP (ref 8.5–10.1)
CALCIUM SERPL-MCNC: 9.3 MG/DL — SIGNIFICANT CHANGE UP (ref 8.5–10.1)
CHLORIDE SERPL-SCNC: 102 MMOL/L — SIGNIFICANT CHANGE UP (ref 98–110)
CHLORIDE SERPL-SCNC: 105 MMOL/L — SIGNIFICANT CHANGE UP (ref 98–110)
CO2 SERPL-SCNC: 27 MMOL/L — SIGNIFICANT CHANGE UP (ref 17–32)
CO2 SERPL-SCNC: 28 MMOL/L — SIGNIFICANT CHANGE UP (ref 17–32)
CREAT SERPL-MCNC: 0.9 MG/DL — SIGNIFICANT CHANGE UP (ref 0.7–1.5)
CREAT SERPL-MCNC: 0.9 MG/DL — SIGNIFICANT CHANGE UP (ref 0.7–1.5)
CULTURE RESULTS: SIGNIFICANT CHANGE UP
CULTURE RESULTS: SIGNIFICANT CHANGE UP
EOSINOPHIL # BLD AUTO: 0.23 K/UL — SIGNIFICANT CHANGE UP (ref 0–0.7)
EOSINOPHIL NFR BLD AUTO: 2.2 % — SIGNIFICANT CHANGE UP (ref 0–8)
GAS PNL BLDA: SIGNIFICANT CHANGE UP
GLUCOSE BLDC GLUCOMTR-MCNC: 149 MG/DL — HIGH (ref 70–99)
GLUCOSE SERPL-MCNC: 120 MG/DL — HIGH (ref 70–99)
GLUCOSE SERPL-MCNC: 86 MG/DL — SIGNIFICANT CHANGE UP (ref 70–99)
HCO3 BLDA-SCNC: 35 MMOL/L — HIGH (ref 23–27)
HCO3 BLDA-SCNC: 37 MMOL/L — HIGH (ref 23–27)
HCT VFR BLD CALC: 43.6 % — SIGNIFICANT CHANGE UP (ref 42–52)
HGB BLD-MCNC: 13.9 G/DL — LOW (ref 14–18)
HOROWITZ INDEX BLDA+IHG-RTO: 50 — SIGNIFICANT CHANGE UP
IMM GRANULOCYTES NFR BLD AUTO: 0.6 % — HIGH (ref 0.1–0.3)
LYMPHOCYTES # BLD AUTO: 1.2 K/UL — SIGNIFICANT CHANGE UP (ref 1.2–3.4)
LYMPHOCYTES # BLD AUTO: 11.5 % — LOW (ref 20.5–51.1)
MCHC RBC-ENTMCNC: 29.2 PG — SIGNIFICANT CHANGE UP (ref 27–31)
MCHC RBC-ENTMCNC: 31.9 G/DL — LOW (ref 32–37)
MCV RBC AUTO: 91.6 FL — SIGNIFICANT CHANGE UP (ref 80–94)
MONOCYTES # BLD AUTO: 1.16 K/UL — HIGH (ref 0.1–0.6)
MONOCYTES NFR BLD AUTO: 11.1 % — HIGH (ref 1.7–9.3)
NEUTROPHILS # BLD AUTO: 7.75 K/UL — HIGH (ref 1.4–6.5)
NEUTROPHILS NFR BLD AUTO: 74.4 % — SIGNIFICANT CHANGE UP (ref 42.2–75.2)
NRBC # BLD: 0 /100 WBCS — SIGNIFICANT CHANGE UP (ref 0–0)
NT-PROBNP SERPL-SCNC: 391 PG/ML — HIGH (ref 0–300)
PCO2 BLDA: 45 MMHG — HIGH (ref 38–42)
PCO2 BLDA: 51 MMHG — HIGH (ref 38–42)
PH BLDA: 7.47 — HIGH (ref 7.38–7.42)
PH BLDA: 7.5 — HIGH (ref 7.38–7.42)
PLATELET # BLD AUTO: 127 K/UL — LOW (ref 130–400)
PO2 BLDA: 77 MMHG — LOW (ref 78–95)
PO2 BLDA: 82 MMHG — SIGNIFICANT CHANGE UP (ref 78–95)
POTASSIUM SERPL-MCNC: 3.8 MMOL/L — SIGNIFICANT CHANGE UP (ref 3.5–5)
POTASSIUM SERPL-MCNC: 4.8 MMOL/L — SIGNIFICANT CHANGE UP (ref 3.5–5)
POTASSIUM SERPL-SCNC: 3.8 MMOL/L — SIGNIFICANT CHANGE UP (ref 3.5–5)
POTASSIUM SERPL-SCNC: 4.8 MMOL/L — SIGNIFICANT CHANGE UP (ref 3.5–5)
RBC # BLD: 4.76 M/UL — SIGNIFICANT CHANGE UP (ref 4.7–6.1)
RBC # FLD: 14.1 % — SIGNIFICANT CHANGE UP (ref 11.5–14.5)
SAO2 % BLDA: 96 % — SIGNIFICANT CHANGE UP (ref 94–98)
SAO2 % BLDA: 96 % — SIGNIFICANT CHANGE UP (ref 94–98)
SODIUM SERPL-SCNC: 145 MMOL/L — SIGNIFICANT CHANGE UP (ref 135–146)
SODIUM SERPL-SCNC: 150 MMOL/L — HIGH (ref 135–146)
SPECIMEN SOURCE: SIGNIFICANT CHANGE UP
SPECIMEN SOURCE: SIGNIFICANT CHANGE UP
TROPONIN T SERPL-MCNC: 1.42 NG/ML — CRITICAL HIGH
WBC # BLD: 10.42 K/UL — SIGNIFICANT CHANGE UP (ref 4.8–10.8)
WBC # FLD AUTO: 10.42 K/UL — SIGNIFICANT CHANGE UP (ref 4.8–10.8)

## 2019-08-16 PROCEDURE — 99232 SBSQ HOSP IP/OBS MODERATE 35: CPT

## 2019-08-16 PROCEDURE — 71045 X-RAY EXAM CHEST 1 VIEW: CPT | Mod: 26

## 2019-08-16 PROCEDURE — 93010 ELECTROCARDIOGRAM REPORT: CPT

## 2019-08-16 RX ORDER — SODIUM CHLORIDE 9 MG/ML
1000 INJECTION, SOLUTION INTRAVENOUS
Refills: 0 | Status: DISCONTINUED | OUTPATIENT
Start: 2019-08-16 | End: 2019-08-17

## 2019-08-16 RX ORDER — FUROSEMIDE 40 MG
60 TABLET ORAL DAILY
Refills: 0 | Status: DISCONTINUED | OUTPATIENT
Start: 2019-08-17 | End: 2019-08-17

## 2019-08-16 RX ADMIN — HEPARIN SODIUM 5000 UNIT(S): 5000 INJECTION INTRAVENOUS; SUBCUTANEOUS at 14:10

## 2019-08-16 RX ADMIN — AMPICILLIN SODIUM AND SULBACTAM SODIUM 100 GRAM(S): 250; 125 INJECTION, POWDER, FOR SUSPENSION INTRAMUSCULAR; INTRAVENOUS at 06:09

## 2019-08-16 RX ADMIN — HEPARIN SODIUM 5000 UNIT(S): 5000 INJECTION INTRAVENOUS; SUBCUTANEOUS at 05:55

## 2019-08-16 RX ADMIN — AMPICILLIN SODIUM AND SULBACTAM SODIUM 100 GRAM(S): 250; 125 INJECTION, POWDER, FOR SUSPENSION INTRAMUSCULAR; INTRAVENOUS at 11:25

## 2019-08-16 RX ADMIN — SODIUM CHLORIDE 75 MILLILITER(S): 9 INJECTION, SOLUTION INTRAVENOUS at 10:27

## 2019-08-16 RX ADMIN — HEPARIN SODIUM 5000 UNIT(S): 5000 INJECTION INTRAVENOUS; SUBCUTANEOUS at 21:09

## 2019-08-16 RX ADMIN — CHLORHEXIDINE GLUCONATE 15 MILLILITER(S): 213 SOLUTION TOPICAL at 05:55

## 2019-08-16 RX ADMIN — FENTANYL CITRATE 4.67 MICROGRAM(S)/KG/HR: 50 INJECTION INTRAVENOUS at 02:49

## 2019-08-16 RX ADMIN — Medication 60 MILLIGRAM(S): at 05:55

## 2019-08-16 RX ADMIN — Medication 100 MILLIGRAM(S): at 11:25

## 2019-08-16 RX ADMIN — CLOPIDOGREL BISULFATE 75 MILLIGRAM(S): 75 TABLET, FILM COATED ORAL at 11:25

## 2019-08-16 RX ADMIN — PANTOPRAZOLE SODIUM 40 MILLIGRAM(S): 20 TABLET, DELAYED RELEASE ORAL at 12:00

## 2019-08-16 RX ADMIN — Medication 81 MILLIGRAM(S): at 11:25

## 2019-08-16 RX ADMIN — CHLORHEXIDINE GLUCONATE 1 APPLICATION(S): 213 SOLUTION TOPICAL at 05:55

## 2019-08-16 RX ADMIN — AMPICILLIN SODIUM AND SULBACTAM SODIUM 100 GRAM(S): 250; 125 INJECTION, POWDER, FOR SUSPENSION INTRAMUSCULAR; INTRAVENOUS at 17:12

## 2019-08-16 RX ADMIN — CHLORHEXIDINE GLUCONATE 15 MILLILITER(S): 213 SOLUTION TOPICAL at 17:13

## 2019-08-16 NOTE — PROGRESS NOTE ADULT - SUBJECTIVE AND OBJECTIVE BOX
Patient is a 59y old  Male who presents with a chief complaint of Chest pain (16 Aug 2019 08:19)        Over Night Events: Still on MV. Sedated on Propofol. Awake.     ROS:  See HPI    PHYSICAL EXAM    ICU Vital Signs Last 24 Hrs  T(C): 37.1 (16 Aug 2019 04:00), Max: 38 (15 Aug 2019 20:00)  T(F): 98.8 (16 Aug 2019 04:00), Max: 100.4 (15 Aug 2019 20:00)  HR: 74 (16 Aug 2019 07:45) (50 - 80)  BP: 102/60 (16 Aug 2019 06:00) (92/54 - 119/63)  BP(mean): 73 (16 Aug 2019 06:00) (68 - 91)  ABP: --  ABP(mean): --  RR: 21 (16 Aug 2019 06:00) (20 - 22)  SpO2: 99% (16 Aug 2019 07:45) (94% - 99%)      General: Awake on propofol.  HEENT: ET +            Lymphatic system: No cervical LN   Lungs: Bilateral BS  Cardiovascular: Regular   Gastrointestinal: Soft, Positive BS  Extremities: No clubbing.   Skin: Warm, intact  Neurological: No motor or sensory deficit       08-15-19 @ 07:01  -  08-16-19 @ 07:00  --------------------------------------------------------  IN:    Enteral Tube Flush: 260 mL    fentaNYL Infusion.: 225 mL    IV PiggyBack: 200 mL    ns in tub fed vital1: 805 mL    propofol Infusion: 90 mL  Total IN: 1580 mL    OUT:    Indwelling Catheter - Urethral: 1809 mL  Total OUT: 1809 mL    Total NET: -229 mL      LABS:                        13.9   10.42 )-----------( 127      ( 16 Aug 2019 04:37 )             43.6                                               08-16    150<H>  |  105  |  49<H>  ----------------------------<  120<H>  3.8   |  28  |  0.9    Ca    8.9      16 Aug 2019 04:37                                             CARDIAC MARKERS ( 16 Aug 2019 04:37 )  x     / 1.42 ng/mL / x     / x     / x      CARDIAC MARKERS ( 15 Aug 2019 04:34 )  x     / 1.33 ng/mL / x     / x     / 2.0 ng/mL                                                                             Culture - Blood (collected 13 Aug 2019 11:58)  Source: .Blood None  Preliminary Report (14 Aug 2019 23:00):    No growth to date.                                         Mode: AC/ CMV (Assist Control/ Continuous Mandatory Ventilation)  RR (machine): 22  TV (machine): 400  FiO2: 50  PEEP: 10  ITime: 1  MAP: 17  PIP: 36                                      ABG - ( 16 Aug 2019 04:00 )  pH, Arterial: 7.50  pH, Blood: x     /  pCO2: 45    /  pO2: 77    / HCO3: 35    / Base Excess: 10.7  /  SaO2: 96        MEDICATIONS  (STANDING):  ALBUTerol/ipratropium (CFC free) Inhaler. 1 Puff(s) Inhalation every 4 hours  ampicillin/sulbactam  IVPB 1.5 Gram(s) IV Intermittent every 6 hours  aspirin  chewable 81 milliGRAM(s) Enteral Tube daily  atorvastatin 80 milliGRAM(s) Oral at bedtime  chlorhexidine 0.12% Liquid 15 milliLiter(s) Oral Mucosa two times a day  chlorhexidine 4% Liquid 1 Application(s) Topical daily  clopidogrel Tablet 75 milliGRAM(s) Oral daily  docusate sodium 100 milliGRAM(s) Oral daily  fentaNYL   Infusion. 0.5 MICROgram(s)/kG/Hr (4.67 mL/Hr) IV Continuous <Continuous>  furosemide   Injectable 60 milliGRAM(s) IV Push every 12 hours  heparin  Injectable 5000 Unit(s) SubCutaneous every 8 hours  pantoprazole   Suspension 40 milliGRAM(s) Enteral Tube daily  propofol Infusion 10 MICROgram(s)/kG/Min (6.3 mL/Hr) IV Continuous <Continuous>  senna 1 Tablet(s) Oral at bedtime    MEDICATIONS  (PRN):  acetaminophen   Tablet .. 650 milliGRAM(s) Oral every 6 hours PRN Temp greater or equal to 38C (100.4F)  ALBUTerol/ipratropium (CFC free) Inhaler. 1 Puff(s) Inhalation every 4 hours PRN Wheezing      Xrays:                                                                                     ECHO Patient is a 59y old  Male who presents with a chief complaint of Chest pain (16 Aug 2019 08:19)        Over Night Events: Still on MV. Sedated on Propofol. Awake follows commands    ROS:  See HPI    PHYSICAL EXAM    ICU Vital Signs Last 24 Hrs  T(C): 37.1 (16 Aug 2019 04:00), Max: 38 (15 Aug 2019 20:00)  T(F): 98.8 (16 Aug 2019 04:00), Max: 100.4 (15 Aug 2019 20:00)  HR: 74 (16 Aug 2019 07:45) (50 - 80)  BP: 102/60 (16 Aug 2019 06:00) (92/54 - 119/63)  BP(mean): 73 (16 Aug 2019 06:00) (68 - 91)  RR: 21 (16 Aug 2019 06:00) (20 - 22)  SpO2: 99% (16 Aug 2019 07:45) (94% - 99%)      General: Awake on propofol.  HEENT: ET +            Lymphatic system: No cervical LN   Lungs: Bilateral BS, dec bs both bases  Cardiovascular: Regular   Gastrointestinal: Soft, Positive BS  Extremities: No clubbing.   Skin: Warm, intact  Neurological: No motor or sensory deficit       08-15-19 @ 07:01  -  08-16-19 @ 07:00  --------------------------------------------------------  IN:    Enteral Tube Flush: 260 mL    fentaNYL Infusion.: 225 mL    IV PiggyBack: 200 mL    ns in tub fed vital1: 805 mL    propofol Infusion: 90 mL  Total IN: 1580 mL    OUT:    Indwelling Catheter - Urethral: 1809 mL  Total OUT: 1809 mL    Total NET: -229 mL      LABS:                        13.9   10.42 )-----------( 127      ( 16 Aug 2019 04:37 )             43.6                                               08-16    150<H>  |  105  |  49<H>  ----------------------------<  120<H>  3.8   |  28  |  0.9    Ca    8.9      16 Aug 2019 04:37                                             CARDIAC MARKERS ( 16 Aug 2019 04:37 )  x     / 1.42 ng/mL / x     / x     / x      CARDIAC MARKERS ( 15 Aug 2019 04:34 )  x     / 1.33 ng/mL / x     / x     / 2.0 ng/mL                                                                             Culture - Blood (collected 13 Aug 2019 11:58)  Source: .Blood None  Preliminary Report (14 Aug 2019 23:00):    No growth to date.                                         Mode: AC/ CMV (Assist Control/ Continuous Mandatory Ventilation)  RR (machine): 22  TV (machine): 400  FiO2: 50  PEEP: 10  ITime: 1  MAP: 17  PIP: 36                                      ABG - ( 16 Aug 2019 04:00 )  pH, Arterial: 7.50  pH, Blood: x     /  pCO2: 45    /  pO2: 77    / HCO3: 35    / Base Excess: 10.7  /  SaO2: 96        MEDICATIONS  (STANDING):  ALBUTerol/ipratropium (CFC free) Inhaler. 1 Puff(s) Inhalation every 4 hours  ampicillin/sulbactam  IVPB 1.5 Gram(s) IV Intermittent every 6 hours  aspirin  chewable 81 milliGRAM(s) Enteral Tube daily  atorvastatin 80 milliGRAM(s) Oral at bedtime  chlorhexidine 0.12% Liquid 15 milliLiter(s) Oral Mucosa two times a day  chlorhexidine 4% Liquid 1 Application(s) Topical daily  clopidogrel Tablet 75 milliGRAM(s) Oral daily  docusate sodium 100 milliGRAM(s) Oral daily  fentaNYL   Infusion. 0.5 MICROgram(s)/kG/Hr (4.67 mL/Hr) IV Continuous <Continuous>  furosemide   Injectable 60 milliGRAM(s) IV Push every 12 hours  heparin  Injectable 5000 Unit(s) SubCutaneous every 8 hours  pantoprazole   Suspension 40 milliGRAM(s) Enteral Tube daily  propofol Infusion 10 MICROgram(s)/kG/Min (6.3 mL/Hr) IV Continuous <Continuous>  senna 1 Tablet(s) Oral at bedtime    MEDICATIONS  (PRN):  acetaminophen   Tablet .. 650 milliGRAM(s) Oral every 6 hours PRN Temp greater or equal to 38C (100.4F)  ALBUTerol/ipratropium (CFC free) Inhaler. 1 Puff(s) Inhalation every 4 hours PRN Wheezing      Xrays:                                                                                     ECHO

## 2019-08-16 NOTE — PROGRESS NOTE ADULT - ASSESSMENT
CAD  NSTEMI, likely type 2  Systolic dysfunction  COPD  Aspiration    Sodium is trending up. Would hold Lasix, consider gentle rehydration.   Correct electrolytes.  C/w DAPT, statin.  No bb due to episodes fo bradycardia.  Pulmonary follow-up.

## 2019-08-16 NOTE — PROGRESS NOTE ADULT - SUBJECTIVE AND OBJECTIVE BOX
Patient is a 59y old  Male who presents with a chief complaint of Chest pain (15 Aug 2019 15:26)        SUBJ:  Patient seen and examined. No new events. Remains intubated.      MEDICATIONS  (STANDING):  ALBUTerol/ipratropium (CFC free) Inhaler. 1 Puff(s) Inhalation every 4 hours  ampicillin/sulbactam  IVPB 1.5 Gram(s) IV Intermittent every 6 hours  aspirin  chewable 81 milliGRAM(s) Enteral Tube daily  atorvastatin 80 milliGRAM(s) Oral at bedtime  chlorhexidine 0.12% Liquid 15 milliLiter(s) Oral Mucosa two times a day  chlorhexidine 4% Liquid 1 Application(s) Topical daily  clopidogrel Tablet 75 milliGRAM(s) Oral daily  docusate sodium 100 milliGRAM(s) Oral daily  fentaNYL   Infusion. 0.5 MICROgram(s)/kG/Hr (4.67 mL/Hr) IV Continuous <Continuous>  furosemide   Injectable 60 milliGRAM(s) IV Push every 12 hours  heparin  Injectable 5000 Unit(s) SubCutaneous every 8 hours  pantoprazole   Suspension 40 milliGRAM(s) Enteral Tube daily  propofol Infusion 10 MICROgram(s)/kG/Min (6.3 mL/Hr) IV Continuous <Continuous>  senna 1 Tablet(s) Oral at bedtime    MEDICATIONS  (PRN):  acetaminophen   Tablet .. 650 milliGRAM(s) Oral every 6 hours PRN Temp greater or equal to 38C (100.4F)  ALBUTerol/ipratropium (CFC free) Inhaler. 1 Puff(s) Inhalation every 4 hours PRN Wheezing            Vital Signs Last 24 Hrs  T(C): 37.1 (16 Aug 2019 04:00), Max: 38 (15 Aug 2019 20:00)  T(F): 98.8 (16 Aug 2019 04:00), Max: 100.4 (15 Aug 2019 20:00)  HR: 74 (16 Aug 2019 07:45) (50 - 80)  BP: 102/60 (16 Aug 2019 06:00) (92/54 - 119/63)  BP(mean): 73 (16 Aug 2019 06:00) (68 - 91)  RR: 21 (16 Aug 2019 06:00) (20 - 22)  SpO2: 99% (16 Aug 2019 07:45) (94% - 99%)      PHYSICAL EXAM:    GEN: intubated, sedated, responsive to verbal stimuli  HEENT: NC/AT  Neck: No JVD  CV: Reg, S1-S2  Lungs: CTAB  Abd: Soft, non-tender  Ext: No edema      I&O's Summary    15 Aug 2019 07:01  -  16 Aug 2019 07:00  --------------------------------------------------------  IN: 1580 mL / OUT: 1809 mL / NET: -229 mL    	    LABS:                        13.9   10.42 )-----------( 127      ( 16 Aug 2019 04:37 )             43.6     08-16    150<H>  |  105  |  49<H>  ----------------------------<  120<H>  3.8   |  28  |  0.9    Ca    8.9      16 Aug 2019 04:37      CARDIAC MARKERS ( 16 Aug 2019 04:37 )  x     / 1.42 ng/mL / x     / x     / x      CARDIAC MARKERS ( 15 Aug 2019 04:34 )  x     / 1.33 ng/mL / x     / x     / 2.0 ng/mL            BNPSerum Pro-Brain Natriuretic Peptide: 391 pg/mL (08-16 @ 04:37)    RADIOLOGY & ADDITIONAL STUDIES:      IMPRESSION AND PLAN:

## 2019-08-16 NOTE — PROGRESS NOTE ADULT - ASSESSMENT
IMPRESSION:    Acute respiratory failure/ CHF/ 20 TO 25%  RLL PNA  RO aspiration   ? COPD      PLAN:    CNS: SAT.     HEENT: Oral care    PULMONARY:  HOB @ 45 degrees.  Decrease Rate to 16.     CARDIOVASCULAR: Cardiology f/up. Continue ASA, Plavix. LAsix 60mg q24h for now.     GI: GI prophylaxis.  OG Feeding.     RENAL:  Follow up lytes.  Correct as needed. Start on free water 75 cc/hr. Recheck BMP afternoon.     INFECTIOUS DISEASE: Follow up cultures. Continue Unasyn for total of 7 days.     HEMATOLOGICAL:  DVT prophylaxis.    ENDOCRINE:  Follow up FS.    MUSCULOSKELETAL: Bedrest.    POOR PROGNOSIS IMPRESSION:    Acute respiratory failure/ CHF/ 20 TO 25%  RLL PNA/ aspiration/ cx neg  COPD/ GOSIA      PLAN:    CNS: SAT.     HEENT: Oral care    PULMONARY:  HOB @ 45 degrees.  Decrease Rate to 16.     CARDIOVASCULAR: Cardiology f/up. Continue ASA, Plavix. hold diuresis today    GI: GI prophylaxis.  OG Feeding.     RENAL:  Follow up lytes.  Correct as needed. Start on free water 75 cc/hr. Recheck BMP afternoon.     INFECTIOUS DISEASE: Follow up cultures. Continue Unasyn for total of 7 days.     HEMATOLOGICAL:  DVT prophylaxis.    ENDOCRINE:  Follow up FS.    MUSCULOSKELETAL: Bedrest.    POOR PROGNOSIS

## 2019-08-16 NOTE — PROGRESS NOTE ADULT - SUBJECTIVE AND OBJECTIVE BOX
Patient is a 59y old  Male who presents with a chief complaint of Chest pain (16 Aug 2019 09:23)      OVERNIGHT EVENTS: stable, off sedation, no pressors    SUBJECTIVE / INTERVAL HPI: Patient seen and examined at bedside.     VITAL SIGNS:  Vital Signs Last 24 Hrs  T(C): 36.7 (16 Aug 2019 12:00), Max: 38 (15 Aug 2019 20:00)  T(F): 98 (16 Aug 2019 12:00), Max: 100.4 (15 Aug 2019 20:00)  HR: 68 (16 Aug 2019 16:00) (60 - 80)  BP: 117/68 (16 Aug 2019 16:00) (98/62 - 117/68)  BP(mean): 81 (16 Aug 2019 16:00) (68 - 91)  RR: 15 (16 Aug 2019 16:00) (15 - 25)  SpO2: 98% (16 Aug 2019 16:00) (96% - 99%)    PHYSICAL EXAM:    General: WDWN  HEENT: NC/AT; PERRL, clear conjunctiva  Neck: supple  Cardiovascular: +S1/S2; RRR  Respiratory: CTA b/l; no W/R/R  Gastrointestinal: soft, NT/ND; +BSx4  Extremities: WWP; 2+ peripheral pulses; no edema   Neurological: no focal deficits    MEDICATIONS:  MEDICATIONS  (STANDING):  ALBUTerol/ipratropium (CFC free) Inhaler. 1 Puff(s) Inhalation every 4 hours  ampicillin/sulbactam  IVPB 1.5 Gram(s) IV Intermittent every 6 hours  aspirin  chewable 81 milliGRAM(s) Enteral Tube daily  atorvastatin 80 milliGRAM(s) Oral at bedtime  chlorhexidine 0.12% Liquid 15 milliLiter(s) Oral Mucosa two times a day  chlorhexidine 4% Liquid 1 Application(s) Topical daily  clopidogrel Tablet 75 milliGRAM(s) Oral daily  dextrose 5%. 1000 milliLiter(s) (75 mL/Hr) IV Continuous <Continuous>  docusate sodium 100 milliGRAM(s) Oral daily  fentaNYL   Infusion. 0.5 MICROgram(s)/kG/Hr (4.67 mL/Hr) IV Continuous <Continuous>  heparin  Injectable 5000 Unit(s) SubCutaneous every 8 hours  pantoprazole   Suspension 40 milliGRAM(s) Enteral Tube daily  propofol Infusion 10 MICROgram(s)/kG/Min (6.3 mL/Hr) IV Continuous <Continuous>  senna 1 Tablet(s) Oral at bedtime    MEDICATIONS  (PRN):  acetaminophen   Tablet .. 650 milliGRAM(s) Oral every 6 hours PRN Temp greater or equal to 38C (100.4F)  ALBUTerol/ipratropium (CFC free) Inhaler. 1 Puff(s) Inhalation every 4 hours PRN Wheezing      ALLERGIES:  Allergies    No Known Allergies    Intolerances        LABS:                        13.9   10.42 )-----------( 127      ( 16 Aug 2019 04:37 )             43.6     08-16    150<H>  |  105  |  49<H>  ----------------------------<  120<H>  3.8   |  28  |  0.9    Ca    8.9      16 Aug 2019 04:37    Troponin T, Serum: 0.13: Critical value: ng/mL (08.11.19 @ 04:29)  Troponin T, Serum: 1.01: Critical value: ng/mL (08.14.19 @ 04:30)  Troponin T, Serum: 1.42: Critical value: ng/mL (08.16.19 @ 04:37)    < from: Xray Chest 1 View- PORTABLE-Routine (08.16.19 @ 05:12) >  Impression:      No radiographic evidence of acute cardiopulmonary disease.    < end of copied text >          ASSESSMENT:    PLAN:

## 2019-08-17 LAB
ANION GAP SERPL CALC-SCNC: 11 MMOL/L — SIGNIFICANT CHANGE UP (ref 7–14)
BASOPHILS # BLD AUTO: 0.01 K/UL — SIGNIFICANT CHANGE UP (ref 0–0.2)
BASOPHILS NFR BLD AUTO: 0.1 % — SIGNIFICANT CHANGE UP (ref 0–1)
BUN SERPL-MCNC: 35 MG/DL — HIGH (ref 10–20)
CALCIUM SERPL-MCNC: 9.1 MG/DL — SIGNIFICANT CHANGE UP (ref 8.5–10.1)
CHLORIDE SERPL-SCNC: 103 MMOL/L — SIGNIFICANT CHANGE UP (ref 98–110)
CO2 SERPL-SCNC: 30 MMOL/L — SIGNIFICANT CHANGE UP (ref 17–32)
CREAT SERPL-MCNC: 0.8 MG/DL — SIGNIFICANT CHANGE UP (ref 0.7–1.5)
EOSINOPHIL # BLD AUTO: 0.33 K/UL — SIGNIFICANT CHANGE UP (ref 0–0.7)
EOSINOPHIL NFR BLD AUTO: 2.7 % — SIGNIFICANT CHANGE UP (ref 0–8)
GLUCOSE BLDC GLUCOMTR-MCNC: 127 MG/DL — HIGH (ref 70–99)
GLUCOSE BLDC GLUCOMTR-MCNC: 130 MG/DL — HIGH (ref 70–99)
GLUCOSE BLDC GLUCOMTR-MCNC: 183 MG/DL — HIGH (ref 70–99)
GLUCOSE SERPL-MCNC: 143 MG/DL — HIGH (ref 70–99)
HCT VFR BLD CALC: 44 % — SIGNIFICANT CHANGE UP (ref 42–52)
HGB BLD-MCNC: 13.9 G/DL — LOW (ref 14–18)
IMM GRANULOCYTES NFR BLD AUTO: 0.5 % — HIGH (ref 0.1–0.3)
LYMPHOCYTES # BLD AUTO: 0.57 K/UL — LOW (ref 1.2–3.4)
LYMPHOCYTES # BLD AUTO: 4.6 % — LOW (ref 20.5–51.1)
MCHC RBC-ENTMCNC: 29.1 PG — SIGNIFICANT CHANGE UP (ref 27–31)
MCHC RBC-ENTMCNC: 31.6 G/DL — LOW (ref 32–37)
MCV RBC AUTO: 92.1 FL — SIGNIFICANT CHANGE UP (ref 80–94)
MONOCYTES # BLD AUTO: 1.09 K/UL — HIGH (ref 0.1–0.6)
MONOCYTES NFR BLD AUTO: 8.8 % — SIGNIFICANT CHANGE UP (ref 1.7–9.3)
NEUTROPHILS # BLD AUTO: 10.28 K/UL — HIGH (ref 1.4–6.5)
NEUTROPHILS NFR BLD AUTO: 83.3 % — HIGH (ref 42.2–75.2)
NRBC # BLD: 0 /100 WBCS — SIGNIFICANT CHANGE UP (ref 0–0)
PLATELET # BLD AUTO: 131 K/UL — SIGNIFICANT CHANGE UP (ref 130–400)
POTASSIUM SERPL-MCNC: 4 MMOL/L — SIGNIFICANT CHANGE UP (ref 3.5–5)
POTASSIUM SERPL-SCNC: 4 MMOL/L — SIGNIFICANT CHANGE UP (ref 3.5–5)
RBC # BLD: 4.78 M/UL — SIGNIFICANT CHANGE UP (ref 4.7–6.1)
RBC # FLD: 13.3 % — SIGNIFICANT CHANGE UP (ref 11.5–14.5)
SODIUM SERPL-SCNC: 144 MMOL/L — SIGNIFICANT CHANGE UP (ref 135–146)
TROPONIN T SERPL-MCNC: 0.66 NG/ML — CRITICAL HIGH
WBC # BLD: 12.34 K/UL — HIGH (ref 4.8–10.8)
WBC # FLD AUTO: 12.34 K/UL — HIGH (ref 4.8–10.8)

## 2019-08-17 PROCEDURE — 71045 X-RAY EXAM CHEST 1 VIEW: CPT | Mod: 26

## 2019-08-17 PROCEDURE — 93010 ELECTROCARDIOGRAM REPORT: CPT

## 2019-08-17 RX ORDER — FUROSEMIDE 40 MG
40 TABLET ORAL
Refills: 0 | Status: DISCONTINUED | OUTPATIENT
Start: 2019-08-17 | End: 2019-08-23

## 2019-08-17 RX ADMIN — HEPARIN SODIUM 5000 UNIT(S): 5000 INJECTION INTRAVENOUS; SUBCUTANEOUS at 14:31

## 2019-08-17 RX ADMIN — Medication 60 MILLIGRAM(S): at 05:39

## 2019-08-17 RX ADMIN — Medication 100 MILLIGRAM(S): at 11:32

## 2019-08-17 RX ADMIN — AMPICILLIN SODIUM AND SULBACTAM SODIUM 100 GRAM(S): 250; 125 INJECTION, POWDER, FOR SUSPENSION INTRAMUSCULAR; INTRAVENOUS at 05:36

## 2019-08-17 RX ADMIN — CLOPIDOGREL BISULFATE 75 MILLIGRAM(S): 75 TABLET, FILM COATED ORAL at 11:32

## 2019-08-17 RX ADMIN — CHLORHEXIDINE GLUCONATE 1 APPLICATION(S): 213 SOLUTION TOPICAL at 05:39

## 2019-08-17 RX ADMIN — AMPICILLIN SODIUM AND SULBACTAM SODIUM 100 GRAM(S): 250; 125 INJECTION, POWDER, FOR SUSPENSION INTRAMUSCULAR; INTRAVENOUS at 00:26

## 2019-08-17 RX ADMIN — AMPICILLIN SODIUM AND SULBACTAM SODIUM 100 GRAM(S): 250; 125 INJECTION, POWDER, FOR SUSPENSION INTRAMUSCULAR; INTRAVENOUS at 17:05

## 2019-08-17 RX ADMIN — HEPARIN SODIUM 5000 UNIT(S): 5000 INJECTION INTRAVENOUS; SUBCUTANEOUS at 05:40

## 2019-08-17 RX ADMIN — Medication 81 MILLIGRAM(S): at 11:32

## 2019-08-17 RX ADMIN — SENNA PLUS 1 TABLET(S): 8.6 TABLET ORAL at 21:28

## 2019-08-17 RX ADMIN — Medication 40 MILLIGRAM(S): at 17:06

## 2019-08-17 RX ADMIN — AMPICILLIN SODIUM AND SULBACTAM SODIUM 100 GRAM(S): 250; 125 INJECTION, POWDER, FOR SUSPENSION INTRAMUSCULAR; INTRAVENOUS at 11:34

## 2019-08-17 RX ADMIN — PANTOPRAZOLE SODIUM 40 MILLIGRAM(S): 20 TABLET, DELAYED RELEASE ORAL at 11:32

## 2019-08-17 RX ADMIN — ATORVASTATIN CALCIUM 80 MILLIGRAM(S): 80 TABLET, FILM COATED ORAL at 21:28

## 2019-08-17 RX ADMIN — HEPARIN SODIUM 5000 UNIT(S): 5000 INJECTION INTRAVENOUS; SUBCUTANEOUS at 21:28

## 2019-08-17 NOTE — CHART NOTE - NSCHARTNOTEFT_GEN_A_CORE
Registered Dietitian Follow-Up     Patient Profile Reviewed                           Yes [x]   No []     Nutrition History Previously Obtained        Yes []  No [x]  Patient now extubated and was able to provide information      Pertinent Subjective Information: Patient reports eating less than half of trays currently and being a picky eater. Reports appetite good PTA and following regular diet, reports not being diabetic (question accuracy). NKFA/preferences. No previous supplementation. No chewing/swallowing problems. Reports large BM 8/16. Reports being 209# 1 year ago (5.7% wt loss  x 1 year which is not significant). Agreeable to glucerna supplementation.      Pertinent Medical Interventions: Patient extubated 8/16 and advanced to DASH/TLC , CCD (no snacks) diet, TF discontinued.      Diet order: DASH/TLC, CCD, no snacks      Anthropometrics:  - Ht: 66 inches  - Wt: 89.5 kg on 8/17  - %wt change: Noted 93.4 kg on admit 8/11, indicating a 4% wt loss x ~1 week (significant)   - BMI: 31.8   - IBW: 142#     Pertinent Lab Data: 8/17: H/H 13.9/44, BUN 35, glucose 143      Pertinent Meds: plavix, aspirin, heparin, abx, IVF, lasix, tylenol, colace, senna, atorvastatin, protonix      Physical Findings:  - Appearance: alert and oriented, extubated 8/16, no signs of fat/muscle wasting   - GI function: large BM 8/16   - Oral/Mouth cavity: no chewing/swallowing problems per patient   - Skin: intact      Nutrition Requirements  Weight Used: 89.5 kg      Estimated Energy Needs    Continue []  Adjust [x]  Adjusted Energy Recommendations: MSJ x 1.0-1.1 due to obesity= 0064-6613 kcal/day         Estimated Protein Needs    Continue []  Adjust [x]  Adjusted Protein Recommendations: 1.0-1.2 g/kg IBW due to obesity= 64-77 g/day         Estimated Fluid Needs        Continue [x]  Adjust []  Adjusted Fluid Recommendations: per CCU team      Nutrient Intake: Patient likely not meeting estimated needs. Reports to be eating less than half of trays and documented 30% intake x 2 meals today.         [x] Previous Nutrition Diagnosis: Inadequate enteral nutrition infusion no longer appropriate at this time. See new PES below.      Nutrition Diagnostic #1  Problem: Inadequate oral intake   Etiology: related to decreased appetite   Statement: as evidenced by patient report/30% intake of 2 trays documented today.      Nutrition Intervention: PO intake encouraged with patient. Recommend Glucerna BID.      Goal/Expected Outcome: Patient to consume >75% of trays and recommended supplements upon f/u in 3 days.      Indicator/Monitoring: RD to monitor diet order, energy intake, body composition (wt trends).

## 2019-08-17 NOTE — PROGRESS NOTE ADULT - ASSESSMENT
IMPRESSION:    Acute respiratory failure/ CHF/ 20 TO 25% s/p extubation  RLL PNA/ aspiration/ cx neg  COPD/ GOSIA      PLAN:    CNS: SAT.     HEENT: Oral care    PULMONARY:  HOB @ 45 degrees.  bipap at night    CARDIOVASCULAR: Cardiology f/up. Continue ASA, Plavix. po lasix hold fluid    GI: GI prophylaxis.  OG Feeding.     RENAL:  Follow up lytes.  Correct as needed. dc ivf    INFECTIOUS DISEASE: Follow up cultures. dc abx    HEMATOLOGICAL:  DVT prophylaxis.    ENDOCRINE:  Follow up FS.    MUSCULOSKELETAL: Bedrest.    OOB to chair  cardio f/up  dyllan payan

## 2019-08-17 NOTE — PROGRESS NOTE ADULT - SUBJECTIVE AND OBJECTIVE BOX
OVERNIGHT EVENTS: s/p extubation, off bipap, eating    Vital Signs Last 24 Hrs  T(C): 36.9 (17 Aug 2019 08:00), Max: 37.6 (17 Aug 2019 00:00)  T(F): 98.5 (17 Aug 2019 08:00), Max: 99.7 (17 Aug 2019 00:00)  HR: 76 (17 Aug 2019 08:00) (62 - 78)  BP: 122/68 (17 Aug 2019 08:00) (104/67 - 130/73)  BP(mean): 95 (17 Aug 2019 08:00) (78 - 107)  RR: 21 (17 Aug 2019 08:00) (14 - 28)  SpO2: 97% (17 Aug 2019 08:00) (95% - 99%)    PHYSICAL EXAMINATION:    GENERAL: axox3, follows commands    HEENT: Head is normocephalic and atraumatic. Extraocular muscles are intact. Mucous membranes are moist.    NECK: Supple.    LUNGS: dec bs both bases    HEART: Regular rate and rhythm without murmur.    ABDOMEN: Soft, nontender, and nondistended.      EXTREMITIES: Without any cyanosis, clubbing, rash, lesions or edema.    NEUROLOGIC: Grossly intact.    SKIN: No ulceration or induration present.      LABS:                        13.9   12.34 )-----------( 131      ( 17 Aug 2019 04:36 )             44.0     08-17    144  |  103  |  35<H>  ----------------------------<  143<H>  4.0   |  30  |  0.8    Ca    9.1      17 Aug 2019 04:36          ABG - ( 16 Aug 2019 14:48 )  pH, Arterial: 7.45  pH, Blood: x     /  pCO2: 54    /  pO2: 100   / HCO3: 37    / Base Excess: 11.3  /  SaO2: 98                CARDIAC MARKERS ( 17 Aug 2019 04:36 )  x     / 0.66 ng/mL / x     / x     / x      CARDIAC MARKERS ( 16 Aug 2019 04:37 )  x     / 1.42 ng/mL / x     / x     / x            Serum Pro-Brain Natriuretic Peptide: 391 pg/mL (08-16-19 @ 04:37)            08-16-19 @ 07:01  -  08-17-19 @ 07:00  --------------------------------------------------------  IN: 3405 mL / OUT: 3204 mL / NET: 201 mL    08-17-19 @ 07:01  -  08-17-19 @ 08:26  --------------------------------------------------------  IN: 175 mL / OUT: 350 mL / NET: -175 mL        MICROBIOLOGY:  Culture Results:   No growth to date. (08-13 @ 11:58)      MEDICATIONS  (STANDING):  ALBUTerol/ipratropium (CFC free) Inhaler. 1 Puff(s) Inhalation every 4 hours  ampicillin/sulbactam  IVPB 1.5 Gram(s) IV Intermittent every 6 hours  aspirin  chewable 81 milliGRAM(s) Enteral Tube daily  atorvastatin 80 milliGRAM(s) Oral at bedtime  chlorhexidine 4% Liquid 1 Application(s) Topical daily  clopidogrel Tablet 75 milliGRAM(s) Oral daily  dextrose 5%. 1000 milliLiter(s) (75 mL/Hr) IV Continuous <Continuous>  docusate sodium 100 milliGRAM(s) Oral daily  furosemide   Injectable 60 milliGRAM(s) IV Push daily  heparin  Injectable 5000 Unit(s) SubCutaneous every 8 hours  pantoprazole   Suspension 40 milliGRAM(s) Enteral Tube daily  senna 1 Tablet(s) Oral at bedtime    MEDICATIONS  (PRN):  acetaminophen   Tablet .. 650 milliGRAM(s) Oral every 6 hours PRN Temp greater or equal to 38C (100.4F)  ALBUTerol/ipratropium (CFC free) Inhaler. 1 Puff(s) Inhalation every 4 hours PRN Wheezing      RADIOLOGY & ADDITIONAL STUDIES:

## 2019-08-18 LAB
ALBUMIN SERPL ELPH-MCNC: 3.7 G/DL — SIGNIFICANT CHANGE UP (ref 3.5–5.2)
ALP SERPL-CCNC: 58 U/L — SIGNIFICANT CHANGE UP (ref 30–115)
ALT FLD-CCNC: 27 U/L — SIGNIFICANT CHANGE UP (ref 0–41)
ANION GAP SERPL CALC-SCNC: 14 MMOL/L — SIGNIFICANT CHANGE UP (ref 7–14)
AST SERPL-CCNC: 21 U/L — SIGNIFICANT CHANGE UP (ref 0–41)
BASOPHILS # BLD AUTO: 0.01 K/UL — SIGNIFICANT CHANGE UP (ref 0–0.2)
BASOPHILS NFR BLD AUTO: 0.1 % — SIGNIFICANT CHANGE UP (ref 0–1)
BILIRUB SERPL-MCNC: 1.2 MG/DL — SIGNIFICANT CHANGE UP (ref 0.2–1.2)
BUN SERPL-MCNC: 36 MG/DL — HIGH (ref 10–20)
CALCIUM SERPL-MCNC: 9.5 MG/DL — SIGNIFICANT CHANGE UP (ref 8.5–10.1)
CHLORIDE SERPL-SCNC: 99 MMOL/L — SIGNIFICANT CHANGE UP (ref 98–110)
CK SERPL-CCNC: 160 U/L — SIGNIFICANT CHANGE UP (ref 0–225)
CO2 SERPL-SCNC: 29 MMOL/L — SIGNIFICANT CHANGE UP (ref 17–32)
CREAT SERPL-MCNC: 0.8 MG/DL — SIGNIFICANT CHANGE UP (ref 0.7–1.5)
CULTURE RESULTS: SIGNIFICANT CHANGE UP
EOSINOPHIL # BLD AUTO: 0.39 K/UL — SIGNIFICANT CHANGE UP (ref 0–0.7)
EOSINOPHIL NFR BLD AUTO: 3 % — SIGNIFICANT CHANGE UP (ref 0–8)
GLUCOSE BLDC GLUCOMTR-MCNC: 124 MG/DL — HIGH (ref 70–99)
GLUCOSE BLDC GLUCOMTR-MCNC: 128 MG/DL — HIGH (ref 70–99)
GLUCOSE BLDC GLUCOMTR-MCNC: 98 MG/DL — SIGNIFICANT CHANGE UP (ref 70–99)
GLUCOSE SERPL-MCNC: 120 MG/DL — HIGH (ref 70–99)
HCT VFR BLD CALC: 45.2 % — SIGNIFICANT CHANGE UP (ref 42–52)
HGB BLD-MCNC: 14.5 G/DL — SIGNIFICANT CHANGE UP (ref 14–18)
IMM GRANULOCYTES NFR BLD AUTO: 0.5 % — HIGH (ref 0.1–0.3)
LYMPHOCYTES # BLD AUTO: 0.78 K/UL — LOW (ref 1.2–3.4)
LYMPHOCYTES # BLD AUTO: 6.1 % — LOW (ref 20.5–51.1)
MAGNESIUM SERPL-MCNC: 2.3 MG/DL — SIGNIFICANT CHANGE UP (ref 1.8–2.4)
MCHC RBC-ENTMCNC: 28.8 PG — SIGNIFICANT CHANGE UP (ref 27–31)
MCHC RBC-ENTMCNC: 32.1 G/DL — SIGNIFICANT CHANGE UP (ref 32–37)
MCV RBC AUTO: 89.7 FL — SIGNIFICANT CHANGE UP (ref 80–94)
MONOCYTES # BLD AUTO: 1.11 K/UL — HIGH (ref 0.1–0.6)
MONOCYTES NFR BLD AUTO: 8.6 % — SIGNIFICANT CHANGE UP (ref 1.7–9.3)
NEUTROPHILS # BLD AUTO: 10.49 K/UL — HIGH (ref 1.4–6.5)
NEUTROPHILS NFR BLD AUTO: 81.7 % — HIGH (ref 42.2–75.2)
NRBC # BLD: 0 /100 WBCS — SIGNIFICANT CHANGE UP (ref 0–0)
PHOSPHATE SERPL-MCNC: 2.7 MG/DL — SIGNIFICANT CHANGE UP (ref 2.1–4.9)
PLATELET # BLD AUTO: 150 K/UL — SIGNIFICANT CHANGE UP (ref 130–400)
POTASSIUM SERPL-MCNC: 3.8 MMOL/L — SIGNIFICANT CHANGE UP (ref 3.5–5)
POTASSIUM SERPL-SCNC: 3.8 MMOL/L — SIGNIFICANT CHANGE UP (ref 3.5–5)
PROT SERPL-MCNC: 6.5 G/DL — SIGNIFICANT CHANGE UP (ref 6–8)
RBC # BLD: 5.04 M/UL — SIGNIFICANT CHANGE UP (ref 4.7–6.1)
RBC # FLD: 13.2 % — SIGNIFICANT CHANGE UP (ref 11.5–14.5)
SODIUM SERPL-SCNC: 142 MMOL/L — SIGNIFICANT CHANGE UP (ref 135–146)
SPECIMEN SOURCE: SIGNIFICANT CHANGE UP
TROPONIN T SERPL-MCNC: 0.51 NG/ML — CRITICAL HIGH
WBC # BLD: 12.84 K/UL — HIGH (ref 4.8–10.8)
WBC # FLD AUTO: 12.84 K/UL — HIGH (ref 4.8–10.8)

## 2019-08-18 PROCEDURE — 71045 X-RAY EXAM CHEST 1 VIEW: CPT | Mod: 26

## 2019-08-18 PROCEDURE — 93010 ELECTROCARDIOGRAM REPORT: CPT

## 2019-08-18 RX ADMIN — Medication 100 MILLIGRAM(S): at 12:28

## 2019-08-18 RX ADMIN — SENNA PLUS 1 TABLET(S): 8.6 TABLET ORAL at 22:29

## 2019-08-18 RX ADMIN — Medication 40 MILLIGRAM(S): at 07:03

## 2019-08-18 RX ADMIN — Medication 1 PUFF(S): at 07:48

## 2019-08-18 RX ADMIN — ATORVASTATIN CALCIUM 80 MILLIGRAM(S): 80 TABLET, FILM COATED ORAL at 22:29

## 2019-08-18 RX ADMIN — HEPARIN SODIUM 5000 UNIT(S): 5000 INJECTION INTRAVENOUS; SUBCUTANEOUS at 14:02

## 2019-08-18 RX ADMIN — CHLORHEXIDINE GLUCONATE 1 APPLICATION(S): 213 SOLUTION TOPICAL at 07:03

## 2019-08-18 RX ADMIN — HEPARIN SODIUM 5000 UNIT(S): 5000 INJECTION INTRAVENOUS; SUBCUTANEOUS at 22:29

## 2019-08-18 RX ADMIN — HEPARIN SODIUM 5000 UNIT(S): 5000 INJECTION INTRAVENOUS; SUBCUTANEOUS at 07:03

## 2019-08-18 RX ADMIN — PANTOPRAZOLE SODIUM 40 MILLIGRAM(S): 20 TABLET, DELAYED RELEASE ORAL at 12:28

## 2019-08-18 RX ADMIN — Medication 40 MILLIGRAM(S): at 17:37

## 2019-08-18 RX ADMIN — CLOPIDOGREL BISULFATE 75 MILLIGRAM(S): 75 TABLET, FILM COATED ORAL at 12:28

## 2019-08-18 RX ADMIN — Medication 81 MILLIGRAM(S): at 12:28

## 2019-08-18 NOTE — PROGRESS NOTE ADULT - SUBJECTIVE AND OBJECTIVE BOX
Patient is a 59y old  Male who presents with a chief complaint of Chest pain (18 Aug 2019 03:33)        Over Night Events:        ROS:  See HPI    PHYSICAL EXAM    ICU Vital Signs Last 24 Hrs  T(C): 36.4 (18 Aug 2019 08:00), Max: 37.7 (18 Aug 2019 00:00)  T(F): 97.6 (18 Aug 2019 08:00), Max: 99.8 (18 Aug 2019 00:00)  HR: 66 (18 Aug 2019 08:00) (60 - 74)  BP: 122/80 (18 Aug 2019 08:00) (104/72 - 142/82)  BP(mean): 93 (18 Aug 2019 08:00) (81 - 106)  ABP: --  ABP(mean): --  RR: 28 (18 Aug 2019 08:00) (16 - 30)  SpO2: 96% (18 Aug 2019 08:00) (93% - 97%)      General:  HEENT: JOHANA             Lymphatic system: No cervical LN   Lungs: Bilateral BS  Cardiovascular: Regular   Gastrointestinal: Soft, Positive BS  Extremities: No clubbing.  Moves extremities.  Full Range of motion   Skin: Warm, intact  Neurological: No motor or sensory deficit       08-17-19 @ 07:01  -  08-18-19 @ 07:00  --------------------------------------------------------  IN:    dextrose 5%.: 75 mL    IV PiggyBack: 100 mL    Oral Fluid: 540 mL  Total IN: 715 mL    OUT:    Indwelling Catheter - Urethral: 475 mL    Voided: 400 mL  Total OUT: 875 mL    Total NET: -160 mL      08-18-19 @ 07:01  -  08-18-19 @ 09:18  --------------------------------------------------------  IN:    Oral Fluid: 240 mL  Total IN: 240 mL    OUT:    Voided: 200 mL  Total OUT: 200 mL    Total NET: 40 mL          LABS:                            14.5   12.84 )-----------( 150      ( 18 Aug 2019 04:27 )             45.2                                               08-18    142  |  99  |  36<H>  ----------------------------<  120<H>  3.8   |  29  |  0.8    Ca    9.5      18 Aug 2019 04:27  Phos  2.7     08-18  Mg     2.3     08-18    TPro  6.5  /  Alb  3.7  /  TBili  1.2  /  DBili  x   /  AST  21  /  ALT  27  /  AlkPhos  58  08-18                                                 CARDIAC MARKERS ( 18 Aug 2019 04:27 )  x     / 0.51 ng/mL / 160 U/L / x     / x      CARDIAC MARKERS ( 17 Aug 2019 04:36 )  x     / 0.66 ng/mL / x     / x     / x                                                LIVER FUNCTIONS - ( 18 Aug 2019 04:27 )  Alb: 3.7 g/dL / Pro: 6.5 g/dL / ALK PHOS: 58 U/L / ALT: 27 U/L / AST: 21 U/L / GGT: x                                                                                                                                   ABG - ( 16 Aug 2019 14:48 )  pH, Arterial: 7.45  pH, Blood: x     /  pCO2: 54    /  pO2: 100   / HCO3: 37    / Base Excess: 11.3  /  SaO2: 98                  MEDICATIONS  (STANDING):  ALBUTerol/ipratropium (CFC free) Inhaler. 1 Puff(s) Inhalation every 4 hours  aspirin  chewable 81 milliGRAM(s) Enteral Tube daily  atorvastatin 80 milliGRAM(s) Oral at bedtime  chlorhexidine 4% Liquid 1 Application(s) Topical daily  clopidogrel Tablet 75 milliGRAM(s) Oral daily  docusate sodium 100 milliGRAM(s) Oral daily  furosemide    Tablet 40 milliGRAM(s) Oral two times a day  heparin  Injectable 5000 Unit(s) SubCutaneous every 8 hours  pantoprazole   Suspension 40 milliGRAM(s) Enteral Tube daily  senna 1 Tablet(s) Oral at bedtime    MEDICATIONS  (PRN):  acetaminophen   Tablet .. 650 milliGRAM(s) Oral every 6 hours PRN Temp greater or equal to 38C (100.4F)  ALBUTerol/ipratropium (CFC free) Inhaler. 1 Puff(s) Inhalation every 4 hours PRN Wheezing      Xrays:                                                                                     ECHO Patient is a 59y old  Male who presents with a chief complaint of Chest pain (18 Aug 2019 03:33)        Over Night Events: doing well, on NC, BIPAP at night        ROS:  See HPI    PHYSICAL EXAM    ICU Vital Signs Last 24 Hrs  T(C): 36.4 (18 Aug 2019 08:00), Max: 37.7 (18 Aug 2019 00:00)  T(F): 97.6 (18 Aug 2019 08:00), Max: 99.8 (18 Aug 2019 00:00)  HR: 66 (18 Aug 2019 08:00) (60 - 74)  BP: 122/80 (18 Aug 2019 08:00) (104/72 - 142/82)  BP(mean): 93 (18 Aug 2019 08:00) (81 - 106)  RR: 28 (18 Aug 2019 08:00) (16 - 30)  SpO2: 96% (18 Aug 2019 08:00) (93% - 97%)      General:  HEENT: JOHANA             Lymphatic system: No cervical LN   Lungs: Bilateral BS, dec bs l side  Cardiovascular: Regular   Gastrointestinal: Soft, Positive BS  Extremities: No clubbing.  Moves extremities.  Full Range of motion   Skin: Warm, intact  Neurological: No motor or sensory deficit       08-17-19 @ 07:01  -  08-18-19 @ 07:00  --------------------------------------------------------  IN:    dextrose 5%.: 75 mL    IV PiggyBack: 100 mL    Oral Fluid: 540 mL  Total IN: 715 mL    OUT:    Indwelling Catheter - Urethral: 475 mL    Voided: 400 mL  Total OUT: 875 mL    Total NET: -160 mL      08-18-19 @ 07:01  -  08-18-19 @ 09:18  --------------------------------------------------------  IN:    Oral Fluid: 240 mL  Total IN: 240 mL    OUT:    Voided: 200 mL  Total OUT: 200 mL    Total NET: 40 mL          LABS:                            14.5   12.84 )-----------( 150      ( 18 Aug 2019 04:27 )             45.2                                               08-18    142  |  99  |  36<H>  ----------------------------<  120<H>  3.8   |  29  |  0.8    Ca    9.5      18 Aug 2019 04:27  Phos  2.7     08-18  Mg     2.3     08-18    TPro  6.5  /  Alb  3.7  /  TBili  1.2  /  DBili  x   /  AST  21  /  ALT  27  /  AlkPhos  58  08-18                                                 CARDIAC MARKERS ( 18 Aug 2019 04:27 )  x     / 0.51 ng/mL / 160 U/L / x     / x      CARDIAC MARKERS ( 17 Aug 2019 04:36 )  x     / 0.66 ng/mL / x     / x     / x                                                LIVER FUNCTIONS - ( 18 Aug 2019 04:27 )  Alb: 3.7 g/dL / Pro: 6.5 g/dL / ALK PHOS: 58 U/L / ALT: 27 U/L / AST: 21 U/L / GGT: x                                                                                                                                   ABG - ( 16 Aug 2019 14:48 )  pH, Arterial: 7.45  pH, Blood: x     /  pCO2: 54    /  pO2: 100   / HCO3: 37    / Base Excess: 11.3  /  SaO2: 98                  MEDICATIONS  (STANDING):  ALBUTerol/ipratropium (CFC free) Inhaler. 1 Puff(s) Inhalation every 4 hours  aspirin  chewable 81 milliGRAM(s) Enteral Tube daily  atorvastatin 80 milliGRAM(s) Oral at bedtime  chlorhexidine 4% Liquid 1 Application(s) Topical daily  clopidogrel Tablet 75 milliGRAM(s) Oral daily  docusate sodium 100 milliGRAM(s) Oral daily  furosemide    Tablet 40 milliGRAM(s) Oral two times a day  heparin  Injectable 5000 Unit(s) SubCutaneous every 8 hours  pantoprazole   Suspension 40 milliGRAM(s) Enteral Tube daily  senna 1 Tablet(s) Oral at bedtime    MEDICATIONS  (PRN):  acetaminophen   Tablet .. 650 milliGRAM(s) Oral every 6 hours PRN Temp greater or equal to 38C (100.4F)  ALBUTerol/ipratropium (CFC free) Inhaler. 1 Puff(s) Inhalation every 4 hours PRN Wheezing      Xrays:  reviewed

## 2019-08-18 NOTE — SWALLOW BEDSIDE ASSESSMENT ADULT - SWALLOW EVAL: DIAGNOSIS
toleration observed for puree, thins, and soft consistency. regular not trialed 2/2 pt without dentition

## 2019-08-18 NOTE — PROGRESS NOTE ADULT - ASSESSMENT
A 58 yo M (named Mr. Dimitry Frazier) w/ PMH of COPD (smoke 4 packs/day for over 30 years), CAD s/p CABG, DM and hx of illicit drug use presents to the ED unresponsive.          #I spoke to cardiology office at the [facility] Dr. Darius Coe waiting for them to fax medical reports. ,       #RS failure: improving, s/p extubation  -cardiology advice to hold on Lasix as no signs of fluid overload with low levels of BNP, but as pt has low EF with unknwon previous Hx, will keep lasix PO BID  -DC unasyn, completed 7 days, -ve cultures  -off steroid   -albuterol/ipratropium q4h  -Monitor Is & Os      #CHF AND ACS, EF 20-25%  -c/w ASA, plavix and statin  -restart BB and ACEI when BP permits, currently off pressors   -monitor troponin  -cardio Will consider cath once more stable.  -waiting reports from [facility]    #DVT ppx  -UFH    #DM: monitor FS    #GI prophylaxis. protonix,  OG Feeding

## 2019-08-18 NOTE — CHART NOTE - NSCHARTNOTEFT_GEN_A_CORE
this is 59 yr old M w/ PMH of COPD (smoke 4 packs/day for over 30 years), CAD s/p CABG, DM and hx of illicit drug use presents   to the ED unresponsive. 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   shortly after, pt asked for ambulance and was intubated on site. In the ED, pt started on treatment of COPD and CXR showed pulmonary edema, CTA ruled out PE  Pt sent to CCU for management of RS failure.  in the CCU: he was treated for COPD, HF with Lasix, solumedrol and Nebs for COPD and Unasyn for possible aspiration, he has been improving and was extubated on 8/16, has been stable in RA,  blood and urine cultures -ve,   he had 2 Echos showed EF of 20-25%, grade 2 diastolic dysfunction and mild pulmonary HTN, his troponin noticed to be elevated but BNP were < 700,  has been evaluated by cardiology Dr. Eason who suggested hold Lasix as it dosent look like acute HF, although critical team continued treatment with lasix, Dr Calderón will evaluate him for possible catheterization,  I tried to get his medical reports' from [facility], Dr. Darius Coe but didn't receive any fax from them  their office number is [phone], Fax: [phone].       Plans:    -transfer to   -f/u with cardiology Dr Eason, possible to go for cath to r/u ACS event attributed to HF  -cont lasix 40 mg BID PO  -c/w Plavix, ASA and statin  -will need beta blocker   -call [facility] for further detail about his PMHx, Dr. Darius Coe, office: [phone], Fax: [phone].  -regular diet  -DVT prohylaxis

## 2019-08-18 NOTE — PROGRESS NOTE ADULT - SUBJECTIVE AND OBJECTIVE BOX
Patient is a 59y old  Male who presents with a chief complaint of Chest pain (17 Aug 2019 08:25)      OVERNIGHT EVENTS: extubated, off pressors, tolerates PO well,     SUBJECTIVE / INTERVAL HPI: Patient seen and examined at bedside.     VITAL SIGNS:  Vital Signs Last 24 Hrs  T(C): 37.7 (18 Aug 2019 00:00), Max: 37.7 (18 Aug 2019 00:00)  T(F): 99.8 (18 Aug 2019 00:00), Max: 99.8 (18 Aug 2019 00:00)  HR: 66 (18 Aug 2019 02:13) (64 - 76)  BP: 118/76 (18 Aug 2019 02:00) (104/72 - 142/82)  BP(mean): 93 (18 Aug 2019 02:00) (81 - 106)  RR: 28 (18 Aug 2019 02:00) (16 - 30)  SpO2: 96% (18 Aug 2019 02:13) (95% - 98%)    PHYSICAL EXAM:    General: WDWN  HEENT: NC/AT; PERRL, clear conjunctiva  Neck: supple  Cardiovascular: +S1/S2; RRR  Respiratory: CTA b/l; no W/R/R  Gastrointestinal: soft, NT/ND; +BSx4  Extremities: WWP; 2+ peripheral pulses; no edema   Neurological: AAOx3; no focal deficits    MEDICATIONS:  MEDICATIONS  (STANDING):  ALBUTerol/ipratropium (CFC free) Inhaler. 1 Puff(s) Inhalation every 4 hours  aspirin  chewable 81 milliGRAM(s) Enteral Tube daily  atorvastatin 80 milliGRAM(s) Oral at bedtime  chlorhexidine 4% Liquid 1 Application(s) Topical daily  clopidogrel Tablet 75 milliGRAM(s) Oral daily  docusate sodium 100 milliGRAM(s) Oral daily  furosemide    Tablet 40 milliGRAM(s) Oral two times a day  heparin  Injectable 5000 Unit(s) SubCutaneous every 8 hours  pantoprazole   Suspension 40 milliGRAM(s) Enteral Tube daily  senna 1 Tablet(s) Oral at bedtime    MEDICATIONS  (PRN):  acetaminophen   Tablet .. 650 milliGRAM(s) Oral every 6 hours PRN Temp greater or equal to 38C (100.4F)  ALBUTerol/ipratropium (CFC free) Inhaler. 1 Puff(s) Inhalation every 4 hours PRN Wheezing      ALLERGIES:  Allergies    No Known Allergies    Intolerances        LABS:                        13.9   12.34 )-----------( 131      ( 17 Aug 2019 04:36 )             44.0     08-17    144  |  103  |  35<H>  ----------------------------<  143<H>  4.0   |  30  |  0.8    Ca    9.1      17 Aug 2019 04:36          CAPILLARY BLOOD GLUCOSE      POCT Blood Glucose.: 127 mg/dL (17 Aug 2019 22:15)  < from: Transthoracic Echocardiogram (08.14.19 @ 14:08) >  Summary:   1. Left ventricular ejection fraction, by visual estimation,is 20 to   25%.   2. Spectral Doppler shows pseudonormal pattern of left ventricular   myocardial filling (Grade II diastolic dysfunction).   3. Mitral annular calcification.    < end of copied text >  < from: Xray Chest 1 View- PORTABLE-Routine (08.17.19 @ 04:56) >    Impression:      No radiographic evidence of acute cardiopulmonary disease.    < end of copied text >  Culture - Blood (08.13.19 @ 11:58)    Specimen Source: .Blood None    Culture Results:   No growth to date.  Culture - Sputum (08.11.19 @ 22:40)    Gram Stain:   Moderate polymorphonuclear leukocytes per low power field  Rare Squamous epithelial cells per low power field  No organisms seen per oil power field    Specimen Source: .Sputum Sputum    Culture Results:   No growth at 48 hours  < from: CT Angio Chest w/ IV Cont (08.11.19 @ 00:00) >  IMPRESSION:     No CTA evidence of acute pulmonary embolus.    Reflux of intravenous contrast into the hepatic veins. This can be seen   with right heart dysfunction.    Atelectasis of the right lower lobe. Debris within right mainstem   bronchus.    < end of copied text >

## 2019-08-19 LAB
DRUG SCREEN, SERUM: SIGNIFICANT CHANGE UP
GLUCOSE BLDC GLUCOMTR-MCNC: 107 MG/DL — HIGH (ref 70–99)
GLUCOSE BLDC GLUCOMTR-MCNC: 125 MG/DL — HIGH (ref 70–99)

## 2019-08-19 PROCEDURE — 99232 SBSQ HOSP IP/OBS MODERATE 35: CPT

## 2019-08-19 PROCEDURE — 99233 SBSQ HOSP IP/OBS HIGH 50: CPT

## 2019-08-19 RX ORDER — ISOSORBIDE DINITRATE 5 MG/1
10 TABLET ORAL
Refills: 0 | Status: DISCONTINUED | OUTPATIENT
Start: 2019-08-19 | End: 2019-08-23

## 2019-08-19 RX ADMIN — Medication 40 MILLIGRAM(S): at 06:55

## 2019-08-19 RX ADMIN — HEPARIN SODIUM 5000 UNIT(S): 5000 INJECTION INTRAVENOUS; SUBCUTANEOUS at 06:55

## 2019-08-19 RX ADMIN — Medication 100 MILLIGRAM(S): at 12:19

## 2019-08-19 RX ADMIN — Medication 1 PUFF(S): at 08:38

## 2019-08-19 RX ADMIN — Medication 40 MILLIGRAM(S): at 17:58

## 2019-08-19 RX ADMIN — ISOSORBIDE DINITRATE 10 MILLIGRAM(S): 5 TABLET ORAL at 17:58

## 2019-08-19 RX ADMIN — HEPARIN SODIUM 5000 UNIT(S): 5000 INJECTION INTRAVENOUS; SUBCUTANEOUS at 22:01

## 2019-08-19 RX ADMIN — ATORVASTATIN CALCIUM 80 MILLIGRAM(S): 80 TABLET, FILM COATED ORAL at 22:01

## 2019-08-19 RX ADMIN — HEPARIN SODIUM 5000 UNIT(S): 5000 INJECTION INTRAVENOUS; SUBCUTANEOUS at 15:30

## 2019-08-19 RX ADMIN — PANTOPRAZOLE SODIUM 40 MILLIGRAM(S): 20 TABLET, DELAYED RELEASE ORAL at 12:15

## 2019-08-19 RX ADMIN — SENNA PLUS 1 TABLET(S): 8.6 TABLET ORAL at 22:01

## 2019-08-19 RX ADMIN — CLOPIDOGREL BISULFATE 75 MILLIGRAM(S): 75 TABLET, FILM COATED ORAL at 12:15

## 2019-08-19 RX ADMIN — Medication 81 MILLIGRAM(S): at 12:15

## 2019-08-19 NOTE — PROGRESS NOTE ADULT - SUBJECTIVE AND OBJECTIVE BOX
Hospital Day:  8d    Subjective:    Patient is a 59y old  Male who presents with a chief complaint of Chest pain (18 Aug 2019 09:18)      Past Medical Hx:   COPD (chronic obstructive pulmonary disease)  DM (diabetes mellitus)  CAD (coronary artery disease) of bypass graft    Past Sx:  S/P CABG (coronary artery bypass graft)    Allergies:  No Known Allergies    Current Meds:   Standng Meds:  ALBUTerol/ipratropium (CFC free) Inhaler. 1 Puff(s) Inhalation every 4 hours  aspirin  chewable 81 milliGRAM(s) Enteral Tube daily  atorvastatin 80 milliGRAM(s) Oral at bedtime  chlorhexidine 4% Liquid 1 Application(s) Topical daily  clopidogrel Tablet 75 milliGRAM(s) Oral daily  docusate sodium 100 milliGRAM(s) Oral daily  furosemide    Tablet 40 milliGRAM(s) Oral two times a day  heparin  Injectable 5000 Unit(s) SubCutaneous every 8 hours  pantoprazole   Suspension 40 milliGRAM(s) Enteral Tube daily  senna 1 Tablet(s) Oral at bedtime    PRN Meds:  acetaminophen   Tablet .. 650 milliGRAM(s) Oral every 6 hours PRN Temp greater or equal to 38C (100.4F)  ALBUTerol/ipratropium (CFC free) Inhaler. 1 Puff(s) Inhalation every 4 hours PRN Wheezing    HOME MEDICATIONS:  atorvastatin 80 mg oral tablet: 1 tab(s) orally once a day  carvedilol 25 mg oral tablet: 1 tab(s) orally 2 times a day  furosemide 40 mg oral tablet: 1 tab(s) orally once a day  isosorbide mononitrate 120 mg oral tablet, extended release: 1 tab(s) orally once a day (in the morning)  lisinopril 40 mg oral tablet: 1 tab(s) orally once a day  Low Dose ASA 81 mg oral tablet: 1 tab(s) orally once a day      Vital Signs:   T(F): 97.4 (08-18-19 @ 21:16), Max: 98.2 (08-18-19 @ 16:41)  HR: 69 (08-18-19 @ 21:16) (66 - 78)  BP: 128/75 (08-18-19 @ 21:16) (121/84 - 129/76)  RR: 18 (08-18-19 @ 21:16) (18 - 28)  SpO2: 96% (08-18-19 @ 18:20) (95% - 97%)      08-17-19 @ 07:01  -  08-18-19 @ 07:00  --------------------------------------------------------  IN: 715 mL / OUT: 875 mL / NET: -160 mL    08-18-19 @ 07:01  -  08-19-19 @ 06:20  --------------------------------------------------------  IN: 240 mL / OUT: 400 mL / NET: -160 mL        Physical Exam:   GENERAL: NAD  HEENT: NCAT  CHEST/LUNG: CTAB  HEART: Regular rate and rhythm; s1 s2 appreciated, No murmurs, rubs, or gallops  ABDOMEN: Soft, Nontender, Nondistended; Bowel sounds present  EXTREMITIES: No LE edema b/l  NERVOUS SYSTEM:  Alert & Oriented X3        Labs:                         14.5   12.84 )-----------( 150      ( 18 Aug 2019 04:27 )             45.2       18 Aug 2019 04:27    142    |  99     |  36     ----------------------------<  120    3.8     |  29     |  0.8      Ca    9.5        18 Aug 2019 04:27  Phos  2.7       18 Aug 2019 04:27  Mg     2.3       18 Aug 2019 04:27    TPro  6.5    /  Alb  3.7    /  TBili  1.2    /  DBili  x      /  AST  21     /  ALT  27     /  AlkPhos  58     18 Aug 2019 04:27            Serum Pro-Brain Natriuretic Peptide: 391 pg/mL (08-16-19 @ 04:37)  Serum Pro-Brain Natriuretic Peptide: 485 pg/mL (08-14-19 @ 04:30)  Serum Pro-Brain Natriuretic Peptide: 640 pg/mL (08-13-19 @ 04:55)  Serum Pro-Brain Natriuretic Peptide: 1050 pg/mL (08-10-19 @ 20:26)    Troponin 0.51, CKMB --,  08-18-19 @ 04:27  Troponin 0.66, CKMB --, CK -- 08-17-19 @ 04:36  Troponin 1.42, CKMB --, CK -- 08-16-19 @ 04:37              Culture - Blood (collected 08-13-19 @ 11:58)  Source: .Blood None  Final Report (08-18-19 @ 23:00):    No growth at 5 days. Hospital Day:  8d    Subjective:    Patient is a 59y old  Male who presents with a chief complaint of Chest pain. no acute events overnight and pt remained stable. in no distress this am       Past Medical Hx:   COPD (chronic obstructive pulmonary disease)  DM (diabetes mellitus)  CAD (coronary artery disease) of bypass graft    Past Sx:  S/P CABG (coronary artery bypass graft)    Allergies:  No Known Allergies    Current Meds:   Standng Meds:  ALBUTerol/ipratropium (CFC free) Inhaler. 1 Puff(s) Inhalation every 4 hours  aspirin  chewable 81 milliGRAM(s) Enteral Tube daily  atorvastatin 80 milliGRAM(s) Oral at bedtime  chlorhexidine 4% Liquid 1 Application(s) Topical daily  clopidogrel Tablet 75 milliGRAM(s) Oral daily  docusate sodium 100 milliGRAM(s) Oral daily  furosemide    Tablet 40 milliGRAM(s) Oral two times a day  heparin  Injectable 5000 Unit(s) SubCutaneous every 8 hours  pantoprazole   Suspension 40 milliGRAM(s) Enteral Tube daily  senna 1 Tablet(s) Oral at bedtime    PRN Meds:  acetaminophen   Tablet .. 650 milliGRAM(s) Oral every 6 hours PRN Temp greater or equal to 38C (100.4F)  ALBUTerol/ipratropium (CFC free) Inhaler. 1 Puff(s) Inhalation every 4 hours PRN Wheezing    HOME MEDICATIONS:  atorvastatin 80 mg oral tablet: 1 tab(s) orally once a day  carvedilol 25 mg oral tablet: 1 tab(s) orally 2 times a day  furosemide 40 mg oral tablet: 1 tab(s) orally once a day  isosorbide mononitrate 120 mg oral tablet, extended release: 1 tab(s) orally once a day (in the morning)  lisinopril 40 mg oral tablet: 1 tab(s) orally once a day  Low Dose ASA 81 mg oral tablet: 1 tab(s) orally once a day      Vital Signs:   T(F): 97.4 (08-18-19 @ 21:16), Max: 98.2 (08-18-19 @ 16:41)  HR: 69 (08-18-19 @ 21:16) (66 - 78)  BP: 128/75 (08-18-19 @ 21:16) (121/84 - 129/76)  RR: 18 (08-18-19 @ 21:16) (18 - 28)  SpO2: 96% (08-18-19 @ 18:20) (95% - 97%)      08-17-19 @ 07:01  -  08-18-19 @ 07:00  --------------------------------------------------------  IN: 715 mL / OUT: 875 mL / NET: -160 mL    08-18-19 @ 07:01  -  08-19-19 @ 06:20  --------------------------------------------------------  IN: 240 mL / OUT: 400 mL / NET: -160 mL        Physical Exam:   GENERAL: NAD  HEENT: NCAT  CHEST/LUNG: CTAB  HEART: Regular rate and rhythm; s1 s2 appreciated, No murmurs, rubs, or gallops  ABDOMEN: Soft, Nontender, Nondistended; Bowel sounds present  EXTREMITIES: No LE edema b/l  NERVOUS SYSTEM:  Alert & Oriented X3        Labs:                         14.5   12.84 )-----------( 150      ( 18 Aug 2019 04:27 )             45.2       18 Aug 2019 04:27    142    |  99     |  36     ----------------------------<  120    3.8     |  29     |  0.8      Ca    9.5        18 Aug 2019 04:27  Phos  2.7       18 Aug 2019 04:27  Mg     2.3       18 Aug 2019 04:27    TPro  6.5    /  Alb  3.7    /  TBili  1.2    /  DBili  x      /  AST  21     /  ALT  27     /  AlkPhos  58     18 Aug 2019 04:27            Serum Pro-Brain Natriuretic Peptide: 391 pg/mL (08-16-19 @ 04:37)  Serum Pro-Brain Natriuretic Peptide: 485 pg/mL (08-14-19 @ 04:30)  Serum Pro-Brain Natriuretic Peptide: 640 pg/mL (08-13-19 @ 04:55)  Serum Pro-Brain Natriuretic Peptide: 1050 pg/mL (08-10-19 @ 20:26)    Troponin 0.51, CKMB --,  08-18-19 @ 04:27  Troponin 0.66, CKMB --, CK -- 08-17-19 @ 04:36  Troponin 1.42, CKMB --, CK -- 08-16-19 @ 04:37              Culture - Blood (collected 08-13-19 @ 11:58)  Source: .Blood None  Final Report (08-18-19 @ 23:00):    No growth at 5 days.

## 2019-08-19 NOTE — PROGRESS NOTE ADULT - SUBJECTIVE AND OBJECTIVE BOX
Patient is a 59y old  Male who presents with a chief complaint of Chest pain (19 Aug 2019 06:20)    SUBJ:  Patient seen and examined. Extubated, feels better in terms of breathing, but still weak. No chest pain at this time.      MEDICATIONS  (STANDING):  ALBUTerol/ipratropium (CFC free) Inhaler. 1 Puff(s) Inhalation every 4 hours  aspirin  chewable 81 milliGRAM(s) Enteral Tube daily  atorvastatin 80 milliGRAM(s) Oral at bedtime  chlorhexidine 4% Liquid 1 Application(s) Topical daily  clopidogrel Tablet 75 milliGRAM(s) Oral daily  docusate sodium 100 milliGRAM(s) Oral daily  furosemide    Tablet 40 milliGRAM(s) Oral two times a day  heparin  Injectable 5000 Unit(s) SubCutaneous every 8 hours  pantoprazole   Suspension 40 milliGRAM(s) Enteral Tube daily  senna 1 Tablet(s) Oral at bedtime    MEDICATIONS  (PRN):  acetaminophen   Tablet .. 650 milliGRAM(s) Oral every 6 hours PRN Temp greater or equal to 38C (100.4F)  ALBUTerol/ipratropium (CFC free) Inhaler. 1 Puff(s) Inhalation every 4 hours PRN Wheezing            Vital Signs Last 24 Hrs  T(C): 35.6 (19 Aug 2019 06:45), Max: 36.8 (18 Aug 2019 16:41)  T(F): 96 (19 Aug 2019 06:45), Max: 98.2 (18 Aug 2019 16:41)  HR: 61 (19 Aug 2019 06:45) (61 - 78)  BP: 128/73 (19 Aug 2019 06:45) (121/84 - 129/76)  BP(mean): 92 (18 Aug 2019 18:20) (92 - 95)  RR: 18 (19 Aug 2019 06:45) (18 - 20)  SpO2: 96% (18 Aug 2019 18:20) (95% - 97%)      PHYSICAL EXAM:    GEN: AAO x 3, NAD  HEENT: NC/AT  Neck: No JVD  CV: Reg, S1-S2  Lungs: decreased BS  Abd: Soft, non-tender  Ext: No edema      I&O's Summary    18 Aug 2019 07:01  -  19 Aug 2019 07:00  --------------------------------------------------------  IN: 240 mL / OUT: 400 mL / NET: -160 mL    	      TTE:  < from: Transthoracic Echocardiogram (08.14.19 @ 14:08) >  Summary:   1. Left ventricular ejection fraction, by visual estimation,is 20 to   25%.   2. Spectral Doppler shows pseudonormal pattern of left ventricular   myocardial filling (Grade II diastolic dysfunction).   3. Mitral annular calcification.   4. Sclerotic aortic valve with normal opening.   5. Mild dilatation of the aortic root.   6. Estimated pulmonary artery systolic pressure is 47.1 mmHg assuming a   right atrial pressure of 15 mmHg, which is consistent with mild pulmonary   hypertension.   7. LAD distribution, entire septum, and entire apex are abnormal as   described above.    < end of copied text >      LABS:                        14.5   12.84 )-----------( 150      ( 18 Aug 2019 04:27 )             45.2     08-18    142  |  99  |  36<H>  ----------------------------<  120<H>  3.8   |  29  |  0.8    Ca    9.5      18 Aug 2019 04:27  Phos  2.7     08-18  Mg     2.3     08-18    TPro  6.5  /  Alb  3.7  /  TBili  1.2  /  DBili  x   /  AST  21  /  ALT  27  /  AlkPhos  58  08-18    CARDIAC MARKERS ( 18 Aug 2019 04:27 )  x     / 0.51 ng/mL / 160 U/L / x     / x                BNP  RADIOLOGY & ADDITIONAL STUDIES:      IMPRESSION AND PLAN:

## 2019-08-19 NOTE — PROGRESS NOTE ADULT - ASSESSMENT
A 60 yo M w/ PMH of COPD (smoke 4 packs/day for over 30 years), CAD s/p CABG, DM and hx of illicit drug use presents to the ED unresponsive. 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 Shortly after, pt asked for ambulance and was intubated on site.   In the ED, trauma workup was negative for head trauma and PE was r/o via CTA, CRX showed mild pulm edema.  pt was started on low dose of versed, azithromycin and ceftriaxone, duoneb and solumedrol 125 mg x 1 and pt was admitted to ICU.    #RS failure  -Acute decomp HF VS COPD VS pneumonia  -CXR no cardiopulm disease, no consolidation   -LASIX 40 IV Q 12H  -solumedrol 60 mg q8h  -albuterol/ipratropium q4h  -Monitor Is & Os    #pneumonia:  -s/p unasyn  -Bl cx NTD  -Monitor Is & Os      #CHF AND ACS  -EF 20-25%, pBNP 640  -c/w ASA and statin, plavix  -restart BB and ACEi when BP permits, currently off pressors   -Troponin trending down  -BNP AM  -cardio Will consider cath once more stable.    #DVT ppx  -UFH    #DM: monitor FS    #GI prophylaxis. protonix,  OG Feeding A 60 yo M w/ PMH of COPD (smoke 4 packs/day for over 30 years), CAD s/p CABG, DM and hx of illicit drug use presents to the ED unresponsive. 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 Shortly after, pt asked for ambulance and was intubated on site.   In the ED, trauma workup was negative for head trauma and PE was r/o via CTA, CRX showed mild pulm edema.  pt was started on low dose of versed, azithromycin and ceftriaxone, duoneb and solumedrol 125 mg x 1 and pt was admitted to ICU.    #RS failure  -Acute decomp HF VS COPD VS pneumonia  -CXR no cardiopulm disease, no consolidation   -LASIX 40 IV Q 12H, solumedrol 60 mg q8h, albuterol/ipratropium q4h  -Monitor Is & Os    #pneumonia:  -s/p unasyn  -Bl cx NTD  -Follow ID rec  -Monitor Is & Os    #CHF AND ACS  -Pending medical rec of ECHO from [facility]   -EF 20-25%, pBNP 640  -c/w ASA and statin, plavix  -restart BB and ACEi when BP permits, currently off pressors   -Troponin trending down  -BMP AM  -Pt refusing cath at this time     #DVT ppx  -UFH    #DM: monitor FS    #GI prophylaxis. protonix,  OG Feeding A 58 yo M w/ PMH of COPD (smoke 4 packs/day for over 30 years), CAD s/p CABG, DM and hx of illicit drug use presents to the ED unresponsive. 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 Shortly after, pt asked for ambulance and was intubated on site.   In the ED, trauma workup was negative for head trauma and PE was r/o via CTA, CRX showed mild pulm edema. He was started on low dose of versed, azithromycin and ceftriaxone, duoneb and solumedrol 125 mg x 1 and pt was admitted to ICU.    #RS failure  -COPD VS pneumonia  -CXR no cardiopulm disease, no consolidation   -LASIX 40 IV Q 12H, solumedrol 60 mg q8h, albuterol/ipratropium q4h  -Monitor Is & Os    #CHF AND ACS  -Pending medical rec of ECHO from [facility]   -EF 20-25%, pBNP 640  -c/w ASA and statin, plavix  -restart BB and ACEi when BP permits  -Troponin trending down  -BMP AM  -Cath on Wed with Dr Eason    #pneumonia:  -s/p unasyn  -Bl cx NTD  -Follow ID rec  -Monitor Is & Os        #DVT ppx  -UFH    #DM: monitor FS    #GI prophylaxis. protonix,  OG Feeding

## 2019-08-19 NOTE — PROGRESS NOTE ADULT - ATTENDING COMMENTS
Patient was seen and examined.  plan of care discussed.    A 60 yo M w/ PMH of COPD (smoke 4 packs/day for over 30 years), CAD s/p CABG, DM and hx of illicit drug use presents to the ED unresponsive. 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 Shortly after, pt asked for ambulance and was intubated on site.   In the ED, trauma workup was negative for head trauma and PE was r/o via CTA, CRX showed mild pulm edema.  pt was started on low dose of versed, azithromycin and ceftriaxone, duoneb and solumedrol 125 mg x 1 and pt was admitted to ICU.    #Acute respiratory failure  -Acute decomp HF VS COPD VS pneumonia  -CXR no cardiopulm disease, no consolidation   -LASIX 40 IV Q 12H, solumedrol 60 mg q8h, albuterol/ipratropium q4h  -Monitor Is & Os    #pneumonia:  -s/p unasyn  -Bl cx NTD  -Follow ID rec  -Monitor Is & Os    #CHF AND ACS  -Pending medical rec of ECHO from [facility]   -EF 20-25%, pBNP 640  -c/w ASA and statin, plavix  -restart BB and ACEi when BP permits, currently off pressors   -Troponin trending down  -BMP AM  -Patient is considering cath now  -EP consult Patient was seen and examined.  plan of care discussed.    A 58 yo M w/ PMH of COPD (smoke 4 packs/day for over 30 years), CAD s/p CABG, DM and hx of illicit drug use presents to the ED unresponsive. 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 Shortly after, pt asked for ambulance and was intubated on site.   In the ED, trauma workup was negative for head trauma and PE was r/o via CTA, CRX showed mild pulm edema.  pt was started on low dose of versed, azithromycin and ceftriaxone, duoneb and solumedrol 125 mg x 1 and pt was admitted to ICU.    #Acute respiratory failure  -Acute decomp HF VS COPD VS pneumonia  -CXR no cardiopulm disease, no consolidation   -Switched to po lasix.  PO steriod with taper.   albuterol/ipratropium q4h  -Monitor Is & Os    #pneumonia:  -s/p unasyn  -Bl cx NTD  -Follow ID rec  -Monitor Is & Os    #CHF AND ACS  -Pending medical rec of ECHO from [facility]   -EF 20-25%, pBNP 640  -c/w ASA and statin, plavix  -restart BB and ACEi when BP permits, currently off pressors   -Troponin trending down  -BMP AM  -Patient is considering cath now  -EP consult

## 2019-08-19 NOTE — PROGRESS NOTE ADULT - ASSESSMENT
Cath discussed with the patient. He refuses the procedure, stating he had it many times previously and he is not willing to have a catheterization again.  C/w medical therapy for CAD, CHF, COPD.  Add isosorbide dinitrate 10 mg q12  C/w ASA and Plavix  High dose statin  Add losartan 25 mg if tolerated  Not on b-blocker due to severe COPD.  Still an active smoker - smoking cessation discussed.  Awaiting records from [facility], specifically prior echo and cath reports.  Will follow with you.

## 2019-08-20 LAB — GLUCOSE BLDC GLUCOMTR-MCNC: 126 MG/DL — HIGH (ref 70–99)

## 2019-08-20 PROCEDURE — 99233 SBSQ HOSP IP/OBS HIGH 50: CPT

## 2019-08-20 PROCEDURE — 99232 SBSQ HOSP IP/OBS MODERATE 35: CPT

## 2019-08-20 RX ADMIN — HEPARIN SODIUM 5000 UNIT(S): 5000 INJECTION INTRAVENOUS; SUBCUTANEOUS at 06:31

## 2019-08-20 RX ADMIN — ISOSORBIDE DINITRATE 10 MILLIGRAM(S): 5 TABLET ORAL at 06:31

## 2019-08-20 RX ADMIN — CLOPIDOGREL BISULFATE 75 MILLIGRAM(S): 75 TABLET, FILM COATED ORAL at 12:45

## 2019-08-20 RX ADMIN — PANTOPRAZOLE SODIUM 40 MILLIGRAM(S): 20 TABLET, DELAYED RELEASE ORAL at 12:44

## 2019-08-20 RX ADMIN — HEPARIN SODIUM 5000 UNIT(S): 5000 INJECTION INTRAVENOUS; SUBCUTANEOUS at 22:54

## 2019-08-20 RX ADMIN — Medication 81 MILLIGRAM(S): at 12:45

## 2019-08-20 RX ADMIN — ISOSORBIDE DINITRATE 10 MILLIGRAM(S): 5 TABLET ORAL at 17:06

## 2019-08-20 RX ADMIN — Medication 1 PUFF(S): at 07:35

## 2019-08-20 RX ADMIN — Medication 100 MILLIGRAM(S): at 12:45

## 2019-08-20 RX ADMIN — ATORVASTATIN CALCIUM 80 MILLIGRAM(S): 80 TABLET, FILM COATED ORAL at 22:55

## 2019-08-20 RX ADMIN — HEPARIN SODIUM 5000 UNIT(S): 5000 INJECTION INTRAVENOUS; SUBCUTANEOUS at 12:45

## 2019-08-20 RX ADMIN — CHLORHEXIDINE GLUCONATE 1 APPLICATION(S): 213 SOLUTION TOPICAL at 06:30

## 2019-08-20 RX ADMIN — Medication 40 MILLIGRAM(S): at 17:06

## 2019-08-20 NOTE — PROGRESS NOTE ADULT - ASSESSMENT
A 60 yo M w/ PMH of COPD (smoke 4 packs/day for over 30 years), CAD s/p CABG, DM and hx of illicit drug use presents to the ED unresponsive. 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 Shortly after, pt asked for ambulance and was intubated on site.   In the ED, trauma workup was negative for head trauma and PE was r/o via CTA, CRX showed mild pulm edema. He was started on low dose of versed, azithromycin and ceftriaxone, duoneb and solumedrol 125 mg x 1 and pt was admitted to ICU.    #RS failure  -COPD VS pneumonia  -CXR no cardiopulm disease, no consolidation   -LASIX 40 IV Q 12H, solumedrol 60 mg q8h, albuterol/ipratropium q4h  -Monitor Is & Os    #CHF AND ACS  -Pending medical rec of ECHO from [facility]   -EF 20-25%, pBNP 640  -c/w ASA and statin, plavix  -restart BB and ACEi when BP permits  -Troponin trending down  -BMP AM  -Cath on Wed with Dr Eason    #pneumonia:  -s/p unasyn  -Bl cx NTD  -Follow ID rec  -Monitor Is & Os    #DM: monitor FS    Diet: NPO tonight for cath tomorrow  DVT ppx: UFH  GI prophylaxis. protonix,  OG Feeding A 60 yo M w/ PMH of COPD (smoke 4 packs/day for over 30 years), CAD s/p CABG, DM and hx of illicit drug use presents to the ED unresponsive. 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 Shortly after, pt asked for ambulance and was intubated on site.   In the ED, trauma workup was negative for head trauma and PE was r/o via CTA, CRX showed mild pulm edema. He was started on low dose of versed, azithromycin and ceftriaxone, duoneb and solumedrol 125 mg x 1 and pt was admitted to ICU.    #RS failure  -Breathing RA, no distress  -CXR no cardiopulm disease, no consolidation   -LASIX 40 IV Q 12H, albuterol/ipratropium q4h  -Off steroids  -Monitor Is & Os    #CHF AND ACS  -Pending medical rec of ECHO from [facility]   -EF 20-25%, pBNP 640  -c/w ASA and statin, plavix  -BP wnl: restart BB and ACEi  -Cath on tomorrow with Dr Eason  -NPO after breakfast    #pneumonia:  -Bl cx NTD  -Follow ID rec  -Monitor Is & Os    #DM: monitor FS    Diet: NPO after breakfast tomorrow for cath tomorrow  DVT ppx: UFH  GI prophylaxis. protonix,  OG Feeding A 58 yo M w/ PMH of COPD (smoke 4 packs/day for over 30 years), CAD s/p CABG, DM and hx of illicit drug use presents to the ED unresponsive. 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 Shortly after, pt asked for ambulance and was intubated on site.   In the ED, trauma workup was negative for head trauma and PE was r/o via CTA, CRX showed mild pulm edema. He was started on low dose of versed, azithromycin and ceftriaxone, duoneb and solumedrol 125 mg x 1 and pt was admitted to ICU.    #RS failure  -Breathing RA, no distress  -CXR no cardiopulm disease, no consolidation   -LASIX 40 IV Q 12H, albuterol/ipratropium q4h  -Off steroids  -Monitor Is & Os    #CHF AND ACS  -Pending medical rec of ECHO from [facility]   -EF 20-25%, pBNP 640  -c/w ASA and statin, plavix  -BP wnl: restart BB and ACEi  -Cath on tomorrow with Dr Eason  -NPO after breakfast  -Pending PT c/s    #pneumonia:  -Bl cx NTD  -Follow ID rec  -Monitor Is & Os    #DM: monitor FS    Diet: NPO after breakfast tomorrow for cath  DVT ppx: UFH  GI prophylaxis. protonix,  OG Feeding

## 2019-08-20 NOTE — PROGRESS NOTE ADULT - ATTENDING COMMENTS
Patient was seen and examined.  plan of care discussed.    A 58 yo M w/ PMH of COPD (smoke 4 packs/day for over 30 years), CAD s/p CABG, DM and hx of illicit drug use presents to the ED unresponsive. 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 Shortly after, pt asked for ambulance and was intubated on site.   In the ED, trauma workup was negative for head trauma and PE was r/o via CTA, CRX showed mild pulm edema.  pt was started on low dose of versed, azithromycin and ceftriaxone, duoneb and solumedrol 125 mg x 1 and pt was admitted to ICU.    #Acute respiratory failure  -Acute decomp HF VS COPD VS pneumonia  -CXR no cardiopulm disease, no consolidation   -Switched to po lasix. finished course of steriod.  Currently on RA   albuterol/ipratropium q4h  -Monitor Is & Os    #pneumonia:  -s/p unasyn  -Bl cx NTD  -Follow ID rec  -Monitor Is & Os    #CHF AND ACS  -Pending medical rec of ECHO from [facility]   -EF 20-25%, pBNP 640  -c/w ASA and statin, plavix  -restart BB and ACEi when BP permits, currently off pressors   -Troponin trending down  -BMP AM  -Patient is agreeable to cath  -EP consult.     I was physically present for the key portions of the evaluation and management (E/M) service provided.  I agree with the above history, physical, and plan which I have reviewed and edited where appropriate.

## 2019-08-20 NOTE — PROGRESS NOTE ADULT - SUBJECTIVE AND OBJECTIVE BOX
Hospital Day:  9d    Subjective:    Patient is a 59y old  Male who presents with a chief complaint of Chest pain (19 Aug 2019 09:01)      Past Medical Hx:   COPD (chronic obstructive pulmonary disease)  DM (diabetes mellitus)  CAD (coronary artery disease) of bypass graft    Past Sx:  S/P CABG (coronary artery bypass graft)    Allergies:  No Known Allergies    Current Meds:   Standng Meds:  ALBUTerol/ipratropium (CFC free) Inhaler. 1 Puff(s) Inhalation every 4 hours  aspirin  chewable 81 milliGRAM(s) Enteral Tube daily  atorvastatin 80 milliGRAM(s) Oral at bedtime  chlorhexidine 4% Liquid 1 Application(s) Topical daily  clopidogrel Tablet 75 milliGRAM(s) Oral daily  docusate sodium 100 milliGRAM(s) Oral daily  furosemide    Tablet 40 milliGRAM(s) Oral two times a day  heparin  Injectable 5000 Unit(s) SubCutaneous every 8 hours  isosorbide   dinitrate Tablet (ISORDIL) 10 milliGRAM(s) Oral two times a day  pantoprazole   Suspension 40 milliGRAM(s) Enteral Tube daily  senna 1 Tablet(s) Oral at bedtime    PRN Meds:  acetaminophen   Tablet .. 650 milliGRAM(s) Oral every 6 hours PRN Temp greater or equal to 38C (100.4F)  ALBUTerol/ipratropium (CFC free) Inhaler. 1 Puff(s) Inhalation every 4 hours PRN Wheezing    HOME MEDICATIONS:  atorvastatin 80 mg oral tablet: 1 tab(s) orally once a day  carvedilol 25 mg oral tablet: 1 tab(s) orally 2 times a day  furosemide 40 mg oral tablet: 1 tab(s) orally once a day  isosorbide mononitrate 120 mg oral tablet, extended release: 1 tab(s) orally once a day (in the morning)  lisinopril 40 mg oral tablet: 1 tab(s) orally once a day  Low Dose ASA 81 mg oral tablet: 1 tab(s) orally once a day      Vital Signs:   T(F): 97 (08-20-19 @ 05:30), Max: 97 (08-19-19 @ 14:00)  HR: 84 (08-20-19 @ 05:30) (61 - 100)  BP: 137/74 (08-20-19 @ 05:30) (118/60 - 137/74)  RR: 18 (08-20-19 @ 05:30) (18 - 18)  SpO2: --      08-18-19 @ 07:01  -  08-19-19 @ 07:00  --------------------------------------------------------  IN: 240 mL / OUT: 400 mL / NET: -160 mL    08-19-19 @ 07:01  -  08-20-19 @ 06:22  --------------------------------------------------------  IN: 240 mL / OUT: 300 mL / NET: -60 mL        Physical Exam:   GENERAL: NAD  HEENT: NCAT  CHEST/LUNG: CTAB  HEART: Regular rate and rhythm; s1 s2 appreciated, No murmurs, rubs, or gallops  ABDOMEN: Soft, Nontender, Nondistended; Bowel sounds present  EXTREMITIES: No LE edema b/l  NERVOUS SYSTEM:  Alert & Oriented X3        Labs:                     Serum Pro-Brain Natriuretic Peptide: 391 pg/mL (08-16-19 @ 04:37)  Serum Pro-Brain Natriuretic Peptide: 485 pg/mL (08-14-19 @ 04:30)  Serum Pro-Brain Natriuretic Peptide: 640 pg/mL (08-13-19 @ 04:55)  Serum Pro-Brain Natriuretic Peptide: 1050 pg/mL (08-10-19 @ 20:26)    Troponin 0.51, CKMB --,  08-18-19 @ 04:27  Troponin 0.66, CKMB --, CK -- 08-17-19 @ 04:36              Culture - Blood (collected 08-13-19 @ 11:58)  Source: .Blood None  Final Report (08-18-19 @ 23:00):    No growth at 5 days. Hospital Day:  9d    Subjective:    Patient is a 59y old  Male who presents with a chief complaint of Chest pain. There were no acute events overnight and pt remained stable, afebrile. This morning he was breathing RA, no physical complaints.        Past Medical Hx:   COPD (chronic obstructive pulmonary disease)  DM (diabetes mellitus)  CAD (coronary artery disease) of bypass graft    Past Sx:  S/P CABG (coronary artery bypass graft)    Allergies:  No Known Allergies    Current Meds:   Standng Meds:  ALBUTerol/ipratropium (CFC free) Inhaler. 1 Puff(s) Inhalation every 4 hours  aspirin  chewable 81 milliGRAM(s) Enteral Tube daily  atorvastatin 80 milliGRAM(s) Oral at bedtime  chlorhexidine 4% Liquid 1 Application(s) Topical daily  clopidogrel Tablet 75 milliGRAM(s) Oral daily  docusate sodium 100 milliGRAM(s) Oral daily  furosemide    Tablet 40 milliGRAM(s) Oral two times a day  heparin  Injectable 5000 Unit(s) SubCutaneous every 8 hours  isosorbide   dinitrate Tablet (ISORDIL) 10 milliGRAM(s) Oral two times a day  pantoprazole   Suspension 40 milliGRAM(s) Enteral Tube daily  senna 1 Tablet(s) Oral at bedtime    PRN Meds:  acetaminophen   Tablet .. 650 milliGRAM(s) Oral every 6 hours PRN Temp greater or equal to 38C (100.4F)  ALBUTerol/ipratropium (CFC free) Inhaler. 1 Puff(s) Inhalation every 4 hours PRN Wheezing    HOME MEDICATIONS:  atorvastatin 80 mg oral tablet: 1 tab(s) orally once a day  carvedilol 25 mg oral tablet: 1 tab(s) orally 2 times a day  furosemide 40 mg oral tablet: 1 tab(s) orally once a day  isosorbide mononitrate 120 mg oral tablet, extended release: 1 tab(s) orally once a day (in the morning)  lisinopril 40 mg oral tablet: 1 tab(s) orally once a day  Low Dose ASA 81 mg oral tablet: 1 tab(s) orally once a day      Vital Signs:   T(F): 97 (08-20-19 @ 05:30), Max: 97 (08-19-19 @ 14:00)  HR: 84 (08-20-19 @ 05:30) (61 - 100)  BP: 137/74 (08-20-19 @ 05:30) (118/60 - 137/74)  RR: 18 (08-20-19 @ 05:30) (18 - 18)  SpO2: --      08-18-19 @ 07:01  -  08-19-19 @ 07:00  --------------------------------------------------------  IN: 240 mL / OUT: 400 mL / NET: -160 mL    08-19-19 @ 07:01  -  08-20-19 @ 06:22  --------------------------------------------------------  IN: 240 mL / OUT: 300 mL / NET: -60 mL        Physical Exam:   GENERAL: NAD  HEENT: NCAT  CHEST/LUNG: CTAB  HEART: Regular rate and rhythm; s1 s2 appreciated, No murmurs, rubs, or gallops  ABDOMEN: Soft, Nontender, Nondistended; Bowel sounds present  EXTREMITIES: No LE edema b/l  NERVOUS SYSTEM:  Alert & Oriented X3        Labs:         Serum Pro-Brain Natriuretic Peptide: 391 pg/mL (08-16-19 @ 04:37)  Serum Pro-Brain Natriuretic Peptide: 485 pg/mL (08-14-19 @ 04:30)  Serum Pro-Brain Natriuretic Peptide: 640 pg/mL (08-13-19 @ 04:55)  Serum Pro-Brain Natriuretic Peptide: 1050 pg/mL (08-10-19 @ 20:26)    Troponin 0.51, CKMB --,  08-18-19 @ 04:27  Troponin 0.66, CKMB --, CK -- 08-17-19 @ 04:36              Culture - Blood (collected 08-13-19 @ 11:58)  Source: .Blood None  Final Report (08-18-19 @ 23:00):    No growth at 5 days.

## 2019-08-20 NOTE — CONSULT NOTE ADULT - SUBJECTIVE AND OBJECTIVE BOX
Patient is a 59y old  Male who presents with a chief complaint of Chest pain (20 Aug 2019 07:41)    HPI:  Pt was intubated on admission, hx was taken from . Carl Phipps (brother) over the phone.     A 56? yo M (named Mr. Dimitry Frazier) w/ PMH of COPD (smoke 4 packs/day for over 30 years), CAD s/p CABG, DM and hx of illicit drug use presents to the ED unresponsive. 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 Shortly after, pt asked for ambulance and was intubated on site. Pt was endorsing lethargy through out the day. As per brother, pt denies fever, chill, sick contacts, diarrhea, constipation. Denies alcohol use.     In the ED, pt was started on low dose of versed. Given azithromycin and ceftriaxone, multiple doses of duoneb and solumedrol 125 mg x 1. CXR appears to show mild pulmonary edema. CTH NC non remarkable and CT neck and chest show no acute pathology. ICU consulted for intubated patient.     Vital Signs Last 24 Hrs  T(C): 36.7 (10 Aug 2019 23:12), Max: 36.9 (10 Aug 2019 21:20)  T(F): 98.1 (10 Aug 2019 23:12), Max: 98.4 (10 Aug 2019 21:20)  HR: 66 (11 Aug 2019 01:00) (66 - 105)  BP: 134/81 (11 Aug 2019 01:00) (65/55 - 134/81)  BP(mean): 98 (11 Aug 2019 01:00) (85 - 103)  RR: 22 (11 Aug 2019 01:00) (16 - 22)  SpO2: 100% (11 Aug 2019 01:00) (97% - 100%) (11 Aug 2019 01:20)      PAST MEDICAL & SURGICAL HISTORY:  COPD (chronic obstructive pulmonary disease)  DM (diabetes mellitus)  CAD (coronary artery disease) of bypass graft  S/P CABG (coronary artery bypass graft)      Hospital Course: RS failure  -Breathing RA, no distress  -CXR no cardiopulm disease, no consolidation   -LASIX 40 IV Q 12H, albuterol/ipratropium q4h  -Off steroids  -Monitor Is & Os    #CHF AND ACS  -Pending medical rec of ECHO from [facility]   -EF 20-25%, pBNP 640  -c/w ASA and statin, plavix  -BP wnl: restart BB and ACEi  -Cath on tomorrow with Dr Eason  -NPO after breakfast    #pneumonia:  -Bl cx NTD  -Follow ID rec  -Monitor Is & Os    #DM: monitor FS    Diet: NPO after breakfast tomorrow for cath tomorrow  DVT ppx: UFH  GI prophylaxis. protonix,  OG Feeding       TODAY'S SUBJECTIVE & REVIEW OF SYMPTOMS:     Constitutional Weakness   Cardio WNL   Resp SOB   GI WNL  Heme WNL  Endo WNL  Skin WNL  MSK WNL  Neuro WNL  Cognitive WNL  Psych WNL      MEDICATIONS  (STANDING):  ALBUTerol/ipratropium (CFC free) Inhaler. 1 Puff(s) Inhalation every 4 hours  aspirin  chewable 81 milliGRAM(s) Enteral Tube daily  atorvastatin 80 milliGRAM(s) Oral at bedtime  chlorhexidine 4% Liquid 1 Application(s) Topical daily  clopidogrel Tablet 75 milliGRAM(s) Oral daily  docusate sodium 100 milliGRAM(s) Oral daily  furosemide    Tablet 40 milliGRAM(s) Oral two times a day  heparin  Injectable 5000 Unit(s) SubCutaneous every 8 hours  isosorbide   dinitrate Tablet (ISORDIL) 10 milliGRAM(s) Oral two times a day  pantoprazole   Suspension 40 milliGRAM(s) Enteral Tube daily  senna 1 Tablet(s) Oral at bedtime    MEDICATIONS  (PRN):  acetaminophen   Tablet .. 650 milliGRAM(s) Oral every 6 hours PRN Temp greater or equal to 38C (100.4F)  ALBUTerol/ipratropium (CFC free) Inhaler. 1 Puff(s) Inhalation every 4 hours PRN Wheezing      FAMILY HISTORY:      Allergies    No Known Allergies    Intolerances        SOCIAL HISTORY:    [    ] Etoh  [    ] Smoking  [  x  ] Substance abuse ?    Home Environment:  [    ] Home Alone  [   x ] Lives with Family BROTHER  [    ] Home Health Aid    Dwelling:  [   x ] Apartment  [    ] Private House  [    ] Adult Home  [    ] Skilled Nursing Facility      [    ] Short Term  [    ] Long Term  [  x  ] Stairs                           [    ] Elevator     FUNCTIONAL STATUS PTA: (Check all that apply)  Ambulation: [ x    ]Independent    [    ] Dependent     [    ] Non-Ambulatory  Assistive Device: [ x   ] SA Cane  [    ]  Q Cane  [    ] Walker  [    ]  Wheelchair  ADL : [  x  ] Independent  [    ]  Dependent       Vital Signs Last 24 Hrs  T(C): 36.1 (20 Aug 2019 05:30), Max: 36.1 (19 Aug 2019 14:00)  T(F): 97 (20 Aug 2019 05:30), Max: 97 (19 Aug 2019 14:00)  HR: 84 (20 Aug 2019 05:30) (84 - 100)  BP: 137/74 (20 Aug 2019 05:30) (118/60 - 137/74)  BP(mean): --  RR: 18 (20 Aug 2019 05:30) (18 - 18)  SpO2: --      PHYSICAL EXAM: Alert & Oriented X3  GENERAL: NAD, well-groomed, well-developed  HEAD:  Atraumatic, Normocephalic  EYES: EOMI, PERRLA, conjunctiva and sclera clear  NECK: Supple  CHEST/LUNG: Clear bilaterally  HEART: Regular rate and rhythm  ABDOMEN: Soft, Nontender, Nondistended; Bowel sounds present  EXTREMITIES:  no calf tenderness,no edema BLES    NERVOUS SYSTEM:  Cranial Nerves 2-12 intact [   x ] Abnormal  [    ]  ROM: WFL all extremities [   x ]  Abnormal [     ]  Motor Strength: WFL all extremities  [ x   ]  Abnormal [    ]  Sensation: intact to light touch [   x ] Abnormal [    ]      FUNCTIONAL STATUS:  Bed Mobility: [   ]  Independent [    ]  Supervision [  x  ]  Needs Assistance [  ]  N/A  Transfers: [    ]  Independent [    ]  Supervision [   x ]  Needs Assistance [    ]  N/A    Ambulation:  [    ]  Independent [    ]  Supervision [  x  ]  Needs Assistance [    ]  N/A   ADL:  [    ]   Independent [    ] Requires Assistance [ x   ] N/A       LABS:                RADIOLOGY & ADDITIONAL STUDIES:

## 2019-08-20 NOTE — CHART NOTE - NSCHARTNOTEFT_GEN_A_CORE
Registered Dietitian Follow-Up     Patient Profile Reviewed                           Yes [x]   No []     Nutrition History Previously Obtained        Yes [x]  No []       Pertinent Subjective Information:      Pertinent Medical Interventions: RS failure: s/p extubation, on Lasix. CHF AND ACS- pending cath. PNA: ID following.      Diet order: dysphagia 2 mech soft thin liquid      Anthropometrics:  - Ht. 167.6cm  - Wt. 86kg on 8/20 vs. 93.4kg on 8/16 ?bed scale discrepancy vs. fluid shifts- pt. on Lasix, will continue to monitor wt trends during LOS   - %wt change   - BMI 33.2  - IBW 65kg      Pertinent Lab Data: (8/18) WBC 12.84, BUN 36, glu 120     Pertinent Meds: Lasix, Colace, Senna, Atorvastatin, Protonix      Physical Findings:  - Appearance: alert&oriented  - GI function: no symptoms noted, last BM 8/19  - Tubes: none noted  - Oral/Mouth cavity:   - Skin: ecchymosis (BS 18)      Nutrition Requirements (from RD note on 8/11)  Weight Used: 86kg lowest doc weight, ideal 65kg     Estimated Energy Needs    Continue []  Adjust [x] 1933-2094kcal (MSJ x 1.2-1.3 AF)  Adjusted Energy Recommendations:   kcal/day        Estimated Protein Needs    Continue []  Adjust [x] 86-103g (1-1.2g/kg CBW)  Adjusted Protein Recommendations:   gm/day        Estimated Fluid Needs        Continue []  Adjust [x] 1ml/kcal or per LIP   Adjusted Fluid Recommendations:   mL/day     Nutrient Intake: ~75% PO at meals         [] Previous Nutrition Diagnosis:  Inadequate Enteral Nutrition Infusion- not pertaining anymore            [] Ongoing          [] Resolved       Nutrition Intervention: meals and snacks    Rec: Continue dysphagia 2 mech soft thin liquid diet      Goal/Expected Outcome: In      Indicator/Monitoring: energy intake, body composition, NFPF, electrolyte profile, glucose profile, micronutrient profile. Registered Dietitian Follow-Up     Patient Profile Reviewed                           Yes [x]   No []     Nutrition History Previously Obtained        Yes []  No [x]  Per. pt good appetite, regular meals and snacks PTP, no supplement use. Stable weight trends. regular diet. NKFA.      Pertinent Subjective Information: Pt. reports good appetite no issues tolerating current diet order.      Pertinent Medical Interventions: RS failure: s/p extubation, on Lasix. CHF AND ACS- pending cath. PNA: ID following.      Diet order: dysphagia 2 mech soft thin liquid      Anthropometrics:  - Ht. 167.6cm  - Wt. 86kg on 8/20 vs. 93.4kg on 8/16 ?bed scale discrepancy vs. fluid shifts- pt. on Lasix, will continue to monitor wt trends during LOS   - %wt change   - BMI 33.2  - IBW 65kg      Pertinent Lab Data: (8/18) WBC 12.84, BUN 36, glu 120     Pertinent Meds: Lasix, Colace, Senna, Atorvastatin, Protonix      Physical Findings:  - Appearance: alert&oriented  - GI function: no symptoms noted, last BM 8/19  - Tubes: none noted  - Oral/Mouth cavity:   - Skin: ecchymosis (BS 18)      Nutrition Requirements (from RD note on 8/11)  Weight Used: 86kg lowest doc weight, ideal 65kg     Estimated Energy Needs    Continue []  Adjust [x] 1933-2094kcal (MSJ x 1.2-1.3 AF)  Adjusted Energy Recommendations:   kcal/day        Estimated Protein Needs    Continue []  Adjust [x] 86-103g (1-1.2g/kg CBW)  Adjusted Protein Recommendations:   gm/day        Estimated Fluid Needs        Continue []  Adjust [x] 1ml/kcal or per LIP   Adjusted Fluid Recommendations:   mL/day     Nutrient Intake: ~% PO at meals         [] Previous Nutrition Diagnosis:  Inadequate Enteral Nutrition Infusion- not pertaining anymore            [] Ongoing          [] Resolved       Nutrition Intervention: meals and snacks    Rec: Continue dysphagia 2 mech soft thin liquid diet and add consistent carb w/snack diet modifier     Goal/Expected Outcome: In 7 days pt. to continue to consume % PO at meals      Indicator/Monitoring: energy intake, body composition, NFPF, electrolyte profile, glucose profile, micronutrient profile. Registered Dietitian Follow-Up     Patient Profile Reviewed                           Yes [x]   No []     Nutrition History Previously Obtained        Yes [x]  No []       Pertinent Subjective Information: Pt. reports good appetite no issues tolerating current diet order.      Pertinent Medical Interventions: RS failure: s/p extubation, on Lasix. CHF AND ACS- pending cath. PNA: ID following.      Diet order: dysphagia 2 mech soft thin liquid      Anthropometrics:  - Ht. 167.6cm  - Wt. 86kg on 8/20 vs. 93.4kg on 8/16 ?bed scale discrepancy vs. fluid shifts- pt. on Lasix, will continue to monitor wt trends during LOS   - %wt change   - BMI 33.2  - IBW 65kg      Pertinent Lab Data: (8/18) WBC 12.84, BUN 36, glu 120        Nutrition Requirements (from RD note on 8/17)  Weight Used: 86kg lowest doc weight, ideal 65kg     Estimated Energy Needs    Continue []  Adjust [x] 1622-1784kcal (MSJ x 1.0-1.1 AF)  Adjusted Energy Recommendations:   kcal/day        Estimated Protein Needs    Continue []  Adjust [x] 65-78g (1-1.2g/kg IBW)  Adjusted Protein Recommendations:   gm/day        Estimated Fluid Needs        Continue []  Adjust [x] 1ml/kcal or per LIP   Adjusted Fluid Recommendations:   mL/day     Nutrient Intake: ~% PO at meals         [] Previous Nutrition Diagnosis:  Inadequate oral intake            [] Ongoing          [x] Resolved       Nutrition Intervention: meals and snacks    Rec: Continue dysphagia 2 mech soft thin liquid diet and add consistent carb w/snack diet modifier     Goal/Expected Outcome: In 7 days pt. to continue to consume % PO at meals      Indicator/Monitoring: energy intake, body composition, NFPF, electrolyte profile, glucose profile, micronutrient profile.

## 2019-08-20 NOTE — PROGRESS NOTE ADULT - SUBJECTIVE AND OBJECTIVE BOX
Patient is a 59y old  Male who presents with a chief complaint of Chest pain (20 Aug 2019 06:22)    SUBJ:  Patient seen and examined. Still weak. No chest pain. Dyspnea improved.      MEDICATIONS  (STANDING):  ALBUTerol/ipratropium (CFC free) Inhaler. 1 Puff(s) Inhalation every 4 hours  aspirin  chewable 81 milliGRAM(s) Enteral Tube daily  atorvastatin 80 milliGRAM(s) Oral at bedtime  chlorhexidine 4% Liquid 1 Application(s) Topical daily  clopidogrel Tablet 75 milliGRAM(s) Oral daily  docusate sodium 100 milliGRAM(s) Oral daily  furosemide    Tablet 40 milliGRAM(s) Oral two times a day  heparin  Injectable 5000 Unit(s) SubCutaneous every 8 hours  isosorbide   dinitrate Tablet (ISORDIL) 10 milliGRAM(s) Oral two times a day  pantoprazole   Suspension 40 milliGRAM(s) Enteral Tube daily  senna 1 Tablet(s) Oral at bedtime    MEDICATIONS  (PRN):  acetaminophen   Tablet .. 650 milliGRAM(s) Oral every 6 hours PRN Temp greater or equal to 38C (100.4F)  ALBUTerol/ipratropium (CFC free) Inhaler. 1 Puff(s) Inhalation every 4 hours PRN Wheezing            Vital Signs Last 24 Hrs  T(C): 36.1 (20 Aug 2019 05:30), Max: 36.1 (19 Aug 2019 14:00)  T(F): 97 (20 Aug 2019 05:30), Max: 97 (19 Aug 2019 14:00)  HR: 84 (20 Aug 2019 05:30) (84 - 100)  BP: 137/74 (20 Aug 2019 05:30) (118/60 - 137/74)  BP(mean): --  RR: 18 (20 Aug 2019 05:30) (18 - 18)  SpO2: --      PHYSICAL EXAM:    GEN: AAO x 3, NAD  HEENT: NC/AT, PERRL  Neck: No JVD, no bruits  CV: Reg, S1-S2, no murmur  Lungs: no rales  Abd: Soft, non-tender  Ext: No edema, + venostasis      I&O's Summary    19 Aug 2019 07:01  -  20 Aug 2019 07:00  --------------------------------------------------------  IN: 240 mL / OUT: 300 mL / NET: -60 mL    	        LABS:                    BNP  RADIOLOGY & ADDITIONAL STUDIES:      IMPRESSION AND PLAN:

## 2019-08-20 NOTE — PROGRESS NOTE ADULT - ASSESSMENT
S/p NSTEMI  syustolic CHF  Patient is now agreeable for cardiac cath.  Still awaiting for cath and echo reports from [facility]  C/w medical therapy for CAD  Add Losartan  Not on b-blocker due to COPD  C/w DAPT.  Further recommendations post cath.  OOB to chair, ambulate with assistance  Consider PT.  NPO after breakfast for cath tomorrow - scheduled for 1:30 pm.

## 2019-08-21 LAB — GLUCOSE BLDC GLUCOMTR-MCNC: 129 MG/DL — HIGH (ref 70–99)

## 2019-08-21 PROCEDURE — 99233 SBSQ HOSP IP/OBS HIGH 50: CPT

## 2019-08-21 PROCEDURE — 93459 L HRT ART/GRFT ANGIO: CPT | Mod: 26

## 2019-08-21 PROCEDURE — 99232 SBSQ HOSP IP/OBS MODERATE 35: CPT | Mod: 25

## 2019-08-21 RX ORDER — SACUBITRIL AND VALSARTAN 24; 26 MG/1; MG/1
1 TABLET, FILM COATED ORAL
Refills: 0 | Status: DISCONTINUED | OUTPATIENT
Start: 2019-08-21 | End: 2019-08-23

## 2019-08-21 RX ORDER — SODIUM CHLORIDE 9 MG/ML
1000 INJECTION INTRAMUSCULAR; INTRAVENOUS; SUBCUTANEOUS
Refills: 0 | Status: DISCONTINUED | OUTPATIENT
Start: 2019-08-21 | End: 2019-08-23

## 2019-08-21 RX ORDER — METOPROLOL TARTRATE 50 MG
12.5 TABLET ORAL
Refills: 0 | Status: DISCONTINUED | OUTPATIENT
Start: 2019-08-21 | End: 2019-08-23

## 2019-08-21 RX ADMIN — CLOPIDOGREL BISULFATE 75 MILLIGRAM(S): 75 TABLET, FILM COATED ORAL at 11:24

## 2019-08-21 RX ADMIN — Medication 40 MILLIGRAM(S): at 17:43

## 2019-08-21 RX ADMIN — Medication 1 PUFF(S): at 08:25

## 2019-08-21 RX ADMIN — Medication 40 MILLIGRAM(S): at 06:14

## 2019-08-21 RX ADMIN — HEPARIN SODIUM 5000 UNIT(S): 5000 INJECTION INTRAVENOUS; SUBCUTANEOUS at 21:34

## 2019-08-21 RX ADMIN — SACUBITRIL AND VALSARTAN 1 TABLET(S): 24; 26 TABLET, FILM COATED ORAL at 17:43

## 2019-08-21 RX ADMIN — SENNA PLUS 1 TABLET(S): 8.6 TABLET ORAL at 21:34

## 2019-08-21 RX ADMIN — HEPARIN SODIUM 5000 UNIT(S): 5000 INJECTION INTRAVENOUS; SUBCUTANEOUS at 06:15

## 2019-08-21 RX ADMIN — ISOSORBIDE DINITRATE 10 MILLIGRAM(S): 5 TABLET ORAL at 17:43

## 2019-08-21 RX ADMIN — ATORVASTATIN CALCIUM 80 MILLIGRAM(S): 80 TABLET, FILM COATED ORAL at 21:34

## 2019-08-21 RX ADMIN — ISOSORBIDE DINITRATE 10 MILLIGRAM(S): 5 TABLET ORAL at 06:14

## 2019-08-21 RX ADMIN — Medication 12.5 MILLIGRAM(S): at 17:43

## 2019-08-21 RX ADMIN — Medication 81 MILLIGRAM(S): at 11:24

## 2019-08-21 NOTE — PROGRESS NOTE ADULT - ASSESSMENT
CAD s/p CABG  CHF  NSTEMI      Plan:    S/p cath - all grafts occluded, prior stents in RCA - occluded, LCX stents - severe restenosis.    C/w medical therapy. Add low dose Entresto.  Try to introduce low dose b-blocker, if able to tolerate despite COPD. (metoprolol 12.5 mg q12)  Smoking cessation.  CT surgery and EP evaluation for possible CABG and AICD/LifeVest respectively.   Viability study to assess prior to CABG.  Plan discussed with the patient.

## 2019-08-21 NOTE — PHYSICAL THERAPY INITIAL EVALUATION ADULT - SPECIFY REASON(S)
Pt not in room. As per documentation, pt likely went for cardiac cath. PT to f/u when pt is available and medically appropriate for b/s PT.

## 2019-08-21 NOTE — PROGRESS NOTE ADULT - SUBJECTIVE AND OBJECTIVE BOX
LISA DUDLEY    Patient is a 59y old  Male who presents with a chief complaint of Chest pain (21 Aug 2019 14:55)    HPI:  Pt was intubated on admission, hx was taken from Mr. Carl Phipps (brother) over the phone.     A 56? yo M (named Mr. Lisa Dudley) w/ PMH of COPD (smoke 4 packs/day for over 30 years), CAD s/p CABG, DM and hx of illicit drug use presents to the ED unresponsive. 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 Shortly after, pt asked for ambulance and was intubated on site. Pt was endorsing lethargy through out the day. As per brother, pt denies fever, chill, sick contacts, diarrhea, constipation. Denies alcohol use.     In the ED, pt was started on low dose of versed. Given azithromycin and ceftriaxone, multiple doses of duoneb and solumedrol 125 mg x 1. CXR appears to show mild pulmonary edema. CTH NC non remarkable and CT neck and chest show no acute pathology. ICU consulted for intubated patient.     Vital Signs Last 24 Hrs  T(C): 36.7 (10 Aug 2019 23:12), Max: 36.9 (10 Aug 2019 21:20)  T(F): 98.1 (10 Aug 2019 23:12), Max: 98.4 (10 Aug 2019 21:20)  HR: 66 (11 Aug 2019 01:00) (66 - 105)  BP: 134/81 (11 Aug 2019 01:00) (65/55 - 134/81)  BP(mean): 98 (11 Aug 2019 01:00) (85 - 103)  RR: 22 (11 Aug 2019 01:00) (16 - 22)  SpO2: 100% (11 Aug 2019 01:00) (97% - 100%) (11 Aug 2019 01:20)    INTERVAL HPI/OVERNIGHT EVENTS: pt underwent cardiac cath today. currently denies chest pain or SOB.     ROS: All ROS negative    PHYSICAL EXAM:  T(C): 35.8, Max: 37.1 (08-20-19 @ 20:00)  HR: 101 (92 - 111)  BP: 151/80 (129/84 - 157/82)  RR: 18 (18 - 18)  SpO2: --    GENERAL: NAD, obese  PULMONARY/CHEST: No rales, rhonchi, wheezing, basilar crackles  CARDIOVASC: Regular rate and rhythm; No murmurs  GI/ABDOMEN: obese, Soft, Nontender, Nondistended; Bowel sounds present  EXTREMITIES: Right femoral pulse +, no hematoma, no bleeding noted. No edema. No calf tenderness b/l.  NERVOUS SYSTEM:  Alert & Oriented X3, no focal deficit     Consultant(s) Notes Reviewed by me.     MEDICATIONS  (STANDING):  ALBUTerol/ipratropium (CFC free) Inhaler. 1 Puff(s) Inhalation every 4 hours  aspirin  chewable 81 milliGRAM(s) Enteral Tube daily  atorvastatin 80 milliGRAM(s) Oral at bedtime  chlorhexidine 4% Liquid 1 Application(s) Topical daily  clopidogrel Tablet 75 milliGRAM(s) Oral daily  docusate sodium 100 milliGRAM(s) Oral daily  furosemide    Tablet 40 milliGRAM(s) Oral two times a day  heparin  Injectable 5000 Unit(s) SubCutaneous every 8 hours  isosorbide   dinitrate Tablet (ISORDIL) 10 milliGRAM(s) Oral two times a day  metoprolol tartrate 12.5 milliGRAM(s) Oral two times a day  pantoprazole   Suspension 40 milliGRAM(s) Enteral Tube daily  sacubitril 24 mG/valsartan 26 mG 1 Tablet(s) Oral two times a day  senna 1 Tablet(s) Oral at bedtime  sodium chloride 0.9%. 1000 milliLiter(s) (50 mL/Hr) IV Continuous <Continuous>    MEDICATIONS  (PRN):  acetaminophen   Tablet .. 650 milliGRAM(s) Oral every 6 hours PRN Temp greater or equal to 38C (100.4F)  ALBUTerol/ipratropium (CFC free) Inhaler. 1 Puff(s) Inhalation every 4 hours PRN Wheezing

## 2019-08-21 NOTE — PROGRESS NOTE ADULT - ASSESSMENT
#Acute respiratory failure due to decompensated systolic heart failure and COPD, PNA, suspected GNR.  - respiratory failure resolved, s/p intubation on admission, currently on RA  - ABx and steroids course completed   - cont lasix PO  - cont albuterol/ipratropium q4h  - Monitor Is & Os    # ACS, severe 3 vessels disease, all 4 grafts occluded  - EF 20-25%, pBNP 640  - c/w ASA and statin, plavix  - resume BB and Entrestro low dose, monitor BP  - CTS eval and EP eval  - BMP AM  - smoking cessation encouraged and counseled      DVT ppx     #Progress Note Handoff  Pending  Consults: CTS, EP  Tests: AM BMP  Family Discussion: not needed  Future Disposition: home

## 2019-08-21 NOTE — PROGRESS NOTE ADULT - SUBJECTIVE AND OBJECTIVE BOX
Patient is a 59y old  Male who presents with a chief complaint of Chest pain (21 Aug 2019 09:34)      SUBJ:  Patient seen and examined. Cath reviewed. Severe 3 vessel disease with occluded grafts. Systolic dysfunction.      MEDICATIONS  (STANDING):  ALBUTerol/ipratropium (CFC free) Inhaler. 1 Puff(s) Inhalation every 4 hours  aspirin  chewable 81 milliGRAM(s) Enteral Tube daily  atorvastatin 80 milliGRAM(s) Oral at bedtime  chlorhexidine 4% Liquid 1 Application(s) Topical daily  clopidogrel Tablet 75 milliGRAM(s) Oral daily  docusate sodium 100 milliGRAM(s) Oral daily  furosemide    Tablet 40 milliGRAM(s) Oral two times a day  heparin  Injectable 5000 Unit(s) SubCutaneous every 8 hours  isosorbide   dinitrate Tablet (ISORDIL) 10 milliGRAM(s) Oral two times a day  pantoprazole   Suspension 40 milliGRAM(s) Enteral Tube daily  senna 1 Tablet(s) Oral at bedtime  sodium chloride 0.9%. 1000 milliLiter(s) (50 mL/Hr) IV Continuous <Continuous>    MEDICATIONS  (PRN):  acetaminophen   Tablet .. 650 milliGRAM(s) Oral every 6 hours PRN Temp greater or equal to 38C (100.4F)  ALBUTerol/ipratropium (CFC free) Inhaler. 1 Puff(s) Inhalation every 4 hours PRN Wheezing            Vital Signs Last 24 Hrs  T(C): 35.8 (21 Aug 2019 05:11), Max: 37.1 (20 Aug 2019 20:00)  T(F): 96.4 (21 Aug 2019 05:11), Max: 98.8 (20 Aug 2019 20:00)  HR: 92 (21 Aug 2019 05:11) (92 - 111)  BP: 129/84 (21 Aug 2019 05:11) (129/84 - 157/82)  BP(mean): --  RR: 18 (20 Aug 2019 20:00) (18 - 18)  SpO2: --      PHYSICAL EXAM:    GEN: AAO x 3, NAD  HEENT: NC/AT, PERRL  Neck: No JVD, no bruits  CV: Reg, S1-S2, no murmur  Lungs: CTAB  Abd: Soft, non-tender  Ext: No edema      I&O's Summary    20 Aug 2019 07:01  -  21 Aug 2019 07:00  --------------------------------------------------------  IN: 0 mL / OUT: 100 mL / NET: -100 mL    	    LABS:                    BNP  RADIOLOGY & ADDITIONAL STUDIES:      IMPRESSION AND PLAN:

## 2019-08-21 NOTE — CHART NOTE - NSCHARTNOTEFT_GEN_A_CORE
PRE-OP DIAGNOSIS: suspected CAD    PROCEDURE: [facility] with coronary angiography    Physician:  Assistant: Yoana    ANESTHESIA TYPE:  [  ] General Anesthesia  [x] Sedation  [x] Local/Regional    ESTIMATED BLOOD LOSS:     10 mL    CONDITION  [  ] Critical  [  ] Serious  [  ] Fair  [  ] Good    Pre-test risk assessment:  Previous MI [ ]  CVA/TIA history [ ]  Cardiac Arrest [ ]  Anoxic Brain INjury [ ] (only if Acute MI, cardiac arrest, AND coma)  Cardiogenic shock [ ] (CI <2.2 and/or SBP <90 while on inotropic or mechanical support  Refractory cardiogenic shock [ ] (CI <2.0 and/or SBP <80 despite use of inotropic/ pressor support)  Peripheral Vascular disease [ ]  Heart Failure (current) [ ]  Heart Failure (past) [ ]  Malignant Ventricular arrhythmia [ ]  Chronic Lung disease [ ]  Diabetes Mellitus [ ]  Renal Failure on Dialysis [ ]  Previous CABG surgery [ ]  Emergency PCI [ ]  Stent thrombosis [ ]  Any previous organ transplant [ ]  High Bleeding risk [ ]      SPECIMENS REMOVED (IF APPLICABLE): N/A      IV CONTRAST:     mL      IMPLANTS (IF APPLICABLE)      FINDINGS    Left Heart Catheterization:  LVEF%: 25% from Echo  LVEDP:   [ ] Normal Coronary Arteries  [ ] Luminal Irregularities  [ ] Non-obstructive CAD      LEFT HEART CATHETERIZATION                                    Left Main: distal LM 70% discrete     LAD: prox LAD 80% diffuse disease, mid % , distal LAD collaterals from ramus                      Diag: patient    Left Circumflex: ostial 90% lesion, 70% mid circ in-stent restenosis, distal circ small vessel    Right Coronary Artery: prox % lesion distal collaterals from circ        Ramus Intermed: large vessel, ostial 80% lesion    Grafts  SVG to RCA: occluded  SVG to Diagonal: occluded  SVG to OM: occluded  LIMA to LAD: atretic and occluded     DOMINANCE: Right    ACCESS: R femoral artery  CLOSURE: Angioseal    INTERVENTION  IMPLANTS: None      POST-OP DIAGNOSIS  3V disease, all 4 grafts occluded     PLAN OF CARE  [x] Further MRI viability study, possible bypass after study  [x] Return to In-patient bed  [x] Continue DAPT, B-blocker & Statin therapy PRE-OP DIAGNOSIS: suspected CAD    PROCEDURE: [facility] with coronary angiography    Physician: Jenaro  Assistant: Erick Valencia Shwe    ANESTHESIA TYPE:  [  ] General Anesthesia  [x] Sedation  [x] Local/Regional    ESTIMATED BLOOD LOSS:     10 mL    CONDITION  [  ] Critical  [  ] Serious  [x] Fair  [  ] Good    SPECIMENS REMOVED (IF APPLICABLE): N/A      IV CONTRAST:    90 mL      IMPLANTS (IF APPLICABLE)      FINDINGS    Left Heart Catheterization:  LVEF%: 25% from Echo  LVEDP: moderate elevation    LEFT HEART CATHETERIZATION                                    Left Main: distal LM 70% discrete     LAD: prox LAD 80% diffuse disease, mid % , distal LAD collaterals from ramus                      Diag: patient    Left Circumflex: ostial 90% lesion, 70% mid circ in-stent restenosis, distal circ small vessel    Right Coronary Artery: prox % lesion distal collaterals from circ        Ramus Intermed: large vessel, ostial 80% lesion    Grafts  SVG to RCA: occluded  SVG to Diagonal: occluded  SVG to OM: occluded  LIMA to LAD: atretic and occluded     DOMINANCE: Right    ACCESS: R femoral artery  CLOSURE: Angioseal    INTERVENTION  IMPLANTS: None    POST-OP DIAGNOSIS  3V disease, all 4 grafts occluded     PLAN OF CARE  [x] Further MRI viability study, possible bypass after study  [x] Return to In-patient bed  [x] Continue DAPT, B-blocker & Statin therapy PRE-OP DIAGNOSIS: suspected CAD    PROCEDURE: [facility] with coronary angiography    Physician: Jenaro  Assistant: Erick Valencia Shwe    ANESTHESIA TYPE:  [  ] General Anesthesia  [x] Sedation  [x] Local/Regional    ESTIMATED BLOOD LOSS:     10 mL    CONDITION  [  ] Critical  [  ] Serious  [x] Fair  [  ] Good    SPECIMENS REMOVED (IF APPLICABLE): N/A      IV CONTRAST:    90 mL      IMPLANTS (IF APPLICABLE)      FINDINGS:    Left Heart Catheterization:  LVEF%: 25% from Echo  LVEDP: moderate elevation    LEFT HEART CATHETERIZATION                                    Left Main: distal LM 70% discrete     LAD: prox LAD 80% diffuse disease, mid % , distal LAD collaterals from ramus                      Diag: patent    Left Circumflex: ostial 90% lesion, 70% mid circ in-stent restenosis, distal circ small vessel    Right Coronary Artery: prox % lesion distal collaterals from circ        Ramus Intermedius: large vessel, ostial 80% lesion      Grafts:  SVG to RCA: occluded  SVG to Diagonal: occluded  SVG to OM: occluded  LIMA to LAD: atretic and occluded   VANCE - not used    DOMINANCE: Right    ACCESS: R femoral artery  CLOSURE: Angioseal    INTERVENTION  IMPLANTS: None    POST-OP DIAGNOSIS  3V disease, all 4 grafts occluded     PLAN OF CARE  [x] Further MRI viability study, possible bypass after study  [x] Return to In-patient bed  [x] Continue DAPT, B-blocker & Statin therapy

## 2019-08-21 NOTE — PROGRESS NOTE ADULT - ASSESSMENT
A 60 yo M w/ PMH of COPD (smoke 4 packs/day for over 30 years), CAD s/p CABG, DM and hx of illicit drug use presents to the ED unresponsive. 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 Shortly after, pt asked for ambulance and was intubated on site.   In the ED, trauma workup was negative for head trauma and PE was r/o via CTA, CRX showed mild pulm edema. He was started on low dose of versed, azithromycin and ceftriaxone, duoneb and solumedrol 125 mg x 1 and pt was admitted to ICU.    #RS failure  -Breathing RA, no distress  -CXR no cardiopulm disease, no consolidation   -LASIX 40 IV Q 12H, albuterol/ipratropium q4h  -Off steroids  -Monitor Is & Os    #CHF AND ACS  -Pending medical rec of ECHO from [facility]   -EF 20-25%, pBNP 640  -c/w ASA and statin, plavix  -BP wnl: restart BB and ACEi  -Cath on tomorrow with Dr Eason  -NPO after breakfast  -Pending PT c/s    #pneumonia:  -Bl cx NTD  -Follow ID rec  -Monitor Is & Os    #DM: monitor FS    Diet: NPO after breakfast tomorrow for cath  DVT ppx: UFH  GI prophylaxis. protonix A 58 yo M w/ PMH of COPD (smoke 4 packs/day for over 30 years), CAD s/p CABG, DM and hx of illicit drug use presents to the ED unresponsive. 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 Shortly after, pt asked for ambulance and was intubated on site.   In the ED, trauma workup was negative for head trauma and PE was r/o via CTA, CRX showed mild pulm edema. He was started on low dose of versed, azithromycin and ceftriaxone, duoneb and solumedrol 125 mg x 1 and pt was admitted to ICU.    #RS failure  -Breathing RA, no distress  -CXR no cardiopulm disease, no consolidation   -LASIX 40 IV Q 12H, albuterol/ipratropium q4h  -Off steroids  -Monitor Is & Os    #CHF AND ACS  -Pending medical rec of ECHO from [facility]   -EF 20-25%, pBNP 640  -c/w ASA and statin, plavix  -BP wnl: restart BB and ACEi  -Cath today with Dr Eason  -NPO after breakfast  -Working with PT    #pneumonia:  -Bl cx NTD, afebrile, no wbc  -Monitor Is & Os    #DM: monitor FS    Diet: NPO after breakfast today for cath  DVT ppx: UFH  GI prophylaxis. protonix

## 2019-08-21 NOTE — PROGRESS NOTE ADULT - SUBJECTIVE AND OBJECTIVE BOX
Hospital Day:  10d    Subjective:    Patient is a 59y old  Male who presents with a chief complaint of Chest pain (20 Aug 2019 12:51)      Past Medical Hx:   COPD (chronic obstructive pulmonary disease)  DM (diabetes mellitus)  CAD (coronary artery disease) of bypass graft    Past Sx:  S/P CABG (coronary artery bypass graft)    Allergies:  No Known Allergies    Current Meds:   Standng Meds:  ALBUTerol/ipratropium (CFC free) Inhaler. 1 Puff(s) Inhalation every 4 hours  aspirin  chewable 81 milliGRAM(s) Enteral Tube daily  atorvastatin 80 milliGRAM(s) Oral at bedtime  chlorhexidine 4% Liquid 1 Application(s) Topical daily  clopidogrel Tablet 75 milliGRAM(s) Oral daily  docusate sodium 100 milliGRAM(s) Oral daily  furosemide    Tablet 40 milliGRAM(s) Oral two times a day  heparin  Injectable 5000 Unit(s) SubCutaneous every 8 hours  isosorbide   dinitrate Tablet (ISORDIL) 10 milliGRAM(s) Oral two times a day  pantoprazole   Suspension 40 milliGRAM(s) Enteral Tube daily  senna 1 Tablet(s) Oral at bedtime    PRN Meds:  acetaminophen   Tablet .. 650 milliGRAM(s) Oral every 6 hours PRN Temp greater or equal to 38C (100.4F)  ALBUTerol/ipratropium (CFC free) Inhaler. 1 Puff(s) Inhalation every 4 hours PRN Wheezing    HOME MEDICATIONS:  atorvastatin 80 mg oral tablet: 1 tab(s) orally once a day  carvedilol 25 mg oral tablet: 1 tab(s) orally 2 times a day  furosemide 40 mg oral tablet: 1 tab(s) orally once a day  isosorbide mononitrate 120 mg oral tablet, extended release: 1 tab(s) orally once a day (in the morning)  lisinopril 40 mg oral tablet: 1 tab(s) orally once a day  Low Dose ASA 81 mg oral tablet: 1 tab(s) orally once a day      Vital Signs:   T(F): 96.4 (08-21-19 @ 05:11), Max: 98.8 (08-20-19 @ 20:00)  HR: 92 (08-21-19 @ 05:11) (91 - 111)  BP: 129/84 (08-21-19 @ 05:11) (129/84 - 157/82)  RR: 18 (08-20-19 @ 20:00) (18 - 18)  SpO2: --      08-20-19 @ 07:01  -  08-21-19 @ 07:00  --------------------------------------------------------  IN: 0 mL / OUT: 100 mL / NET: -100 mL        Physical Exam:   GENERAL: NAD  HEENT: NCAT  CHEST/LUNG: CTAB  HEART: Regular rate and rhythm; s1 s2 appreciated, No murmurs, rubs, or gallops  ABDOMEN: Soft, Nontender, Nondistended; Bowel sounds present  EXTREMITIES: No LE edema b/l  NERVOUS SYSTEM:  Alert & Oriented X3        Labs:                     Serum Pro-Brain Natriuretic Peptide: 391 pg/mL (08-16-19 @ 04:37)  Serum Pro-Brain Natriuretic Peptide: 485 pg/mL (08-14-19 @ 04:30)  Serum Pro-Brain Natriuretic Peptide: 640 pg/mL (08-13-19 @ 04:55)    Troponin 0.51, CKMB --,  08-18-19 @ 04:27 Hospital Day:  10d    Subjective:    Patient is a 59y old  Male who presents with a chief complaint of Chest pain (20 Aug 20      Past Medical Hx:   COPD (chronic obstructive pulmonary disease)  DM (diabetes mellitus)  CAD (coronary artery disease) of bypass graft    Past Sx:  S/P CABG (coronary artery bypass graft)    Allergies:  No Known Allergies    Current Meds:   Standng Meds:  ALBUTerol/ipratropium (CFC free) Inhaler. 1 Puff(s) Inhalation every 4 hours  aspirin  chewable 81 milliGRAM(s) Enteral Tube daily  atorvastatin 80 milliGRAM(s) Oral at bedtime  chlorhexidine 4% Liquid 1 Application(s) Topical daily  clopidogrel Tablet 75 milliGRAM(s) Oral daily  docusate sodium 100 milliGRAM(s) Oral daily  furosemide    Tablet 40 milliGRAM(s) Oral two times a day  heparin  Injectable 5000 Unit(s) SubCutaneous every 8 hours  isosorbide   dinitrate Tablet (ISORDIL) 10 milliGRAM(s) Oral two times a day  pantoprazole   Suspension 40 milliGRAM(s) Enteral Tube daily  senna 1 Tablet(s) Oral at bedtime    PRN Meds:  acetaminophen   Tablet .. 650 milliGRAM(s) Oral every 6 hours PRN Temp greater or equal to 38C (100.4F)  ALBUTerol/ipratropium (CFC free) Inhaler. 1 Puff(s) Inhalation every 4 hours PRN Wheezing    HOME MEDICATIONS:  atorvastatin 80 mg oral tablet: 1 tab(s) orally once a day  carvedilol 25 mg oral tablet: 1 tab(s) orally 2 times a day  furosemide 40 mg oral tablet: 1 tab(s) orally once a day  isosorbide mononitrate 120 mg oral tablet, extended release: 1 tab(s) orally once a day (in the morning)  lisinopril 40 mg oral tablet: 1 tab(s) orally once a day  Low Dose ASA 81 mg oral tablet: 1 tab(s) orally once a day      Vital Signs:   T(F): 96.4 (08-21-19 @ 05:11), Max: 98.8 (08-20-19 @ 20:00)  HR: 92 (08-21-19 @ 05:11) (91 - 111)  BP: 129/84 (08-21-19 @ 05:11) (129/84 - 157/82)  RR: 18 (08-20-19 @ 20:00) (18 - 18)  SpO2: --      08-20-19 @ 07:01  -  08-21-19 @ 07:00  --------------------------------------------------------  IN: 0 mL / OUT: 100 mL / NET: -100 mL        Physical Exam:   GENERAL: NAD  HEENT: NCAT  CHEST/LUNG: CTAB  HEART: Regular rate and rhythm; s1 s2 appreciated, No murmurs, rubs, or gallops  ABDOMEN: Soft, Nontender, Nondistended; Bowel sounds present  EXTREMITIES: No LE edema b/l  NERVOUS SYSTEM:  Alert & Oriented X3        Labs:                     Serum Pro-Brain Natriuretic Peptide: 391 pg/mL (08-16-19 @ 04:37)  Serum Pro-Brain Natriuretic Peptide: 485 pg/mL (08-14-19 @ 04:30)  Serum Pro-Brain Natriuretic Peptide: 640 pg/mL (08-13-19 @ 04:55)    Troponin 0.51, CKMB --,  08-18-19 @ 04:27 Hospital Day:  10d    Subjective:    Patient is a 59y old  Male who presents with a chief complaint of Chest pain. No acute events overnight and in no distress this am, offers no physical complaints.       Past Medical Hx:   COPD (chronic obstructive pulmonary disease)  DM (diabetes mellitus)  CAD (coronary artery disease) of bypass graft    Past Sx:  S/P CABG (coronary artery bypass graft)    Allergies:  No Known Allergies    Current Meds:   Standng Meds:  ALBUTerol/ipratropium (CFC free) Inhaler. 1 Puff(s) Inhalation every 4 hours  aspirin  chewable 81 milliGRAM(s) Enteral Tube daily  atorvastatin 80 milliGRAM(s) Oral at bedtime  chlorhexidine 4% Liquid 1 Application(s) Topical daily  clopidogrel Tablet 75 milliGRAM(s) Oral daily  docusate sodium 100 milliGRAM(s) Oral daily  furosemide    Tablet 40 milliGRAM(s) Oral two times a day  heparin  Injectable 5000 Unit(s) SubCutaneous every 8 hours  isosorbide   dinitrate Tablet (ISORDIL) 10 milliGRAM(s) Oral two times a day  pantoprazole   Suspension 40 milliGRAM(s) Enteral Tube daily  senna 1 Tablet(s) Oral at bedtime    PRN Meds:  acetaminophen   Tablet .. 650 milliGRAM(s) Oral every 6 hours PRN Temp greater or equal to 38C (100.4F)  ALBUTerol/ipratropium (CFC free) Inhaler. 1 Puff(s) Inhalation every 4 hours PRN Wheezing    HOME MEDICATIONS:  atorvastatin 80 mg oral tablet: 1 tab(s) orally once a day  carvedilol 25 mg oral tablet: 1 tab(s) orally 2 times a day  furosemide 40 mg oral tablet: 1 tab(s) orally once a day  isosorbide mononitrate 120 mg oral tablet, extended release: 1 tab(s) orally once a day (in the morning)  lisinopril 40 mg oral tablet: 1 tab(s) orally once a day  Low Dose ASA 81 mg oral tablet: 1 tab(s) orally once a day      Vital Signs:   T(F): 96.4 (08-21-19 @ 05:11), Max: 98.8 (08-20-19 @ 20:00)  HR: 92 (08-21-19 @ 05:11) (91 - 111)  BP: 129/84 (08-21-19 @ 05:11) (129/84 - 157/82)  RR: 18 (08-20-19 @ 20:00) (18 - 18)  SpO2: --      08-20-19 @ 07:01  -  08-21-19 @ 07:00  --------------------------------------------------------  IN: 0 mL / OUT: 100 mL / NET: -100 mL        Physical Exam:   GENERAL: NAD  HEENT: NCAT  CHEST/LUNG: CTAB  HEART: Regular rate and rhythm; s1 s2 appreciated, No murmurs, rubs, or gallops  ABDOMEN: Soft, Nontender, Nondistended; Bowel sounds present  EXTREMITIES: No LE edema b/l  NERVOUS SYSTEM:  Alert & Oriented X3        Labs:         Serum Pro-Brain Natriuretic Peptide: 391 pg/mL (08-16-19 @ 04:37)  Serum Pro-Brain Natriuretic Peptide: 485 pg/mL (08-14-19 @ 04:30)  Serum Pro-Brain Natriuretic Peptide: 640 pg/mL (08-13-19 @ 04:55)    Troponin 0.51, CKMB --,  08-18-19 @ 04:27

## 2019-08-22 LAB
ANION GAP SERPL CALC-SCNC: 17 MMOL/L — HIGH (ref 7–14)
BASOPHILS # BLD AUTO: 0.02 K/UL — SIGNIFICANT CHANGE UP (ref 0–0.2)
BASOPHILS NFR BLD AUTO: 0.2 % — SIGNIFICANT CHANGE UP (ref 0–1)
BUN SERPL-MCNC: 24 MG/DL — HIGH (ref 10–20)
CALCIUM SERPL-MCNC: 9.7 MG/DL — SIGNIFICANT CHANGE UP (ref 8.5–10.1)
CHLORIDE SERPL-SCNC: 97 MMOL/L — LOW (ref 98–110)
CO2 SERPL-SCNC: 25 MMOL/L — SIGNIFICANT CHANGE UP (ref 17–32)
CREAT SERPL-MCNC: 0.9 MG/DL — SIGNIFICANT CHANGE UP (ref 0.7–1.5)
EOSINOPHIL # BLD AUTO: 0.08 K/UL — SIGNIFICANT CHANGE UP (ref 0–0.7)
EOSINOPHIL NFR BLD AUTO: 0.6 % — SIGNIFICANT CHANGE UP (ref 0–8)
GLUCOSE SERPL-MCNC: 162 MG/DL — HIGH (ref 70–99)
HCT VFR BLD CALC: 48.7 % — SIGNIFICANT CHANGE UP (ref 42–52)
HGB BLD-MCNC: 16.3 G/DL — SIGNIFICANT CHANGE UP (ref 14–18)
IMM GRANULOCYTES NFR BLD AUTO: 0.5 % — HIGH (ref 0.1–0.3)
LYMPHOCYTES # BLD AUTO: 1.67 K/UL — SIGNIFICANT CHANGE UP (ref 1.2–3.4)
LYMPHOCYTES # BLD AUTO: 13.3 % — LOW (ref 20.5–51.1)
MCHC RBC-ENTMCNC: 29.6 PG — SIGNIFICANT CHANGE UP (ref 27–31)
MCHC RBC-ENTMCNC: 33.5 G/DL — SIGNIFICANT CHANGE UP (ref 32–37)
MCV RBC AUTO: 88.4 FL — SIGNIFICANT CHANGE UP (ref 80–94)
MONOCYTES # BLD AUTO: 0.97 K/UL — HIGH (ref 0.1–0.6)
MONOCYTES NFR BLD AUTO: 7.7 % — SIGNIFICANT CHANGE UP (ref 1.7–9.3)
NEUTROPHILS # BLD AUTO: 9.76 K/UL — HIGH (ref 1.4–6.5)
NEUTROPHILS NFR BLD AUTO: 77.7 % — HIGH (ref 42.2–75.2)
NRBC # BLD: 0 /100 WBCS — SIGNIFICANT CHANGE UP (ref 0–0)
PLATELET # BLD AUTO: 215 K/UL — SIGNIFICANT CHANGE UP (ref 130–400)
POTASSIUM SERPL-MCNC: 3.5 MMOL/L — SIGNIFICANT CHANGE UP (ref 3.5–5)
POTASSIUM SERPL-SCNC: 3.5 MMOL/L — SIGNIFICANT CHANGE UP (ref 3.5–5)
RBC # BLD: 5.51 M/UL — SIGNIFICANT CHANGE UP (ref 4.7–6.1)
RBC # FLD: 13.2 % — SIGNIFICANT CHANGE UP (ref 11.5–14.5)
SODIUM SERPL-SCNC: 139 MMOL/L — SIGNIFICANT CHANGE UP (ref 135–146)
WBC # BLD: 12.56 K/UL — HIGH (ref 4.8–10.8)
WBC # FLD AUTO: 12.56 K/UL — HIGH (ref 4.8–10.8)

## 2019-08-22 PROCEDURE — 99232 SBSQ HOSP IP/OBS MODERATE 35: CPT

## 2019-08-22 PROCEDURE — 99233 SBSQ HOSP IP/OBS HIGH 50: CPT

## 2019-08-22 RX ADMIN — HEPARIN SODIUM 5000 UNIT(S): 5000 INJECTION INTRAVENOUS; SUBCUTANEOUS at 06:03

## 2019-08-22 RX ADMIN — Medication 40 MILLIGRAM(S): at 06:03

## 2019-08-22 RX ADMIN — Medication 40 MILLIGRAM(S): at 18:39

## 2019-08-22 RX ADMIN — HEPARIN SODIUM 5000 UNIT(S): 5000 INJECTION INTRAVENOUS; SUBCUTANEOUS at 22:33

## 2019-08-22 RX ADMIN — Medication 81 MILLIGRAM(S): at 15:23

## 2019-08-22 RX ADMIN — ATORVASTATIN CALCIUM 80 MILLIGRAM(S): 80 TABLET, FILM COATED ORAL at 22:33

## 2019-08-22 RX ADMIN — ISOSORBIDE DINITRATE 10 MILLIGRAM(S): 5 TABLET ORAL at 18:39

## 2019-08-22 RX ADMIN — Medication 12.5 MILLIGRAM(S): at 06:04

## 2019-08-22 RX ADMIN — PANTOPRAZOLE SODIUM 40 MILLIGRAM(S): 20 TABLET, DELAYED RELEASE ORAL at 15:09

## 2019-08-22 RX ADMIN — CHLORHEXIDINE GLUCONATE 1 APPLICATION(S): 213 SOLUTION TOPICAL at 06:03

## 2019-08-22 RX ADMIN — CLOPIDOGREL BISULFATE 75 MILLIGRAM(S): 75 TABLET, FILM COATED ORAL at 15:23

## 2019-08-22 RX ADMIN — SACUBITRIL AND VALSARTAN 1 TABLET(S): 24; 26 TABLET, FILM COATED ORAL at 18:39

## 2019-08-22 RX ADMIN — HEPARIN SODIUM 5000 UNIT(S): 5000 INJECTION INTRAVENOUS; SUBCUTANEOUS at 15:09

## 2019-08-22 RX ADMIN — Medication 12.5 MILLIGRAM(S): at 22:33

## 2019-08-22 RX ADMIN — ISOSORBIDE DINITRATE 10 MILLIGRAM(S): 5 TABLET ORAL at 06:04

## 2019-08-22 RX ADMIN — SACUBITRIL AND VALSARTAN 1 TABLET(S): 24; 26 TABLET, FILM COATED ORAL at 06:04

## 2019-08-22 NOTE — CONSULT NOTE ADULT - ASSESSMENT
58 yo M w/ PMH of COPD (smoke 5 packs/day for over 30 years), CAD s/p CABG, DM and hx of illicit drug use presents to the ED unresponsive, found to have acute respiratory failure, hospital course complicated by RLL PNA,, NSTEMI and ADHF s/p intubation/extubation and cardiac cath that showed severe triple vessel disease with occluded grafts to RCA, and LCX and occluded LIMA LAD, being seen by EP for consideration of life vest.    Impression:    CAD  Ischemic cardiomyopathy (EF 20-25%), with occluded grafts to RCA, and LCX and occluded LIMA LAD (HFrEF)    Recommend:    - start GDMT for CAD, and ischemic cardiomyopathy (beta-blocker, ACEi/ARB/ARNI, spironolactone, DAPT, and statin)  - active 5 PPD smoker, and known to have long standing NICM  - agrees with life vest for now, and wants to follow up with his Cardiologist at [facility]  - Poor prognosis, considering all grafts are occluded, and pt. understands the poor prognosis, and does not want any further surgical treatment  - will provide information to follow up as outpatient 60 yo M w/ PMH of COPD (smoke 5 packs/day for over 30 years), CAD s/p CABG, DM and hx of illicit drug use presents to the ED unresponsive, found to have acute respiratory failure, hospital course complicated by RLL PNA,, NSTEMI and ADHF s/p intubation/extubation and cardiac cath that showed severe triple vessel disease with occluded grafts to RCA, and LCX and occluded LIMA LAD, being seen by EP for consideration of life vest.    Impression:    CAD  Ischemic cardiomyopathy (EF 20-25%), with occluded grafts to RCA, and LCX and occluded LIMA LAD (HFrEF)  Heavy smoker till present time    Recommend:  life vest before discharge  - start GDMT for CAD, and ischemic cardiomyopathy (beta-blocker, ACEi/ARB/ARNI, spironolactone, DAPT, and statin)  - active 5 PPD smoker, and known to have long standing NICM  - agrees with life vest when discharger now, and wants to follow up with his Cardiologist at [facility]  - Poor prognosis, considering severity of  coronary artery  disease  and alll grafts are occluded, and  his refusal for any further intervention   F/U  in 90 deys  for possible  ICD  -

## 2019-08-22 NOTE — CONSULT NOTE ADULT - SUBJECTIVE AND OBJECTIVE BOX
Date of Admission:    CHIEF COMPLAINT:    HISTORY OF PRESENT ILLNESS: 59yMale with PMH below presented to the hospital for     PAST MEDICAL & SURGICAL HISTORY:  COPD (chronic obstructive pulmonary disease)  DM (diabetes mellitus)  CAD (coronary artery disease) of bypass graft  S/P CABG (coronary artery bypass graft)      FAMILY HISTORY:  [ ] no pertinent family history of premature cardiovascular disease in first degree relatives.  Mother:   Father:   Siblings:     SOCIAL HISTORY:    [ ] Non-smoker  [ ] Smoker  [ ] Alcohol    Allergies    No Known Allergies    Intolerances    	    REVIEW OF SYSTEMS:  CONSTITUTIONAL: No fever, weight loss, or fatigue  CARDIOLOGY: PAtient denies chest pain, shortness of breath or syncopal episodes.   RESPIRATORY: denies shortness of breath, wheezeing.   NEUROLOGICAL: NO weakness, no focal deficits to report.  ENDOCRINOLOGICAL: no recent change in diabetic medications.   GI: no BRBPR, no N,V,diarrhea.    PSYCHIATRY: normal mood and affect  HEENT: no nasal discharge, no ecchymosis  SKIN: no ecchymosis, no breakdown  MUSCULOSKELETAL: Full range of motion x4.     PHYSICAL EXAM:  T(C): 35.6 (08-22-19 @ 05:24), Max: 37 (08-21-19 @ 22:00)  HR: 99 (08-22-19 @ 08:33) (84 - 101)  BP: 113/58 (08-22-19 @ 08:33) (92/62 - 151/80)  RR: 18 (08-22-19 @ 05:24) (18 - 18)  SpO2: 96% (08-22-19 @ 08:33) (96% - 96%)  Wt(kg): --  I&O's Summary      General Appearance: well appearing, normal for age and gender. 	  Neck: normal JVP, no bruit.   Eyes: No xanthomalasia, Extra Ocular muscles intact.   Cardiovascular: regular rate and rhythm S1 S2, No JVD, No murmurs, No edema  Respiratory: Lungs clear to auscultation	  Psychiatry: Alert and oriented x 3, Mood & affect appropriate  Gastrointestinal:  Soft, Non-tender  Skin/Integumen: No rashes, No ecchymoses, No cyanosis	  Neurologic: Non-focal  Musculoskeletal/ extremities: Normal range of motion, No clubbing, cyanosis or edema  Vascular: Peripheral pulses palpable 2+ bilaterally    LABS:	 	                          16.3   12.56 )-----------( 215      ( 22 Aug 2019 06:57 )             48.7     08-22    139  |  97<L>  |  24<H>  ----------------------------<  162<H>  3.5   |  25  |  0.9    Ca    9.7      22 Aug 2019 06:57              CARDIAC MARKERS:            TELEMETRY EVENTS: 	    ECG:  	  RADIOLOGY:  OTHER: 	    PREVIOUS DIAGNOSTIC TESTING:    [ ] Echocardiogram:  [ ]  Catheterization:  [ ] Stress Test:  	  	    Home Medications:  atorvastatin 80 mg oral tablet: 1 tab(s) orally once a day (11 Aug 2019 15:55)  carvedilol 25 mg oral tablet: 1 tab(s) orally 2 times a day (11 Aug 2019 15:54)  furosemide 40 mg oral tablet: 1 tab(s) orally once a day (11 Aug 2019 15:53)  isosorbide mononitrate 120 mg oral tablet, extended release: 1 tab(s) orally once a day (in the morning) (11 Aug 2019 15:54)  lisinopril 40 mg oral tablet: 1 tab(s) orally once a day (11 Aug 2019 15:54)  Low Dose ASA 81 mg oral tablet: 1 tab(s) orally once a day (11 Aug 2019 15:54)    MEDICATIONS  (STANDING):  ALBUTerol/ipratropium (CFC free) Inhaler. 1 Puff(s) Inhalation every 4 hours  aspirin  chewable 81 milliGRAM(s) Enteral Tube daily  atorvastatin 80 milliGRAM(s) Oral at bedtime  chlorhexidine 4% Liquid 1 Application(s) Topical daily  clopidogrel Tablet 75 milliGRAM(s) Oral daily  docusate sodium 100 milliGRAM(s) Oral daily  furosemide    Tablet 40 milliGRAM(s) Oral two times a day  heparin  Injectable 5000 Unit(s) SubCutaneous every 8 hours  isosorbide   dinitrate Tablet (ISORDIL) 10 milliGRAM(s) Oral two times a day  metoprolol tartrate 12.5 milliGRAM(s) Oral two times a day  pantoprazole   Suspension 40 milliGRAM(s) Enteral Tube daily  sacubitril 24 mG/valsartan 26 mG 1 Tablet(s) Oral two times a day  senna 1 Tablet(s) Oral at bedtime  sodium chloride 0.9%. 1000 milliLiter(s) (50 mL/Hr) IV Continuous <Continuous>    MEDICATIONS  (PRN):  acetaminophen   Tablet .. 650 milliGRAM(s) Oral every 6 hours PRN Temp greater or equal to 38C (100.4F)  ALBUTerol/ipratropium (CFC free) Inhaler. 1 Puff(s) Inhalation every 4 hours PRN Wheezing Date of Admission: 8/11/2019    CHIEF COMPLAINT:    HISTORY OF PRESENT ILLNESS: A 60 yo M w/ PMH of COPD (smoke 5 packs/day for over 30 years), CAD s/p CABG, DM and hx of illicit drug use presents to the ED unresponsive. 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 Shortly after, pt asked for ambulance and was intubated on site.     In the ED, trauma workup was negative for head trauma and PE was r/o via CTA, CRX showed mild pulm edema. He was started on low dose of versed, azithromycin and ceftriaxone, duoneb and solumedrol 125 mg x 1 and pt was admitted to ICU.     Cardiology HPI:  Hosptial course complicated by RLL PNA, NSTEMI, and ADHF s/p intubation on admission, and extubation. s/p cardiac cath that showed occluded bypass grafts and native vessels, being seen by EP for life vest/AICD placement.   Denies any chest pain, sob, palpitations, orthopnea, pnd, syncope/loc    PAST MEDICAL & SURGICAL HISTORY:  COPD (chronic obstructive pulmonary disease)  DM (diabetes mellitus)  CAD (coronary artery disease) of bypass graft  S/P CABG (coronary artery bypass graft)  [place]    FAMILY HISTORY:  [x ] no pertinent family history of premature cardiovascular disease in first degree relatives.  Mother:   Father:   Siblings:     SOCIAL HISTORY:    [ ] Non-smoker  [x ] Smoker 5PPD   [ ] Alcohol    Allergies    No Known Allergies    Intolerances    	    REVIEW OF SYSTEMS:  CONSTITUTIONAL: No fever, weight loss, or fatigue  CARDIOLOGY: PAtient denies chest pain, shortness of breath or syncopal episodes.   RESPIRATORY: denies shortness of breath, wheezeing.   NEUROLOGICAL: NO weakness, no focal deficits to report.  ENDOCRINOLOGICAL: no recent change in diabetic medications.   GI: no BRBPR, no N,V,diarrhea.    PSYCHIATRY: normal mood and affect  HEENT: no nasal discharge, no ecchymosis  SKIN: no ecchymosis, no breakdown  MUSCULOSKELETAL: Full range of motion x4.     PHYSICAL EXAM:  T(C): 35.6 (08-22-19 @ 05:24), Max: 37 (08-21-19 @ 22:00)  HR: 99 (08-22-19 @ 08:33) (84 - 101)  BP: 113/58 (08-22-19 @ 08:33) (92/62 - 151/80)  RR: 18 (08-22-19 @ 05:24) (18 - 18)  SpO2: 96% (08-22-19 @ 08:33) (96% - 96%)  Wt(kg): --  I&O's Summary      General Appearance: well appearing, normal for age and gender. 	  Neck: normal JVP, no bruit.   Eyes: No xanthomalasia, Extra Ocular muscles intact.   Cardiovascular: regular rate and rhythm S1 S2, No JVD, No murmurs, No edema  Respiratory: Lungs clear to auscultation	  Psychiatry: Alert and oriented x 3, Mood & affect appropriate  Gastrointestinal:  Soft, Non-tender  Skin/Integumen: No rashes, No ecchymoses, No cyanosis	  Neurologic: Non-focal  Musculoskeletal/ extremities: Normal range of motion, No clubbing, cyanosis or edema  Vascular: Peripheral pulses palpable 2+ bilaterally    LABS:	 	                          16.3   12.56 )-----------( 215      ( 22 Aug 2019 06:57 )             48.7     08-22    139  |  97<L>  |  24<H>  ----------------------------<  162<H>  3.5   |  25  |  0.9    Ca    9.7      22 Aug 2019 06:57              CARDIAC MARKERS:    Trop T  0.51 <-- 0.66 <-- 1.42      TELEMETRY EVENTS: 	  None  ECG:  	NSR @62bpm, inferior Q waves      RADIOLOGY:  CXR:  	    PREVIOUS DIAGNOSTIC TESTING:    [x ] Echocardiogram:  < from: Transthoracic Echocardiogram (08.14.19 @ 14:08) >   1. Left ventricular ejection fraction, by visual estimation,is 20 to   25%.   2. Spectral Doppler shows pseudonormal pattern of left ventricular   myocardial filling (Grade II diastolic dysfunction).   3. Mitral annular calcification.   4. Sclerotic aortic valve with normal opening.   5. Mild dilatation of the aortic root.   6. Estimated pulmonary artery systolic pressure is 47.1 mmHg assuming a   right atrial pressure of 15 mmHg, which is consistent with mild pulmonary   hypertension.   7. LAD distribution, entire septum, and entire apex are abnormal as   described above.      [x ]  Catheterization: 8/21/19  CORONARY CIRCULATION: The coronary circulation is right dominant. There was    3-vessel coronary artery disease (LAD, RCA, and circumflex). Left main:    There was a tubular 70 % stenosis in the distal third of the vessel    segment. Proximal LAD: The vessel was small to medium sized. There was a    diffuse 80 % stenosis. Mid LAD: There was a 100 % stenosis. This lesion is    a chronic total occlusion. Distal LAD: The artery was supplied by    collaterals from ramus. 1st diagonal: The vessel was medium sized, patent.    Circumflex: The vessel was small to medium sized. Proximal circumflex:    There was a tubular 90 % stenosis at the ostium of the vessel segment. Mid    circumflex: There was a diffuse 70 % in-stent restenosis at the site of a    prior stent. Distal circumflex: The vessel was small sized. Ramus    intermedius: The vessel was large sized. There was a discrete 80 %    stenosis at the ostium of the vessel segment. Proximal RCA: There was a    tubular 100 % stenosis. This lesion is a chronic total occlusion. Distal    RCA is supplied by collaterals from circumflex artery. Graft to the distal    LAD: The graft was a LIMA. Graft angiography showed severe degeneration    and atretic vessel. There was a 100 % stenosis in the middle third of the    graft. Graft to the 1st diagonal: The graft was a saphenous vein graft    from the aorta. There was a 100 % stenosis at the graft ostium. Graft to    the 1st obtuse marginal: The graft was a saphenous vein graft from the    aorta. There was a 100 % stenosis at the graft ostium. Graft to the distal    RCA: The graft was a saphenous vein graft from the aorta. There was a 100    % stenosis at the graft ostium.    IMPRESSIONS: There is significant triple vessel coronary artery disease.    Occluded SVG grafts to RCA, OM and diagonal. Occluded LIMA to LAD.        Home Medications:  atorvastatin 80 mg oral tablet: 1 tab(s) orally once a day (11 Aug 2019 15:55)  carvedilol 25 mg oral tablet: 1 tab(s) orally 2 times a day (11 Aug 2019 15:54)  furosemide 40 mg oral tablet: 1 tab(s) orally once a day (11 Aug 2019 15:53)  isosorbide mononitrate 120 mg oral tablet, extended release: 1 tab(s) orally once a day (in the morning) (11 Aug 2019 15:54)  lisinopril 40 mg oral tablet: 1 tab(s) orally once a day (11 Aug 2019 15:54)  Low Dose ASA 81 mg oral tablet: 1 tab(s) orally once a day (11 Aug 2019 15:54)    MEDICATIONS  (STANDING):  ALBUTerol/ipratropium (CFC free) Inhaler. 1 Puff(s) Inhalation every 4 hours  aspirin  chewable 81 milliGRAM(s) Enteral Tube daily  atorvastatin 80 milliGRAM(s) Oral at bedtime  chlorhexidine 4% Liquid 1 Application(s) Topical daily  clopidogrel Tablet 75 milliGRAM(s) Oral daily  docusate sodium 100 milliGRAM(s) Oral daily  furosemide    Tablet 40 milliGRAM(s) Oral two times a day  heparin  Injectable 5000 Unit(s) SubCutaneous every 8 hours  isosorbide   dinitrate Tablet (ISORDIL) 10 milliGRAM(s) Oral two times a day  metoprolol tartrate 12.5 milliGRAM(s) Oral two times a day  pantoprazole   Suspension 40 milliGRAM(s) Enteral Tube daily  sacubitril 24 mG/valsartan 26 mG 1 Tablet(s) Oral two times a day  senna 1 Tablet(s) Oral at bedtime  sodium chloride 0.9%. 1000 milliLiter(s) (50 mL/Hr) IV Continuous <Continuous>    MEDICATIONS  (PRN):  acetaminophen   Tablet .. 650 milliGRAM(s) Oral every 6 hours PRN Temp greater or equal to 38C (100.4F)  ALBUTerol/ipratropium (CFC free) Inhaler. 1 Puff(s) Inhalation every 4 hours PRN Wheezing

## 2019-08-22 NOTE — PROGRESS NOTE ADULT - ASSESSMENT
#Acute respiratory failure due to decompensated systolic heart failure and COPD, PNA, suspected GNR.  - respiratory failure resolved, s/p intubation on admission, currently on RA  - ABx and steroids course completed   - cont lasix PO  - cont albuterol/ipratropium q4h  - Monitor Is & Os    # ACS, severe 3 vessels disease, all 4 grafts occluded. Decompensated chronic systolic CHF.  - EF 20-25%, pBNP 640  - c/w ASA and statin, plavix  - resume BB and Entrestro low dose, monitor BP  - will consider to add Aldactone   - CTS eval pending, pt agreed to be seen by CTS  - EP eval appreciated, pt needs lifevset  - BMP AM  - smoking cessation encouraged and counseled      DVT ppx     #Progress Note Handoff  Pending  Consults: CTS,   Tests: AM BMP  Family Discussion: not needed  Future Disposition: home

## 2019-08-22 NOTE — PROGRESS NOTE ADULT - SUBJECTIVE AND OBJECTIVE BOX
LISA DUDLEY    Patient is a 59y old  Male who presents with a chief complaint of Chest pain (22 Aug 2019 10:51)    HPI:  Pt was intubated on admission, hx was taken from Mr. Carl Phipps (brother) over the phone.     A 56? yo M (named Mr. Lisa Dudley) w/ PMH of COPD (smoke 4 packs/day for over 30 years), CAD s/p CABG, DM and hx of illicit drug use presents to the ED unresponsive. 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 Shortly after, pt asked for ambulance and was intubated on site. Pt was endorsing lethargy through out the day. As per brother, pt denies fever, chill, sick contacts, diarrhea, constipation. Denies alcohol use.     In the ED, pt was started on low dose of versed. Given azithromycin and ceftriaxone, multiple doses of duoneb and solumedrol 125 mg x 1. CXR appears to show mild pulmonary edema. CTH NC non remarkable and CT neck and chest show no acute pathology. Pt was admitted to ICU for acute respiratory failure. He was treated with Steroids, ABx and aggressive diuresis with significant improveemnt, extubated and transferred to telemetry unit.     INTERVAL HPI/OVERNIGHT EVENTS: no events overnight. Pt denies chest pain o SOB.     ROS: All ROS negative     PHYSICAL EXAM:  T(C): 35.6, Max: 37 (08-21-19 @ 22:00)  HR: 99 (84 - 101)  BP: 113/58 (92/62 - 151/80)  RR: 18 (18 - 18)  SpO2: 96% (96% - 96%)    GENERAL: NAD  PULMONARY/CHEST: No rales, rhonchi, wheezing  CARDIOVASC: Regular rate and rhythm; No murmurs  GI/ABDOMEN: Soft, Nontender, Nondistended; Bowel sounds present  EXTREMITIES:  2+ Peripheral Pulses, No clubbing, cyanosis, or edema, no deformity. No calf tenderness b/l.  NERVOUS SYSTEM:  Alert & Oriented X3, no focal deficit     Consultant(s) Notes Reviewed by me.     LABS:                        16.3   12.56 )-----------( 215      ( 22 Aug 2019 06:57 )             48.7     08-22    139  |  97<L>  |  24<H>  ----------------------------<  162<H>  3.5   |  25  |  0.9    Ca    9.7      22 Aug 2019 06:57      CAPILLARY BLOOD GLUCOSE  POCT Blood Glucose.: 129 mg/dL (21 Aug 2019 21:28)      MEDICATIONS  (STANDING):  ALBUTerol/ipratropium (CFC free) Inhaler. 1 Puff(s) Inhalation every 4 hours  aspirin  chewable 81 milliGRAM(s) Enteral Tube daily  atorvastatin 80 milliGRAM(s) Oral at bedtime  chlorhexidine 4% Liquid 1 Application(s) Topical daily  clopidogrel Tablet 75 milliGRAM(s) Oral daily  docusate sodium 100 milliGRAM(s) Oral daily  furosemide    Tablet 40 milliGRAM(s) Oral two times a day  heparin  Injectable 5000 Unit(s) SubCutaneous every 8 hours  isosorbide   dinitrate Tablet (ISORDIL) 10 milliGRAM(s) Oral two times a day  metoprolol tartrate 12.5 milliGRAM(s) Oral two times a day  pantoprazole   Suspension 40 milliGRAM(s) Enteral Tube daily  sacubitril 24 mG/valsartan 26 mG 1 Tablet(s) Oral two times a day  senna 1 Tablet(s) Oral at bedtime  sodium chloride 0.9%. 1000 milliLiter(s) (50 mL/Hr) IV Continuous <Continuous>    MEDICATIONS  (PRN):  acetaminophen   Tablet .. 650 milliGRAM(s) Oral every 6 hours PRN Temp greater or equal to 38C (100.4F)  ALBUTerol/ipratropium (CFC free) Inhaler. 1 Puff(s) Inhalation every 4 hours PRN Wheezing

## 2019-08-22 NOTE — PROGRESS NOTE ADULT - SUBJECTIVE AND OBJECTIVE BOX
Patient is a 59y old  Male who presents with a chief complaint of Chest pain (22 Aug 2019 06:50)        SUBJ:  Patient seen and examined. S/p Cath. CT surgery evaluation and EP evaluation was offered. patient does not want to stay in the hospital for further work-up or to be evaluated for LifeVest. Refusing cardiac MRI as well.      MEDICATIONS  (STANDING):  ALBUTerol/ipratropium (CFC free) Inhaler. 1 Puff(s) Inhalation every 4 hours  aspirin  chewable 81 milliGRAM(s) Enteral Tube daily  atorvastatin 80 milliGRAM(s) Oral at bedtime  chlorhexidine 4% Liquid 1 Application(s) Topical daily  clopidogrel Tablet 75 milliGRAM(s) Oral daily  docusate sodium 100 milliGRAM(s) Oral daily  furosemide    Tablet 40 milliGRAM(s) Oral two times a day  heparin  Injectable 5000 Unit(s) SubCutaneous every 8 hours  isosorbide   dinitrate Tablet (ISORDIL) 10 milliGRAM(s) Oral two times a day  metoprolol tartrate 12.5 milliGRAM(s) Oral two times a day  pantoprazole   Suspension 40 milliGRAM(s) Enteral Tube daily  sacubitril 24 mG/valsartan 26 mG 1 Tablet(s) Oral two times a day  senna 1 Tablet(s) Oral at bedtime  sodium chloride 0.9%. 1000 milliLiter(s) (50 mL/Hr) IV Continuous <Continuous>    MEDICATIONS  (PRN):  acetaminophen   Tablet .. 650 milliGRAM(s) Oral every 6 hours PRN Temp greater or equal to 38C (100.4F)  ALBUTerol/ipratropium (CFC free) Inhaler. 1 Puff(s) Inhalation every 4 hours PRN Wheezing            Vital Signs Last 24 Hrs  T(C): 35.6 (22 Aug 2019 05:24), Max: 37 (21 Aug 2019 22:00)  T(F): 96.1 (22 Aug 2019 05:24), Max: 98.6 (21 Aug 2019 22:00)  HR: 99 (22 Aug 2019 08:33) (84 - 101)  BP: 113/58 (22 Aug 2019 08:33) (92/62 - 151/80)  BP(mean): --  RR: 18 (22 Aug 2019 05:24) (18 - 18)  SpO2: 96% (22 Aug 2019 08:33) (96% - 96%)      PHYSICAL EXAM:    GEN: AAO x 3, NAD  HEENT: NC/AT  Neck: No JVD  CV: Reg, S1-S2  Ext: No edema      I&O's Summary  	      TTE:  < from: Transthoracic Echocardiogram (08.14.19 @ 14:08) >  Summary:   1. Left ventricular ejection fraction, by visual estimation,is 20 to   25%.   2. Spectral Doppler shows pseudonormal pattern of left ventricular   myocardial filling (Grade II diastolic dysfunction).   3. Mitral annular calcification.   4. Sclerotic aortic valve with normal opening.   5. Mild dilatation of the aortic root.   6. Estimated pulmonary artery systolic pressure is 47.1 mmHg assuming a   right atrial pressure of 15 mmHg, which is consistent with mild pulmonary   hypertension.   7. LAD distribution, entire septum, and entire apex are abnormal as   described above.    < end of copied text >      LABS:                        16.3   12.56 )-----------( 215      ( 22 Aug 2019 06:57 )             48.7     08-22    139  |  97<L>  |  24<H>  ----------------------------<  162<H>  3.5   |  25  |  0.9    Ca    9.7      22 Aug 2019 06:57                BNP  RADIOLOGY & ADDITIONAL STUDIES:      IMPRESSION AND PLAN: no weight loss/no night sweats

## 2019-08-22 NOTE — PROGRESS NOTE ADULT - ASSESSMENT
- severe multivessel CAD, s/p CABG, occluded grafts.  - ischemic CM  - MR for viability was recommended  with possible surgical revascularization. Patient is not interested in further work-up and would like to leave AMA.  - C/w medical therapy for CAD, CHF. Patient started on Entresto, BB, DAPT, statin. C/w diuretic.   - Refusing EP evaluation or Life Vest as well.  Risks, benefits, options for work-up and therapy discussed with the patient. He still would prefer to sign out AMA and f/u in [place].  Smoking cessation recommended.

## 2019-08-22 NOTE — PROGRESS NOTE ADULT - ASSESSMENT
A 60 yo M w/ PMH of COPD (smoke 4 packs/day for over 30 years), CAD s/p CABG, DM and hx of illicit drug use presents to the ED unresponsive. 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 Shortly after, pt asked for ambulance and was intubated on site.   In the ED, trauma workup was negative for head trauma and PE was r/o via CTA, CRX showed mild pulm edema. He was started on low dose of versed, azithromycin and ceftriaxone, duoneb and solumedrol 125 mg x 1 and pt was admitted to ICU.    #RS failure  -Breathing RA, no distress  -CXR no cardiopulm disease, no consolidation   -LASIX 40 IV Q 12H, albuterol/ipratropium q4h  -Off steroids  -Monitor Is & Os    #CHF AND ACS  -Pending medical rec of ECHO from [facility]   -EF 20-25%, pBNP 640  -c/w ASA and statin, plavix, Metoprolol 12.5 Q12H  -Not on ACE  -S/p cath 8/21 with (Dr Eason): all grafts occluded, prior stents in RCA - occluded, LCX stents - severe restenosis.  -Pending cardiac MRI to assess prior to CABG  -CT and EP c/s for possible CABG, lifevest/aicd  -Working with PT    #pneumonia:  -Bl cx NTD, afebrile, no wbc  -Monitor Is & Os    #DM: monitor FS    Diet: Dysphagia 2  DVT ppx: UFH  GI prophylaxis. protonix A 58 yo M w/ PMH of COPD (smoke 4 packs/day for over 30 years), CAD s/p CABG, DM and hx of illicit drug use presents to the ED unresponsive. 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 Shortly after, pt asked for ambulance and was intubated on site.   In the ED, trauma workup was negative for head trauma and PE was r/o via CTA, CRX showed mild pulm edema. He was started on low dose of versed, azithromycin and ceftriaxone, duoneb and solumedrol 125 mg x 1 and pt was admitted to ICU.    #RS failure  -Breathing RA, no distress  -CXR no cardiopulm disease, no consolidation   -LASIX 40 IV Q 12H, albuterol/ipratropium q4h  -Off steroids  -Monitor Is & Os    #CHF AND ACS  -Pending medical rec of ECHO from [facility]   -EF 20-25%, pBNP 640  -c/w ASA and statin, plavix, Metoprolol 12.5 Q12H  -Not on ACE  -S/p cath 8/21 with (Dr Eason): all grafts occluded, prior stents in RCA - occluded, LCX stents - severe restenosis.  -Pending cardiac MRI to assess prior to CABG  -Pending CT c/s for possible CABG  -Seen by Dr. Chavez, agrees to lifevest for not and wants to follow up as an outpatient   -Working with PT    #pneumonia:  -Bl cx NTD, afebrile, no wbc  -Monitor Is & Os    #DM: monitor FS    Diet: Dysphagia 2  DVT ppx: UFH  GI prophylaxis. protonix

## 2019-08-22 NOTE — PROGRESS NOTE ADULT - SUBJECTIVE AND OBJECTIVE BOX
Hospital Day:  11d    Subjective:    Patient is a 59y old  Male who presents with a chief complaint of Chest pain (21 Aug 2019 18:12)      Past Medical Hx:   COPD (chronic obstructive pulmonary disease)  DM (diabetes mellitus)  CAD (coronary artery disease) of bypass graft    Past Sx:  S/P CABG (coronary artery bypass graft)    Allergies:  No Known Allergies    Current Meds:   Standng Meds:  ALBUTerol/ipratropium (CFC free) Inhaler. 1 Puff(s) Inhalation every 4 hours  aspirin  chewable 81 milliGRAM(s) Enteral Tube daily  atorvastatin 80 milliGRAM(s) Oral at bedtime  chlorhexidine 4% Liquid 1 Application(s) Topical daily  clopidogrel Tablet 75 milliGRAM(s) Oral daily  docusate sodium 100 milliGRAM(s) Oral daily  furosemide    Tablet 40 milliGRAM(s) Oral two times a day  heparin  Injectable 5000 Unit(s) SubCutaneous every 8 hours  isosorbide   dinitrate Tablet (ISORDIL) 10 milliGRAM(s) Oral two times a day  metoprolol tartrate 12.5 milliGRAM(s) Oral two times a day  pantoprazole   Suspension 40 milliGRAM(s) Enteral Tube daily  sacubitril 24 mG/valsartan 26 mG 1 Tablet(s) Oral two times a day  senna 1 Tablet(s) Oral at bedtime  sodium chloride 0.9%. 1000 milliLiter(s) (50 mL/Hr) IV Continuous <Continuous>    PRN Meds:  acetaminophen   Tablet .. 650 milliGRAM(s) Oral every 6 hours PRN Temp greater or equal to 38C (100.4F)  ALBUTerol/ipratropium (CFC free) Inhaler. 1 Puff(s) Inhalation every 4 hours PRN Wheezing    HOME MEDICATIONS:  atorvastatin 80 mg oral tablet: 1 tab(s) orally once a day  carvedilol 25 mg oral tablet: 1 tab(s) orally 2 times a day  furosemide 40 mg oral tablet: 1 tab(s) orally once a day  isosorbide mononitrate 120 mg oral tablet, extended release: 1 tab(s) orally once a day (in the morning)  lisinopril 40 mg oral tablet: 1 tab(s) orally once a day  Low Dose ASA 81 mg oral tablet: 1 tab(s) orally once a day      Vital Signs:   T(F): 96.1 (08-22-19 @ 05:24), Max: 98.6 (08-21-19 @ 22:00)  HR: 84 (08-22-19 @ 05:24) (84 - 101)  BP: 96/50 (08-22-19 @ 05:24) (92/62 - 151/80)  RR: 18 (08-22-19 @ 05:24) (18 - 18)  SpO2: --      08-20-19 @ 07:01  -  08-21-19 @ 07:00  --------------------------------------------------------  IN: 0 mL / OUT: 100 mL / NET: -100 mL        Physical Exam:   GENERAL: NAD  HEENT: NCAT  CHEST/LUNG: CTAB  HEART: Regular rate and rhythm; s1 s2 appreciated, No murmurs, rubs, or gallops  ABDOMEN: Soft, Nontender, Nondistended; Bowel sounds present  EXTREMITIES: No LE edema b/l  NERVOUS SYSTEM:  Alert & Oriented X3        Labs:         Serum Pro-Brain Natriuretic Peptide: 391 pg/mL (08-16-19 @ 04:37)  Serum Pro-Brain Natriuretic Peptide: 485 pg/mL (08-14-19 @ 04:30)  Serum Pro-Brain Natriuretic Peptide: 640 pg/mL (08-13-19 @ 04:55) Hospital Day:  11d    Subjective:    Patient is a 59y old  Male who presents with a chief complaint of Chest pain. No acute events overnight, in no distress this am.       Past Medical Hx:   COPD (chronic obstructive pulmonary disease)  DM (diabetes mellitus)  CAD (coronary artery disease) of bypass graft    Past Sx:  S/P CABG (coronary artery bypass graft)    Allergies:  No Known Allergies    Current Meds:   Standng Meds:  ALBUTerol/ipratropium (CFC free) Inhaler. 1 Puff(s) Inhalation every 4 hours  aspirin  chewable 81 milliGRAM(s) Enteral Tube daily  atorvastatin 80 milliGRAM(s) Oral at bedtime  chlorhexidine 4% Liquid 1 Application(s) Topical daily  clopidogrel Tablet 75 milliGRAM(s) Oral daily  docusate sodium 100 milliGRAM(s) Oral daily  furosemide    Tablet 40 milliGRAM(s) Oral two times a day  heparin  Injectable 5000 Unit(s) SubCutaneous every 8 hours  isosorbide   dinitrate Tablet (ISORDIL) 10 milliGRAM(s) Oral two times a day  metoprolol tartrate 12.5 milliGRAM(s) Oral two times a day  pantoprazole   Suspension 40 milliGRAM(s) Enteral Tube daily  sacubitril 24 mG/valsartan 26 mG 1 Tablet(s) Oral two times a day  senna 1 Tablet(s) Oral at bedtime  sodium chloride 0.9%. 1000 milliLiter(s) (50 mL/Hr) IV Continuous <Continuous>    PRN Meds:  acetaminophen   Tablet .. 650 milliGRAM(s) Oral every 6 hours PRN Temp greater or equal to 38C (100.4F)  ALBUTerol/ipratropium (CFC free) Inhaler. 1 Puff(s) Inhalation every 4 hours PRN Wheezing    HOME MEDICATIONS:  atorvastatin 80 mg oral tablet: 1 tab(s) orally once a day  carvedilol 25 mg oral tablet: 1 tab(s) orally 2 times a day  furosemide 40 mg oral tablet: 1 tab(s) orally once a day  isosorbide mononitrate 120 mg oral tablet, extended release: 1 tab(s) orally once a day (in the morning)  lisinopril 40 mg oral tablet: 1 tab(s) orally once a day  Low Dose ASA 81 mg oral tablet: 1 tab(s) orally once a day      Vital Signs:   T(F): 96.1 (08-22-19 @ 05:24), Max: 98.6 (08-21-19 @ 22:00)  HR: 84 (08-22-19 @ 05:24) (84 - 101)  BP: 96/50 (08-22-19 @ 05:24) (92/62 - 151/80)  RR: 18 (08-22-19 @ 05:24) (18 - 18)  SpO2: --      08-20-19 @ 07:01  -  08-21-19 @ 07:00  --------------------------------------------------------  IN: 0 mL / OUT: 100 mL / NET: -100 mL        Physical Exam:   GENERAL: NAD  HEENT: NCAT  CHEST/LUNG: CTAB  HEART: Regular rate and rhythm; s1 s2 appreciated, No murmurs, rubs, or gallops  ABDOMEN: Soft, Nontender, Nondistended; Bowel sounds present  EXTREMITIES: No LE edema b/l  NERVOUS SYSTEM:  Alert & Oriented X3        Labs:         Serum Pro-Brain Natriuretic Peptide: 391 pg/mL (08-16-19 @ 04:37)  Serum Pro-Brain Natriuretic Peptide: 485 pg/mL (08-14-19 @ 04:30)  Serum Pro-Brain Natriuretic Peptide: 640 pg/mL (08-13-19 @ 04:55)

## 2019-08-23 VITALS
HEART RATE: 90 BPM | TEMPERATURE: 99 F | RESPIRATION RATE: 18 BRPM | SYSTOLIC BLOOD PRESSURE: 100 MMHG | DIASTOLIC BLOOD PRESSURE: 61 MMHG

## 2019-08-23 LAB
ANION GAP SERPL CALC-SCNC: 14 MMOL/L — SIGNIFICANT CHANGE UP (ref 7–14)
BASOPHILS # BLD AUTO: 0.01 K/UL — SIGNIFICANT CHANGE UP (ref 0–0.2)
BASOPHILS NFR BLD AUTO: 0.1 % — SIGNIFICANT CHANGE UP (ref 0–1)
BUN SERPL-MCNC: 21 MG/DL — HIGH (ref 10–20)
CALCIUM SERPL-MCNC: 8.8 MG/DL — SIGNIFICANT CHANGE UP (ref 8.5–10.1)
CHLORIDE SERPL-SCNC: 98 MMOL/L — SIGNIFICANT CHANGE UP (ref 98–110)
CO2 SERPL-SCNC: 27 MMOL/L — SIGNIFICANT CHANGE UP (ref 17–32)
CREAT SERPL-MCNC: 0.7 MG/DL — SIGNIFICANT CHANGE UP (ref 0.7–1.5)
EOSINOPHIL # BLD AUTO: 0.17 K/UL — SIGNIFICANT CHANGE UP (ref 0–0.7)
EOSINOPHIL NFR BLD AUTO: 1.7 % — SIGNIFICANT CHANGE UP (ref 0–8)
GLUCOSE SERPL-MCNC: 136 MG/DL — HIGH (ref 70–99)
HCT VFR BLD CALC: 43.3 % — SIGNIFICANT CHANGE UP (ref 42–52)
HGB BLD-MCNC: 14.3 G/DL — SIGNIFICANT CHANGE UP (ref 14–18)
IMM GRANULOCYTES NFR BLD AUTO: 0.5 % — HIGH (ref 0.1–0.3)
LYMPHOCYTES # BLD AUTO: 1.24 K/UL — SIGNIFICANT CHANGE UP (ref 1.2–3.4)
LYMPHOCYTES # BLD AUTO: 12.4 % — LOW (ref 20.5–51.1)
MCHC RBC-ENTMCNC: 29.4 PG — SIGNIFICANT CHANGE UP (ref 27–31)
MCHC RBC-ENTMCNC: 33 G/DL — SIGNIFICANT CHANGE UP (ref 32–37)
MCV RBC AUTO: 88.9 FL — SIGNIFICANT CHANGE UP (ref 80–94)
MONOCYTES # BLD AUTO: 0.79 K/UL — HIGH (ref 0.1–0.6)
MONOCYTES NFR BLD AUTO: 7.9 % — SIGNIFICANT CHANGE UP (ref 1.7–9.3)
NEUTROPHILS # BLD AUTO: 7.72 K/UL — HIGH (ref 1.4–6.5)
NEUTROPHILS NFR BLD AUTO: 77.4 % — HIGH (ref 42.2–75.2)
NRBC # BLD: 0 /100 WBCS — SIGNIFICANT CHANGE UP (ref 0–0)
PLATELET # BLD AUTO: 183 K/UL — SIGNIFICANT CHANGE UP (ref 130–400)
POTASSIUM SERPL-MCNC: 3.2 MMOL/L — LOW (ref 3.5–5)
POTASSIUM SERPL-SCNC: 3.2 MMOL/L — LOW (ref 3.5–5)
RBC # BLD: 4.87 M/UL — SIGNIFICANT CHANGE UP (ref 4.7–6.1)
RBC # FLD: 13.4 % — SIGNIFICANT CHANGE UP (ref 11.5–14.5)
SODIUM SERPL-SCNC: 139 MMOL/L — SIGNIFICANT CHANGE UP (ref 135–146)
WBC # BLD: 9.98 K/UL — SIGNIFICANT CHANGE UP (ref 4.8–10.8)
WBC # FLD AUTO: 9.98 K/UL — SIGNIFICANT CHANGE UP (ref 4.8–10.8)

## 2019-08-23 PROCEDURE — 99233 SBSQ HOSP IP/OBS HIGH 50: CPT

## 2019-08-23 PROCEDURE — 93010 ELECTROCARDIOGRAM REPORT: CPT

## 2019-08-23 RX ORDER — SACUBITRIL AND VALSARTAN 24; 26 MG/1; MG/1
1 TABLET, FILM COATED ORAL
Qty: 30 | Refills: 0
Start: 2019-08-23

## 2019-08-23 RX ORDER — ISOSORBIDE DINITRATE 5 MG/1
1 TABLET ORAL
Qty: 60 | Refills: 0
Start: 2019-08-23 | End: 2019-09-21

## 2019-08-23 RX ORDER — FUROSEMIDE 40 MG
1 TABLET ORAL
Qty: 0 | Refills: 0 | DISCHARGE

## 2019-08-23 RX ORDER — SACUBITRIL AND VALSARTAN 24; 26 MG/1; MG/1
1 TABLET, FILM COATED ORAL
Qty: 60 | Refills: 0
Start: 2019-08-23

## 2019-08-23 RX ORDER — CLOPIDOGREL BISULFATE 75 MG/1
1 TABLET, FILM COATED ORAL
Qty: 30 | Refills: 0
Start: 2019-08-23

## 2019-08-23 RX ORDER — CARVEDILOL PHOSPHATE 80 MG/1
1 CAPSULE, EXTENDED RELEASE ORAL
Qty: 60 | Refills: 0
Start: 2019-08-23 | End: 2019-09-21

## 2019-08-23 RX ORDER — ISOSORBIDE MONONITRATE 60 MG/1
1 TABLET, EXTENDED RELEASE ORAL
Qty: 0 | Refills: 0 | DISCHARGE

## 2019-08-23 RX ORDER — FUROSEMIDE 40 MG
1 TABLET ORAL
Qty: 0 | Refills: 0 | DISCHARGE
Start: 2019-08-23

## 2019-08-23 RX ORDER — METOPROLOL TARTRATE 50 MG
0.5 TABLET ORAL
Qty: 30 | Refills: 0
Start: 2019-08-23

## 2019-08-23 RX ORDER — CARVEDILOL PHOSPHATE 80 MG/1
1 CAPSULE, EXTENDED RELEASE ORAL
Qty: 0 | Refills: 0 | DISCHARGE

## 2019-08-23 RX ADMIN — HEPARIN SODIUM 5000 UNIT(S): 5000 INJECTION INTRAVENOUS; SUBCUTANEOUS at 12:25

## 2019-08-23 RX ADMIN — HEPARIN SODIUM 5000 UNIT(S): 5000 INJECTION INTRAVENOUS; SUBCUTANEOUS at 05:51

## 2019-08-23 RX ADMIN — Medication 12.5 MILLIGRAM(S): at 05:51

## 2019-08-23 RX ADMIN — ISOSORBIDE DINITRATE 10 MILLIGRAM(S): 5 TABLET ORAL at 05:51

## 2019-08-23 RX ADMIN — Medication 100 MILLIGRAM(S): at 12:22

## 2019-08-23 RX ADMIN — PANTOPRAZOLE SODIUM 40 MILLIGRAM(S): 20 TABLET, DELAYED RELEASE ORAL at 12:22

## 2019-08-23 RX ADMIN — CLOPIDOGREL BISULFATE 75 MILLIGRAM(S): 75 TABLET, FILM COATED ORAL at 12:22

## 2019-08-23 RX ADMIN — Medication 1 PUFF(S): at 08:26

## 2019-08-23 RX ADMIN — Medication 81 MILLIGRAM(S): at 12:22

## 2019-08-23 RX ADMIN — Medication 40 MILLIGRAM(S): at 05:51

## 2019-08-23 RX ADMIN — SACUBITRIL AND VALSARTAN 1 TABLET(S): 24; 26 TABLET, FILM COATED ORAL at 05:51

## 2019-08-23 NOTE — PROGRESS NOTE ADULT - ATTENDING COMMENTS
Agree with resident's note, HPI, PE, assessment and plan.  Pt was seen and examined independently.     Awaiting viability study and subsequent CTS eval.   Continue current medications.

## 2019-08-23 NOTE — PROGRESS NOTE ADULT - SUBJECTIVE AND OBJECTIVE BOX
SUBJECTIVE:    Patient is a 59y old Male who presents with a chief complaint of Chest pain     Today is hospital day 12d. This morning he is resting comfortably in bed and reports that he wasn't able to sleep last night because of chest pain and shortness of breath. He says he wants everything to be done today and wants to get out of the hospital as soon as possible.    PAST MEDICAL & SURGICAL HISTORY  COPD (chronic obstructive pulmonary disease)  DM (diabetes mellitus)  CAD (coronary artery disease) of bypass graft  S/P CABG (coronary artery bypass graft)      ALLERGIES:  No Known Allergies    MEDICATIONS:  STANDING MEDICATIONS  ALBUTerol/ipratropium (CFC free) Inhaler. 1 Puff(s) Inhalation every 4 hours  aspirin  chewable 81 milliGRAM(s) Enteral Tube daily  atorvastatin 80 milliGRAM(s) Oral at bedtime  chlorhexidine 4% Liquid 1 Application(s) Topical daily  clopidogrel Tablet 75 milliGRAM(s) Oral daily  docusate sodium 100 milliGRAM(s) Oral daily  furosemide    Tablet 40 milliGRAM(s) Oral two times a day  heparin  Injectable 5000 Unit(s) SubCutaneous every 8 hours  isosorbide   dinitrate Tablet (ISORDIL) 10 milliGRAM(s) Oral two times a day  metoprolol tartrate 12.5 milliGRAM(s) Oral two times a day  pantoprazole   Suspension 40 milliGRAM(s) Enteral Tube daily  sacubitril 24 mG/valsartan 26 mG 1 Tablet(s) Oral two times a day  senna 1 Tablet(s) Oral at bedtime  sodium chloride 0.9%. 1000 milliLiter(s) IV Continuous <Continuous>    PRN MEDICATIONS  acetaminophen   Tablet .. 650 milliGRAM(s) Oral every 6 hours PRN  ALBUTerol/ipratropium (CFC free) Inhaler. 1 Puff(s) Inhalation every 4 hours PRN    VITALS:   T(F): 97.3  HR: 76  BP: 108/66  RR: 18  SpO2: 95%    LABS:                        16.3   12.56 )-----------( 215      ( 22 Aug 2019 06:57 )             48.7     08-22    139  |  97<L>  |  24<H>  ----------------------------<  162<H>  3.5   |  25  |  0.9    Ca    9.7      22 Aug 2019 06:57      PHYSICAL EXAM:  GEN: No acute distress  LUNGS: Clear to auscultation bilaterally   HEART: S1/S2 present. RRR.   ABD: Soft, non-tender, non-distended. Bowel sounds present  EXT: mild b/l LE edema  NEURO: AAOX3

## 2019-08-23 NOTE — DISCHARGE NOTE NURSING/CASE MANAGEMENT/SOCIAL WORK - NSDCPEWEB_GEN_ALL_CORE
[facility] for Tobacco Control website --- [email or web address] --- [email or web address]

## 2019-08-23 NOTE — DISCHARGE NOTE PROVIDER - CARE PROVIDER_API CALL
Sterling Eason (MD)  Cardiovascular Disease; Internal Medicine; Interventional Cardiology  [address] [place] [address]  Phone: [phone]  Fax: [phone]  Follow Up Time: 1 week

## 2019-08-23 NOTE — DISCHARGE NOTE PROVIDER - HOSPITAL COURSE
A 57 yo M w/ PMH of COPD (smoke 4 packs/day for over 30 years), CAD s/p CABG, DM and hx of illicit drug use presents to the ED unresponsive. 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 Shortly after, pt asked for ambulance and was intubated on site. Pt was endorsing lethargy through out the day. As per brother, pt denies fever, chill, sick contacts, diarrhea, constipation. Denies alcohol use.         In the ED, pt was started on low dose of versed. Given azithromycin and ceftriaxone, multiple doses of duoneb and solumedrol 125 mg x 1. CXR appears to show mild pulmonary edema. CTH NC non remarkable and CT neck and chest show no acute pathology. ICU consulted for intubated patient.         Heart failure was suspected.  CXR showed no cardiopulmonary disease, no consolidation. LASIX 40 IV Q 12H, albuterol/ipratropium q4h        CHF AND ACS. EF 20-25% (08/14), pBNP 640. c/w ASA and statin, plavix, Metoprolol 12.5 Q12H. S/p cath 8/21 with (Dr Eason): all grafts occluded, prior stents in RCA - occluded, LCX stents - severe restenosis. Pending cardiac MRI to assess prior to CABG. Patient was supposed to undergo cardiac MRI but is leaving AMA. Patient got his life vest today and now leaving AMA.

## 2019-08-23 NOTE — DISCHARGE NOTE NURSING/CASE MANAGEMENT/SOCIAL WORK - NSDCPEEMAIL_GEN_ALL_CORE
[facility] for Tobacco Control email [email or web address]

## 2019-08-23 NOTE — PROGRESS NOTE ADULT - REASON FOR ADMISSION
Chest pain

## 2019-08-23 NOTE — DISCHARGE NOTE PROVIDER - NSDCCPCAREPLAN_GEN_ALL_CORE_FT
PRINCIPAL DISCHARGE DIAGNOSIS  Diagnosis: Congestive heart failure  Assessment and Plan of Treatment: You had heart failure with very low ejection fraction. You are leaving against medical advice. Please follow up with your cardiologist as directed.

## 2019-08-23 NOTE — PROGRESS NOTE ADULT - PROVIDER SPECIALTY LIST ADULT
CCU
Cardiology
Critical Care
Hospitalist
Hospitalist
Internal Medicine
Cardiology
Cardiology
Internal Medicine
Cardiology

## 2019-08-23 NOTE — DISCHARGE NOTE NURSING/CASE MANAGEMENT/SOCIAL WORK - NSDCDPATPORTLINK_GEN_ALL_CORE
You can access the [facility] Patient Portal, offered by [facility], by registering with the following website: [email or web address]

## 2019-08-23 NOTE — PROGRESS NOTE ADULT - ASSESSMENT
A 58 yo M w/ PMH of COPD (smoke 4 packs/day for over 30 years), CAD s/p CABG, DM and hx of illicit drug use presents to the ED unresponsive. 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 Shortly after, pt asked for ambulance and was intubated on site.   In the ED, trauma workup was negative for head trauma and PE was r/o via CTA, CRX showed mild pulm edema. He was started on low dose of versed, azithromycin and ceftriaxone, duoneb and solumedrol 125 mg x 1 and pt was admitted to ICU.    # Heart failure  - Patient short of breath and had to use CPAP overnight for 3 to 4 hours  - Last night complained of chest pain. EKG done showing A .fib. Pain was atypical.  - CXR no cardiopulmonary disease, no consolidation   - LASIX 40 IV Q 12H, albuterol/ipratropium q4h  - Off steroids  - Monitor Is & Os    # CHF AND ACS  - EF 20-25% (08/14), pBNP 640   - c/w ASA and statin, plavix, Metoprolol 12.5 Q12H  - Not on ACE  - S/p cath 8/21 with (Dr Eason): all grafts occluded, prior stents in RCA - occluded, LCX stents - severe restenosis.  - Pending cardiac MRI to assess prior to CABG  - Pending CT c/s for possible CABG  - Seen by Dr. Chavez, agrees to lifevest for not and wants to follow up as an outpatient   -Working with PT    #pneumonia:  -Bl cx NTD, afebrile, no wbc  -Monitor Is & Os    #DM: monitor FS    Diet: Dysphagia 2  DVT ppx: UFH  GI prophylaxis. protonix A 58 yo M w/ PMH of COPD (smoke 4 packs/day for over 30 years), CAD s/p CABG, DM and hx of illicit drug use presents to the ED unresponsive. 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 where he started developing severe chest pain and difficulty breathing. Shortly after, pt asked for ambulance and was intubated on site.   In the ED, trauma workup was negative for head trauma and PE was r/o via CTA, CRX showed mild pulm edema. He was started on low dose of versed, azithromycin and ceftriaxone, duoneb and solumedrol 125 mg x 1 and pt was admitted to ICU.    # Heart failure  - Patient short of breath and had to use CPAP overnight for 3 to 4 hours  - Last night complained of chest pain. EKG done. Pain was atypical.  - CXR no cardiopulmonary disease, no consolidation   - LASIX 40 IV Q 12H, albuterol/ipratropium q4h  - Off steroids  - Monitor Is & Os    # CHF AND ACS  - EF 20-25% (08/14), pBNP 640   - c/w ASA and statin, plavix, Metoprolol 12.5 Q12H  - Not on ACE  - S/p cath 8/21 with (Dr Eason): all grafts occluded, prior stents in RCA - occluded, LCX stents - severe restenosis.  - Pending cardiac MRI to assess prior to CABG. Patient going for MRI today  - Pending CT c/s for possible CABG  - Seen by Dr. Chaevz, agrees to lifevest. Patient will get life vest today as per EP.    # Pneumonia  -Bl cx NTD, afebrile, no wbc  -Monitor Is & Os    #DM monitor FS    Diet: Dysphagia 2  DVT ppx: UFH  GI prophylaxis. protonix

## 2019-08-23 NOTE — CHART NOTE - NSCHARTNOTEFT_GEN_A_CORE
Patient refused cardiac MRI yesterday. There is no tech available today or the weekend. He wants to go home AMA and follow up with the cardiologist at [facility].  He received lifevest. Entresto requires prior authorization. I will discharge him with ASA, Plavix, Coreg ,Lisinopril and Isordil. Plan discussed with the attending Dr. Noriega.

## 2019-08-27 DIAGNOSIS — J96.00 ACUTE RESPIRATORY FAILURE, UNSPECIFIED WHETHER WITH HYPOXIA OR HYPERCAPNIA: ICD-10-CM

## 2019-08-27 DIAGNOSIS — I95.9 HYPOTENSION, UNSPECIFIED: ICD-10-CM

## 2019-08-27 DIAGNOSIS — J81.1 CHRONIC PULMONARY EDEMA: ICD-10-CM

## 2019-08-27 DIAGNOSIS — T82.855A STENOSIS OF CORONARY ARTERY STENT, INITIAL ENCOUNTER: ICD-10-CM

## 2019-08-27 DIAGNOSIS — F17.210 NICOTINE DEPENDENCE, CIGARETTES, UNCOMPLICATED: ICD-10-CM

## 2019-08-27 DIAGNOSIS — I25.10 ATHEROSCLEROTIC HEART DISEASE OF NATIVE CORONARY ARTERY WITHOUT ANGINA PECTORIS: ICD-10-CM

## 2019-08-27 DIAGNOSIS — E78.5 HYPERLIPIDEMIA, UNSPECIFIED: ICD-10-CM

## 2019-08-27 DIAGNOSIS — I50.23 ACUTE ON CHRONIC SYSTOLIC (CONGESTIVE) HEART FAILURE: ICD-10-CM

## 2019-08-27 DIAGNOSIS — J96.02 ACUTE RESPIRATORY FAILURE WITH HYPERCAPNIA: ICD-10-CM

## 2019-08-27 DIAGNOSIS — I21.4 NON-ST ELEVATION (NSTEMI) MYOCARDIAL INFARCTION: ICD-10-CM

## 2019-08-27 DIAGNOSIS — J96.01 ACUTE RESPIRATORY FAILURE WITH HYPOXIA: ICD-10-CM

## 2019-08-27 DIAGNOSIS — E11.9 TYPE 2 DIABETES MELLITUS WITHOUT COMPLICATIONS: ICD-10-CM

## 2019-08-27 DIAGNOSIS — J69.0 PNEUMONITIS DUE TO INHALATION OF FOOD AND VOMIT: ICD-10-CM
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
## 2019-08-27 DIAGNOSIS — J44.9 CHRONIC OBSTRUCTIVE PULMONARY DISEASE, UNSPECIFIED: ICD-10-CM

## 2019-08-27 DIAGNOSIS — Z95.1 PRESENCE OF AORTOCORONARY BYPASS GRAFT: ICD-10-CM

## 2019-08-27 DIAGNOSIS — J18.9 PNEUMONIA, UNSPECIFIED ORGANISM: ICD-10-CM

## 2019-08-27 DIAGNOSIS — I25.810 ATHEROSCLEROSIS OF CORONARY ARTERY BYPASS GRAFT(S) WITHOUT ANGINA PECTORIS: ICD-10-CM

## 2019-08-27 DIAGNOSIS — I25.5 ISCHEMIC CARDIOMYOPATHY: ICD-10-CM

## 2019-08-27 DIAGNOSIS — I25.82 CHRONIC TOTAL OCCLUSION OF CORONARY ARTERY: ICD-10-CM

## 2019-11-15 NOTE — PROGRESS NOTE ADULT - SUBJECTIVE AND OBJECTIVE BOX
Patient is a 59y old  Male who presents with a chief complaint of Chest pain (12 Aug 2019 08:40)      OVERNIGHT EVENTS:  intubated, on midazolam and propofol     SUBJECTIVE / INTERVAL HPI: Patient seen and examined at bedside.     VITAL SIGNS:  Vital Signs Last 24 Hrs  T(C): 37.5 (12 Aug 2019 16:00), Max: 37.5 (12 Aug 2019 12:00)  T(F): 99.5 (12 Aug 2019 16:00), Max: 99.5 (12 Aug 2019 12:00)  HR: 48 (12 Aug 2019 16:00) (48 - 76)  BP: 99/54 (12 Aug 2019 16:00) (78/45 - 113/60)  BP(mean): 77 (12 Aug 2019 16:00) (57 - 83)  RR: 21 (12 Aug 2019 16:00) (21 - 22)  SpO2: 100% (12 Aug 2019 16:00) (99% - 100%)    PHYSICAL EXAM:    General: WDWN  HEENT: NC/AT; PERRL, clear conjunctiva  Neck: supple  Cardiovascular: +S1/S2; RRR  Respiratory: CTA b/l; no W/R/R  Gastrointestinal: soft, NT/ND; +BSx4  Extremities: WWP; 2+ peripheral pulses; no edema   Neurological: AAOx3; no focal deficits    MEDICATIONS:  MEDICATIONS  (STANDING):  ALBUTerol/ipratropium (CFC free) Inhaler. 1 Puff(s) Inhalation every 4 hours  ampicillin/sulbactam  IVPB 1.5 Gram(s) IV Intermittent every 6 hours  aspirin  chewable 81 milliGRAM(s) Enteral Tube daily  atorvastatin 80 milliGRAM(s) Oral at bedtime  chlorhexidine 0.12% Liquid 15 milliLiter(s) Oral Mucosa two times a day  chlorhexidine 4% Liquid 1 Application(s) Topical daily  docusate sodium 100 milliGRAM(s) Oral daily  fentaNYL   Infusion. 0.5 MICROgram(s)/kG/Hr (4.67 mL/Hr) IV Continuous <Continuous>  furosemide   Injectable 40 milliGRAM(s) IV Push two times a day  heparin  Injectable 5000 Unit(s) SubCutaneous every 8 hours  methylPREDNISolone sodium succinate IVPB 60 milliGRAM(s) IV Intermittent daily  norepinephrine Infusion 0.05 MICROgram(s)/kG/Min (8.756 mL/Hr) IV Continuous <Continuous>  pantoprazole   Suspension 40 milliGRAM(s) Enteral Tube daily  propofol Infusion 10 MICROgram(s)/kG/Min (6.3 mL/Hr) IV Continuous <Continuous>  senna 1 Tablet(s) Oral at bedtime    MEDICATIONS  (PRN):  ALBUTerol/ipratropium (CFC free) Inhaler. 1 Puff(s) Inhalation every 4 hours PRN Wheezing      ALLERGIES:  Allergies    No Known Allergies    Intolerances        LABS:                        13.6   11.08 )-----------( 149      ( 12 Aug 2019 05:20 )             42.5     08-12    142  |  102  |  29<H>  ----------------------------<  146<H>  4.4   |  25  |  1.0    Ca    8.9      12 Aug 2019 05:20  Phos  4.3     08-12  Mg     2.1     08-12    TPro  6.3  /  Alb  3.7  /  TBili  0.4  /  DBili  x   /  AST  70<H>  /  ALT  28  /  AlkPhos  62  08-12    PT/INR - ( 11 Aug 2019 01:59 )   PT: 13.40 sec;   INR: 1.17 ratio    Troponin T, Serum: 0.16: Critical value:  Troponin T, Serum in AM (08.11.19 @ 04:29)    Troponin T, Serum: 0.13: Critical value: ng/mL    )         PTT - ( 11 Aug 2019 01:59 )  PTT:29.5 sec  Urinalysis Basic - ( 11 Aug 2019 02:06 )    Color: Yellow / Appearance: Clear / SG: >1.050 / pH: x  Gluc: x / Ketone: Negative  / Bili: Negative / Urobili: 12 mg/dL   Blood: x / Protein: 30 mg/dL / Nitrite: Negative   Leuk Esterase: Negative / RBC: 21 /HPF / WBC 4 /HPF   Sq Epi: x / Non Sq Epi: 4 /HPF / Bacteria: Negative      CAPILLARY BLOOD GLUCOSE      POCT Blood Glucose.: 205 mg/dL (12 Aug 2019 14:19)      RADIOLOGY & ADDITIONAL TESTS: Reviewed.  < from: Xray Chest 1 View- PORTABLE-Routine (08.12.19 @ 05:29) >  Impression:      Mildly increased pulmonary vascular congestion. Mildly increased right   basilar opacity. Trace of pleural effusion. No pneumothorax.     < end of copied text >  < from: CT Angio Chest w/ IV Cont (08.11.19 @ 00:00) >    IMPRESSION:     No CTA evidence of acute pulmonary embolus.    Reflux of intravenous contrast into the hepatic veins. This can be seen   with right heart dysfunction.    Atelectasis of the right lower lobe. Debris within right mainstem   bronchus.    < end of copied text >  < from: Transthoracic Echocardiogram (08.11.19 @ 07:00) >  Summary:   1. Left ventricular ejection fraction, by visual estimation, is 20 to   25%.   2. Spectral Doppler shows pseudonormal pattern of left ventricular   myocardial filling (Grade II diastolic dysfunction).   3. Mitral annular calcification.   4. Sclerotic aortic valve with normal opening.   5. Estimated pulmonary artery systolic pressure is 41.7 mmHg assuming a   right atrial pressureof 8 mmHg, which is consistent with mild pulmonary   hypertension    < end of copied text >      < from: Xray Kidney Ureter Bladder (08.12.19 @ 09:27) >  Impression:     Leads overlie thorax/upper abdomen obscuring anatomy. Nasogastric tube   has been placed into the stomach.    A nonobstructive unremarkable bowel gas pattern is present    < end of copied text >    ASSESSMENT:    PLAN: Patient is a 59y old  Male who presents with a chief complaint of Chest pain (12 Aug 2019 08:40)      OVERNIGHT EVENTS:  intubated, on midazolam and propofol, no pressors     SUBJECTIVE / INTERVAL HPI: Patient seen and examined at bedside.     VITAL SIGNS:  Vital Signs Last 24 Hrs  T(C): 37.5 (12 Aug 2019 16:00), Max: 37.5 (12 Aug 2019 12:00)  T(F): 99.5 (12 Aug 2019 16:00), Max: 99.5 (12 Aug 2019 12:00)  HR: 48 (12 Aug 2019 16:00) (48 - 76)  BP: 99/54 (12 Aug 2019 16:00) (78/45 - 113/60)  BP(mean): 77 (12 Aug 2019 16:00) (57 - 83)  RR: 21 (12 Aug 2019 16:00) (21 - 22)  SpO2: 100% (12 Aug 2019 16:00) (99% - 100%)    PHYSICAL EXAM:    General: WDWN  HEENT: NC/AT; PERRL, clear conjunctiva  Neck: supple  Cardiovascular: +S1/S2; RRR  Respiratory: CTA b/l; no W/R/R  Gastrointestinal: soft, NT/ND; +BSx4  Extremities: WWP; 2+ peripheral pulses; no edema   Neurological: AAOx3; no focal deficits    MEDICATIONS:  MEDICATIONS  (STANDING):  ALBUTerol/ipratropium (CFC free) Inhaler. 1 Puff(s) Inhalation every 4 hours  ampicillin/sulbactam  IVPB 1.5 Gram(s) IV Intermittent every 6 hours  aspirin  chewable 81 milliGRAM(s) Enteral Tube daily  atorvastatin 80 milliGRAM(s) Oral at bedtime  chlorhexidine 0.12% Liquid 15 milliLiter(s) Oral Mucosa two times a day  chlorhexidine 4% Liquid 1 Application(s) Topical daily  docusate sodium 100 milliGRAM(s) Oral daily  fentaNYL   Infusion. 0.5 MICROgram(s)/kG/Hr (4.67 mL/Hr) IV Continuous <Continuous>  furosemide   Injectable 40 milliGRAM(s) IV Push two times a day  heparin  Injectable 5000 Unit(s) SubCutaneous every 8 hours  methylPREDNISolone sodium succinate IVPB 60 milliGRAM(s) IV Intermittent daily  norepinephrine Infusion 0.05 MICROgram(s)/kG/Min (8.756 mL/Hr) IV Continuous <Continuous>  pantoprazole   Suspension 40 milliGRAM(s) Enteral Tube daily  propofol Infusion 10 MICROgram(s)/kG/Min (6.3 mL/Hr) IV Continuous <Continuous>  senna 1 Tablet(s) Oral at bedtime    MEDICATIONS  (PRN):  ALBUTerol/ipratropium (CFC free) Inhaler. 1 Puff(s) Inhalation every 4 hours PRN Wheezing      ALLERGIES:  Allergies    No Known Allergies    Intolerances        LABS:                        13.6   11.08 )-----------( 149      ( 12 Aug 2019 05:20 )             42.5     08-12    142  |  102  |  29<H>  ----------------------------<  146<H>  4.4   |  25  |  1.0    Ca    8.9      12 Aug 2019 05:20  Phos  4.3     08-12  Mg     2.1     08-12    TPro  6.3  /  Alb  3.7  /  TBili  0.4  /  DBili  x   /  AST  70<H>  /  ALT  28  /  AlkPhos  62  08-12    PT/INR - ( 11 Aug 2019 01:59 )   PT: 13.40 sec;   INR: 1.17 ratio    Troponin T, Serum: 0.16: Critical value:  Troponin T, Serum in AM (08.11.19 @ 04:29)    Troponin T, Serum: 0.13: Critical value: ng/mL    )         PTT - ( 11 Aug 2019 01:59 )  PTT:29.5 sec  Urinalysis Basic - ( 11 Aug 2019 02:06 )    Color: Yellow / Appearance: Clear / SG: >1.050 / pH: x  Gluc: x / Ketone: Negative  / Bili: Negative / Urobili: 12 mg/dL   Blood: x / Protein: 30 mg/dL / Nitrite: Negative   Leuk Esterase: Negative / RBC: 21 /HPF / WBC 4 /HPF   Sq Epi: x / Non Sq Epi: 4 /HPF / Bacteria: Negative      CAPILLARY BLOOD GLUCOSE      POCT Blood Glucose.: 205 mg/dL (12 Aug 2019 14:19)      RADIOLOGY & ADDITIONAL TESTS: Reviewed.  < from: Xray Chest 1 View- PORTABLE-Routine (08.12.19 @ 05:29) >  Impression:      Mildly increased pulmonary vascular congestion. Mildly increased right   basilar opacity. Trace of pleural effusion. No pneumothorax.     < end of copied text >  < from: CT Angio Chest w/ IV Cont (08.11.19 @ 00:00) >    IMPRESSION:     No CTA evidence of acute pulmonary embolus.    Reflux of intravenous contrast into the hepatic veins. This can be seen   with right heart dysfunction.    Atelectasis of the right lower lobe. Debris within right mainstem   bronchus.    < end of copied text >  < from: Transthoracic Echocardiogram (08.11.19 @ 07:00) >  Summary:   1. Left ventricular ejection fraction, by visual estimation, is 20 to   25%.   2. Spectral Doppler shows pseudonormal pattern of left ventricular   myocardial filling (Grade II diastolic dysfunction).   3. Mitral annular calcification.   4. Sclerotic aortic valve with normal opening.   5. Estimated pulmonary artery systolic pressure is 41.7 mmHg assuming a   right atrial pressureof 8 mmHg, which is consistent with mild pulmonary   hypertension    < end of copied text >      < from: Xray Kidney Ureter Bladder (08.12.19 @ 09:27) >  Impression:     Leads overlie thorax/upper abdomen obscuring anatomy. Nasogastric tube   has been placed into the stomach.    A nonobstructive unremarkable bowel gas pattern is present    < end of copied text >    ASSESSMENT:    PLAN: soft/nondistended

## 2021-05-12 NOTE — PROGRESS NOTE ADULT - ASSESSMENT
IMPRESSION:    Acute respiratory failure/ CHF/ 20 TO 25% s/p extubation  RLL PNA/ aspiration/ cx neg  COPD/ GOSIA      PLAN:    CNS: SAT.     HEENT: Oral care    PULMONARY:  HOB @ 45 degrees.  bipap at night    CARDIOVASCULAR: Cardiology f/up. Continue ASA, Plavix. po lasix hold fluid    GI: GI prophylaxis.  OG Feeding.     RENAL:  Follow up lytes.  Correct as needed. dc ivf    INFECTIOUS DISEASE: Follow up cultures    HEMATOLOGICAL:  DVT prophylaxis.    ENDOCRINE:  Follow up FS.    MUSCULOSKELETAL: Bedrest.    Downgrade to Telemetry today  OOB to chair  cardio f/up IMPRESSION:    Acute respiratory failure/ CHF/ 20 TO 25% s/p extubation/ NSTEMI  RLL PNA/ aspiration/ cx neg  COPD/ GOSIA      PLAN:    CNS: SAT.     HEENT: Oral care    PULMONARY:  HOB @ 45 degrees.  bipap at night    CARDIOVASCULAR: Cardiology f/up. Continue ASA, Plavix. po lasix     GI: GI prophylaxis.  OG Feeding.     RENAL:  Follow up lytes.  Correct as needed.     INFECTIOUS DISEASE: Follow up cultures, DC ABX    HEMATOLOGICAL:  DVT prophylaxis.    ENDOCRINE:  Follow up FS.    MUSCULOSKELETAL: Bedrest.    Downgrade to Telemetry today  OOB to chair  cardio f/up Graft Basting Suture (Optional): 5-0 Prolene

## 2022-12-04 NOTE — PROGRESS NOTE ADULT - ASSESSMENT
A 60 yo M (named Mr. Dimitry Frazier) w/ PMH of COPD (smoke 4 packs/day for over 30 years), CAD s/p CABG, DM and hx of illicit drug use presents to the ED unresponsive.      stays stable on Vent, off pressors, still requires high o2    #I spoke to cardiology office at the [facility] Dr. Darius Coe waiting for them to fax medical reports. ,       #RS failure: successfully extubated after round  -cardiology advice to hold on lasix as no signs of fluid overload with low levels of BNP, the pt barley has -ve fluid balance, with low EF of unknwon baseline, will decrease lasix to 60mg QD  -c/w unasyn for 7 days, last dose on 8/17 , f/u cultures  -off steroid   -albuterol/ipratropium q4h  -trend troponin  -Monitor Is & Os      #CHF AND ACS, EF 20-25%  -c/w ASA and statin  -restart BB and ACEI when BP permits, currently off pressors   -monitor troponin  -cardio Will consider cath once more stable.  -waiting reports from [facility]    #DVT ppx  -UFH    #DM: monitor FS    #GI prophylaxis. protonix,  OG Feeding Yes